# Patient Record
Sex: FEMALE | Race: WHITE | NOT HISPANIC OR LATINO | Employment: OTHER | URBAN - METROPOLITAN AREA
[De-identification: names, ages, dates, MRNs, and addresses within clinical notes are randomized per-mention and may not be internally consistent; named-entity substitution may affect disease eponyms.]

---

## 2019-03-08 ENCOUNTER — HOSPITAL ENCOUNTER (OUTPATIENT)
Dept: URGENT CARE | Facility: CLINIC | Age: 64
Discharge: HOME | End: 2019-03-08

## 2019-03-09 LAB — BACTERIA SPEC AEROBE CULT: NORMAL

## 2019-04-11 ENCOUNTER — HOSPITAL ENCOUNTER (OUTPATIENT)
Dept: URGENT CARE | Facility: CLINIC | Age: 64
Discharge: HOME | End: 2019-04-11
Attending: FAMILY MEDICINE

## 2019-04-11 LAB — GLUCOSE BLD-MCNC: 105 MG/DL (ref 65–99)

## 2019-05-02 ENCOUNTER — OFFICE VISIT CONVERTED (OUTPATIENT)
Dept: FAMILY MEDICINE CLINIC | Facility: CLINIC | Age: 64
End: 2019-05-02
Attending: NURSE PRACTITIONER

## 2019-05-09 ENCOUNTER — HOSPITAL ENCOUNTER (OUTPATIENT)
Dept: GENERAL RADIOLOGY | Facility: HOSPITAL | Age: 64
Discharge: HOME | End: 2019-05-09
Attending: NURSE PRACTITIONER

## 2019-05-09 LAB
CREAT BLD-MCNC: 0.7 MG/DL (ref 0.6–1.4)
GFR SERPLBLD BASED ON 1.73 SQ M-ARVRAT: >60 ML/MIN/{1.73_M2}

## 2019-07-29 ENCOUNTER — HOSPITAL ENCOUNTER (OUTPATIENT)
Dept: GENERAL RADIOLOGY | Facility: HOSPITAL | Age: 64
Discharge: HOME | End: 2019-07-29
Attending: NURSE PRACTITIONER

## 2019-08-02 ENCOUNTER — OFFICE VISIT CONVERTED (OUTPATIENT)
Dept: FAMILY MEDICINE CLINIC | Facility: CLINIC | Age: 64
End: 2019-08-02
Attending: NURSE PRACTITIONER

## 2019-08-02 ENCOUNTER — CONVERSION ENCOUNTER (OUTPATIENT)
Dept: FAMILY MEDICINE CLINIC | Facility: CLINIC | Age: 64
End: 2019-08-02

## 2019-11-08 ENCOUNTER — HOSPITAL ENCOUNTER (OUTPATIENT)
Dept: LAB | Facility: HOSPITAL | Age: 64
Discharge: HOME | End: 2019-11-08
Attending: NURSE PRACTITIONER

## 2019-11-08 LAB
25(OH)D3 SERPL-MCNC: 48.9 NG/ML (ref 30–100)
ALBUMIN SERPL-MCNC: 4.6 G/DL (ref 3.5–5)
ALBUMIN/GLOB SERPL: 1.7 {RATIO} (ref 1.4–2.6)
ALP SERPL-CCNC: 55 U/L (ref 43–160)
ALT SERPL-CCNC: 22 U/L (ref 10–40)
ANION GAP SERPL CALC-SCNC: 18 MMOL/L (ref 8–19)
AST SERPL-CCNC: 16 U/L (ref 15–50)
BASOPHILS # BLD AUTO: 0.05 10*3/UL (ref 0–0.2)
BASOPHILS NFR BLD AUTO: 0.6 % (ref 0–3)
BILIRUB SERPL-MCNC: 0.17 MG/DL (ref 0.2–1.3)
BUN SERPL-MCNC: 13 MG/DL (ref 5–25)
BUN/CREAT SERPL: 17 {RATIO} (ref 6–20)
CALCIUM SERPL-MCNC: 9.6 MG/DL (ref 8.7–10.4)
CHLORIDE SERPL-SCNC: 103 MMOL/L (ref 99–111)
CHOLEST SERPL-MCNC: 223 MG/DL (ref 107–200)
CHOLEST/HDLC SERPL: 3.6 {RATIO} (ref 3–6)
CONV ABS IMM GRAN: 0.02 10*3/UL (ref 0–0.2)
CONV CO2: 22 MMOL/L (ref 22–32)
CONV IMMATURE GRAN: 0.2 % (ref 0–1.8)
CONV TOTAL PROTEIN: 7.3 G/DL (ref 6.3–8.2)
CREAT UR-MCNC: 0.75 MG/DL (ref 0.5–0.9)
DEPRECATED RDW RBC AUTO: 45.3 FL (ref 36.4–46.3)
EOSINOPHIL # BLD AUTO: 0.39 10*3/UL (ref 0–0.7)
EOSINOPHIL # BLD AUTO: 4.8 % (ref 0–7)
ERYTHROCYTE [DISTWIDTH] IN BLOOD BY AUTOMATED COUNT: 13.2 % (ref 11.7–14.4)
EST. AVERAGE GLUCOSE BLD GHB EST-MCNC: 123 MG/DL
GFR SERPLBLD BASED ON 1.73 SQ M-ARVRAT: >60 ML/MIN/{1.73_M2}
GLOBULIN UR ELPH-MCNC: 2.7 G/DL (ref 2–3.5)
GLUCOSE SERPL-MCNC: 99 MG/DL (ref 65–99)
HBA1C MFR BLD: 5.9 % (ref 3.5–5.7)
HCT VFR BLD AUTO: 41.2 % (ref 37–47)
HDLC SERPL-MCNC: 62 MG/DL (ref 40–60)
HGB BLD-MCNC: 13.6 G/DL (ref 12–16)
LDLC SERPL CALC-MCNC: 135 MG/DL (ref 70–100)
LYMPHOCYTES # BLD AUTO: 3 10*3/UL (ref 1–5)
LYMPHOCYTES NFR BLD AUTO: 36.9 % (ref 20–45)
MCH RBC QN AUTO: 30.6 PG (ref 27–31)
MCHC RBC AUTO-ENTMCNC: 33 G/DL (ref 33–37)
MCV RBC AUTO: 92.6 FL (ref 81–99)
MONOCYTES # BLD AUTO: 0.6 10*3/UL (ref 0.2–1.2)
MONOCYTES NFR BLD AUTO: 7.4 % (ref 3–10)
NEUTROPHILS # BLD AUTO: 4.06 10*3/UL (ref 2–8)
NEUTROPHILS NFR BLD AUTO: 50.1 % (ref 30–85)
NRBC CBCN: 0 % (ref 0–0.7)
OSMOLALITY SERPL CALC.SUM OF ELEC: 288 MOSM/KG (ref 273–304)
PLATELET # BLD AUTO: 376 10*3/UL (ref 130–400)
PMV BLD AUTO: 10.6 FL (ref 9.4–12.3)
POTASSIUM SERPL-SCNC: 4.2 MMOL/L (ref 3.5–5.3)
RBC # BLD AUTO: 4.45 10*6/UL (ref 4.2–5.4)
SODIUM SERPL-SCNC: 139 MMOL/L (ref 135–147)
T4 FREE SERPL-MCNC: 1 NG/DL (ref 0.9–1.8)
TRIGL SERPL-MCNC: 129 MG/DL (ref 40–150)
TSH SERPL-ACNC: 2.19 M[IU]/L (ref 0.27–4.2)
VLDLC SERPL-MCNC: 26 MG/DL (ref 5–37)
WBC # BLD AUTO: 8.12 10*3/UL (ref 4.8–10.8)

## 2019-12-08 ENCOUNTER — HOSPITAL ENCOUNTER (OUTPATIENT)
Dept: URGENT CARE | Facility: CLINIC | Age: 64
Discharge: HOME | End: 2019-12-08
Attending: FAMILY MEDICINE

## 2020-02-10 ENCOUNTER — HOSPITAL ENCOUNTER (OUTPATIENT)
Dept: URGENT CARE | Facility: CLINIC | Age: 65
Discharge: HOME | End: 2020-02-10
Attending: FAMILY MEDICINE

## 2020-02-12 LAB — BACTERIA SPEC AEROBE CULT: NORMAL

## 2020-02-14 ENCOUNTER — HOSPITAL ENCOUNTER (OUTPATIENT)
Dept: ONCOLOGY | Facility: HOSPITAL | Age: 65
Discharge: HOME | End: 2020-02-14
Attending: INTERNAL MEDICINE

## 2020-02-14 ENCOUNTER — OFFICE VISIT CONVERTED (OUTPATIENT)
Dept: ONCOLOGY | Facility: HOSPITAL | Age: 65
End: 2020-02-14
Attending: INTERNAL MEDICINE

## 2020-02-20 ENCOUNTER — HOSPITAL ENCOUNTER (OUTPATIENT)
Dept: GENERAL RADIOLOGY | Facility: HOSPITAL | Age: 65
Discharge: HOME | End: 2020-02-20
Attending: INTERNAL MEDICINE

## 2020-02-20 LAB
CREAT BLD-MCNC: 0.6 MG/DL (ref 0.6–1.4)
GFR SERPLBLD BASED ON 1.73 SQ M-ARVRAT: >60 ML/MIN/{1.73_M2}

## 2020-03-05 ENCOUNTER — HOSPITAL ENCOUNTER (OUTPATIENT)
Dept: ONCOLOGY | Facility: HOSPITAL | Age: 65
Discharge: HOME | End: 2020-03-05
Attending: INTERNAL MEDICINE

## 2020-03-05 ENCOUNTER — OFFICE VISIT CONVERTED (OUTPATIENT)
Dept: ONCOLOGY | Facility: HOSPITAL | Age: 65
End: 2020-03-05
Attending: INTERNAL MEDICINE

## 2020-06-24 ENCOUNTER — HOSPITAL ENCOUNTER (OUTPATIENT)
Dept: FAMILY MEDICINE CLINIC | Facility: CLINIC | Age: 65
Discharge: HOME | End: 2020-06-24
Attending: NURSE PRACTITIONER

## 2020-06-24 LAB
APPEARANCE UR: CLEAR
BILIRUB UR QL: NEGATIVE
COLOR UR: YELLOW
CONV BACTERIA: NEGATIVE
CONV COLLECTION SOURCE (UA): ABNORMAL
CONV HYALINE CASTS IN URINE MICRO: ABNORMAL /[LPF]
CONV UROBILINOGEN IN URINE BY AUTOMATED TEST STRIP: 0.2 {EHRLICHU}/DL (ref 0.1–1)
GLUCOSE UR QL: NEGATIVE MG/DL
HGB UR QL STRIP: ABNORMAL
KETONES UR QL STRIP: NEGATIVE MG/DL
LEUKOCYTE ESTERASE UR QL STRIP: NEGATIVE
NITRITE UR QL STRIP: NEGATIVE
PH UR STRIP.AUTO: 7.5 [PH] (ref 5–8)
PROT UR QL: NEGATIVE MG/DL
RBC #/AREA URNS HPF: ABNORMAL /[HPF]
SP GR UR: 1.02 (ref 1–1.03)
SQUAMOUS SPT QL MICRO: ABNORMAL /[HPF]
WBC #/AREA URNS HPF: ABNORMAL /[HPF]

## 2020-06-25 ENCOUNTER — TELEMEDICINE CONVERTED (OUTPATIENT)
Dept: FAMILY MEDICINE CLINIC | Facility: CLINIC | Age: 65
End: 2020-06-25
Attending: NURSE PRACTITIONER

## 2020-06-26 ENCOUNTER — TELEMEDICINE CONVERTED (OUTPATIENT)
Dept: FAMILY MEDICINE CLINIC | Facility: CLINIC | Age: 65
End: 2020-06-26
Attending: NURSE PRACTITIONER

## 2020-06-26 LAB — BACTERIA UR CULT: NORMAL

## 2020-07-06 ENCOUNTER — CONVERSION ENCOUNTER (OUTPATIENT)
Dept: SURGERY | Facility: CLINIC | Age: 65
End: 2020-07-06

## 2020-07-06 ENCOUNTER — OFFICE VISIT CONVERTED (OUTPATIENT)
Dept: SURGERY | Facility: CLINIC | Age: 65
End: 2020-07-06
Attending: SURGERY

## 2020-07-06 ENCOUNTER — TELEMEDICINE CONVERTED (OUTPATIENT)
Dept: FAMILY MEDICINE CLINIC | Facility: CLINIC | Age: 65
End: 2020-07-06
Attending: NURSE PRACTITIONER

## 2020-07-15 ENCOUNTER — OFFICE VISIT CONVERTED (OUTPATIENT)
Dept: SURGERY | Facility: CLINIC | Age: 65
End: 2020-07-15
Attending: NURSE PRACTITIONER

## 2020-07-15 ENCOUNTER — CONVERSION ENCOUNTER (OUTPATIENT)
Dept: SURGERY | Facility: CLINIC | Age: 65
End: 2020-07-15

## 2020-07-15 ENCOUNTER — HOSPITAL ENCOUNTER (OUTPATIENT)
Dept: SURGERY | Facility: CLINIC | Age: 65
Discharge: HOME | End: 2020-07-15
Attending: NURSE PRACTITIONER

## 2020-07-15 LAB
APPEARANCE UR: ABNORMAL
BILIRUB UR QL: NEGATIVE
COLOR UR: ABNORMAL
CONV BACTERIA: ABNORMAL
CONV COLLECTION SOURCE (UA): ABNORMAL
CONV UROBILINOGEN IN URINE BY AUTOMATED TEST STRIP: 0.2 {EHRLICHU}/DL (ref 0.1–1)
GLUCOSE UR QL: NEGATIVE MG/DL
HGB UR QL STRIP: ABNORMAL
KETONES UR QL STRIP: NEGATIVE MG/DL
LEUKOCYTE ESTERASE UR QL STRIP: ABNORMAL
NITRITE UR QL STRIP: NEGATIVE
PH UR STRIP.AUTO: 5 [PH] (ref 5–8)
PROT UR QL: 100 MG/DL
RBC #/AREA URNS HPF: ABNORMAL /[HPF]
SP GR UR: 1.02 (ref 1–1.03)
SQUAMOUS SPT QL MICRO: ABNORMAL /[HPF]
WBC #/AREA URNS HPF: ABNORMAL /[HPF]

## 2020-07-17 LAB — BACTERIA UR CULT: NORMAL

## 2020-07-30 ENCOUNTER — CONVERSION ENCOUNTER (OUTPATIENT)
Dept: NEUROLOGY | Facility: CLINIC | Age: 65
End: 2020-07-30

## 2020-07-30 ENCOUNTER — OFFICE VISIT CONVERTED (OUTPATIENT)
Dept: NEUROLOGY | Facility: CLINIC | Age: 65
End: 2020-07-30
Attending: PSYCHIATRY & NEUROLOGY

## 2020-08-04 ENCOUNTER — TELEPHONE CONVERTED (OUTPATIENT)
Dept: UROLOGY | Facility: CLINIC | Age: 65
End: 2020-08-04
Attending: UROLOGY

## 2020-08-13 ENCOUNTER — HOSPITAL ENCOUNTER (OUTPATIENT)
Dept: PREADMISSION TESTING | Facility: HOSPITAL | Age: 65
Discharge: HOME | End: 2020-08-13
Attending: UROLOGY

## 2020-08-13 ENCOUNTER — OFFICE VISIT CONVERTED (OUTPATIENT)
Dept: NEUROLOGY | Facility: CLINIC | Age: 65
End: 2020-08-13
Attending: PSYCHIATRY & NEUROLOGY

## 2020-08-13 ENCOUNTER — CONVERSION ENCOUNTER (OUTPATIENT)
Dept: NEUROLOGY | Facility: CLINIC | Age: 65
End: 2020-08-13

## 2020-08-13 ENCOUNTER — HOSPITAL ENCOUNTER (OUTPATIENT)
Dept: LAB | Facility: HOSPITAL | Age: 65
Discharge: HOME | End: 2020-08-13
Attending: PSYCHIATRY & NEUROLOGY

## 2020-08-13 LAB — ERYTHROCYTE [SEDIMENTATION RATE] IN BLOOD: 6 MM/H (ref 0–30)

## 2020-08-14 LAB — SARS-COV-2 RNA SPEC QL NAA+PROBE: NOT DETECTED

## 2020-08-18 ENCOUNTER — HOSPITAL ENCOUNTER (OUTPATIENT)
Dept: PERIOP | Facility: HOSPITAL | Age: 65
Setting detail: HOSPITAL OUTPATIENT SURGERY
Discharge: HOME | End: 2020-08-18
Attending: UROLOGY

## 2020-08-26 ENCOUNTER — TELEPHONE CONVERTED (OUTPATIENT)
Dept: UROLOGY | Facility: CLINIC | Age: 65
End: 2020-08-26
Attending: UROLOGY

## 2020-08-28 ENCOUNTER — HOSPITAL ENCOUNTER (OUTPATIENT)
Dept: PREADMISSION TESTING | Facility: HOSPITAL | Age: 65
Discharge: HOME | End: 2020-08-28
Attending: UROLOGY

## 2020-08-28 LAB
ALBUMIN SERPL-MCNC: 4 G/DL (ref 3.5–5)
ALBUMIN/GLOB SERPL: 1.6 {RATIO} (ref 1.4–2.6)
ALP SERPL-CCNC: 47 U/L (ref 43–160)
ALT SERPL-CCNC: 16 U/L (ref 10–40)
ANION GAP SERPL CALC-SCNC: 14 MMOL/L (ref 8–19)
AST SERPL-CCNC: 11 U/L (ref 15–50)
BASOPHILS # BLD AUTO: 0.06 10*3/UL (ref 0–0.2)
BASOPHILS NFR BLD AUTO: 0.8 % (ref 0–3)
BILIRUB SERPL-MCNC: <0.15 MG/DL (ref 0.2–1.3)
BUN SERPL-MCNC: 16 MG/DL (ref 5–25)
BUN/CREAT SERPL: 21 {RATIO} (ref 6–20)
CALCIUM SERPL-MCNC: 10 MG/DL (ref 8.7–10.4)
CHLORIDE SERPL-SCNC: 106 MMOL/L (ref 99–111)
CONV ABS IMM GRAN: 0.01 10*3/UL (ref 0–0.2)
CONV CO2: 24 MMOL/L (ref 22–32)
CONV IMMATURE GRAN: 0.1 % (ref 0–1.8)
CONV TOTAL PROTEIN: 6.5 G/DL (ref 6.3–8.2)
CREAT UR-MCNC: 0.75 MG/DL (ref 0.5–0.9)
DEPRECATED RDW RBC AUTO: 44.2 FL (ref 36.4–46.3)
EOSINOPHIL # BLD AUTO: 0.31 10*3/UL (ref 0–0.7)
EOSINOPHIL # BLD AUTO: 3.9 % (ref 0–7)
ERYTHROCYTE [DISTWIDTH] IN BLOOD BY AUTOMATED COUNT: 12.9 % (ref 11.7–14.4)
GFR SERPLBLD BASED ON 1.73 SQ M-ARVRAT: >60 ML/MIN/{1.73_M2}
GLOBULIN UR ELPH-MCNC: 2.5 G/DL (ref 2–3.5)
GLUCOSE SERPL-MCNC: 100 MG/DL (ref 65–99)
HCT VFR BLD AUTO: 38.5 % (ref 37–47)
HGB BLD-MCNC: 12.4 G/DL (ref 12–16)
INR PPP: 0.93 (ref 2–3)
LYMPHOCYTES # BLD AUTO: 2.75 10*3/UL (ref 1–5)
LYMPHOCYTES NFR BLD AUTO: 34.7 % (ref 20–45)
MCH RBC QN AUTO: 30 PG (ref 27–31)
MCHC RBC AUTO-ENTMCNC: 32.2 G/DL (ref 33–37)
MCV RBC AUTO: 93 FL (ref 81–99)
MONOCYTES # BLD AUTO: 0.65 10*3/UL (ref 0.2–1.2)
MONOCYTES NFR BLD AUTO: 8.2 % (ref 3–10)
NEUTROPHILS # BLD AUTO: 4.15 10*3/UL (ref 2–8)
NEUTROPHILS NFR BLD AUTO: 52.3 % (ref 30–85)
NRBC CBCN: 0 % (ref 0–0.7)
OSMOLALITY SERPL CALC.SUM OF ELEC: 291 MOSM/KG (ref 273–304)
PLATELET # BLD AUTO: 354 10*3/UL (ref 130–400)
PMV BLD AUTO: 10.6 FL (ref 9.4–12.3)
POTASSIUM SERPL-SCNC: 3.6 MMOL/L (ref 3.5–5.3)
PROTHROMBIN TIME: 10.1 S (ref 9.4–12)
RBC # BLD AUTO: 4.14 10*6/UL (ref 4.2–5.4)
SODIUM SERPL-SCNC: 140 MMOL/L (ref 135–147)
WBC # BLD AUTO: 7.93 10*3/UL (ref 4.8–10.8)

## 2020-08-29 LAB — SARS-COV-2 RNA SPEC QL NAA+PROBE: NOT DETECTED

## 2020-09-09 ENCOUNTER — CONVERSION ENCOUNTER (OUTPATIENT)
Dept: SURGERY | Facility: CLINIC | Age: 65
End: 2020-09-09

## 2020-09-09 ENCOUNTER — HOSPITAL ENCOUNTER (OUTPATIENT)
Dept: GENERAL RADIOLOGY | Facility: HOSPITAL | Age: 65
Discharge: HOME | End: 2020-09-09
Attending: UROLOGY

## 2020-09-09 ENCOUNTER — OFFICE VISIT CONVERTED (OUTPATIENT)
Dept: UROLOGY | Facility: CLINIC | Age: 65
End: 2020-09-09
Attending: UROLOGY

## 2020-10-01 ENCOUNTER — TELEPHONE CONVERTED (OUTPATIENT)
Dept: UROLOGY | Facility: CLINIC | Age: 65
End: 2020-10-01
Attending: UROLOGY

## 2020-12-03 ENCOUNTER — PROCEDURE VISIT CONVERTED (OUTPATIENT)
Dept: UROLOGY | Facility: CLINIC | Age: 65
End: 2020-12-03
Attending: UROLOGY

## 2021-01-02 ENCOUNTER — HOSPITAL ENCOUNTER (OUTPATIENT)
Dept: URGENT CARE | Facility: CLINIC | Age: 66
Discharge: HOME | End: 2021-01-02
Attending: NURSE PRACTITIONER

## 2021-01-02 LAB — GLUCOSE BLD-MCNC: 109 MG/DL (ref 65–99)

## 2021-01-05 LAB
BACTERIA UR CULT: NORMAL
SARS-COV-2 RNA SPEC QL NAA+PROBE: DETECTED

## 2021-01-20 ENCOUNTER — OFFICE VISIT CONVERTED (OUTPATIENT)
Dept: FAMILY MEDICINE CLINIC | Facility: CLINIC | Age: 66
End: 2021-01-20
Attending: STUDENT IN AN ORGANIZED HEALTH CARE EDUCATION/TRAINING PROGRAM

## 2021-02-01 ENCOUNTER — OFFICE VISIT CONVERTED (OUTPATIENT)
Dept: SURGERY | Facility: CLINIC | Age: 66
End: 2021-02-01
Attending: NURSE PRACTITIONER

## 2021-02-01 ENCOUNTER — CONVERSION ENCOUNTER (OUTPATIENT)
Dept: SURGERY | Facility: CLINIC | Age: 66
End: 2021-02-01

## 2021-02-12 ENCOUNTER — HOSPITAL ENCOUNTER (OUTPATIENT)
Dept: LAB | Facility: HOSPITAL | Age: 66
Discharge: HOME | End: 2021-02-12
Attending: STUDENT IN AN ORGANIZED HEALTH CARE EDUCATION/TRAINING PROGRAM

## 2021-02-12 ENCOUNTER — HOSPITAL ENCOUNTER (OUTPATIENT)
Dept: GENERAL RADIOLOGY | Facility: HOSPITAL | Age: 66
Discharge: HOME | End: 2021-02-12
Attending: STUDENT IN AN ORGANIZED HEALTH CARE EDUCATION/TRAINING PROGRAM

## 2021-02-12 LAB
ALBUMIN SERPL-MCNC: 4.2 G/DL (ref 3.5–5)
ALBUMIN/GLOB SERPL: 1.5 {RATIO} (ref 1.4–2.6)
ALP SERPL-CCNC: 52 U/L (ref 43–160)
ALT SERPL-CCNC: 17 U/L (ref 10–40)
ANION GAP SERPL CALC-SCNC: 13 MMOL/L (ref 8–19)
AST SERPL-CCNC: 14 U/L (ref 15–50)
BASOPHILS # BLD AUTO: 0.03 10*3/UL (ref 0–0.2)
BASOPHILS NFR BLD AUTO: 0.4 % (ref 0–3)
BILIRUB SERPL-MCNC: 0.3 MG/DL (ref 0.2–1.3)
BUN SERPL-MCNC: 14 MG/DL (ref 5–25)
BUN/CREAT SERPL: 14 {RATIO} (ref 6–20)
CALCIUM SERPL-MCNC: 9.3 MG/DL (ref 8.7–10.4)
CHLORIDE SERPL-SCNC: 105 MMOL/L (ref 99–111)
CHOLEST SERPL-MCNC: 221 MG/DL (ref 107–200)
CHOLEST/HDLC SERPL: 3.6 {RATIO} (ref 3–6)
CONV ABS IMM GRAN: 0.01 10*3/UL (ref 0–0.2)
CONV CO2: 26 MMOL/L (ref 22–32)
CONV IMMATURE GRAN: 0.1 % (ref 0–1.8)
CONV TOTAL PROTEIN: 7 G/DL (ref 6.3–8.2)
CREAT UR-MCNC: 1.02 MG/DL (ref 0.5–0.9)
DEPRECATED RDW RBC AUTO: 45.7 FL (ref 36.4–46.3)
EOSINOPHIL # BLD AUTO: 0.3 10*3/UL (ref 0–0.7)
EOSINOPHIL # BLD AUTO: 3.9 % (ref 0–7)
ERYTHROCYTE [DISTWIDTH] IN BLOOD BY AUTOMATED COUNT: 13.5 % (ref 11.7–14.4)
EST. AVERAGE GLUCOSE BLD GHB EST-MCNC: 120 MG/DL
GFR SERPLBLD BASED ON 1.73 SQ M-ARVRAT: 58 ML/MIN/{1.73_M2}
GLOBULIN UR ELPH-MCNC: 2.8 G/DL (ref 2–3.5)
GLUCOSE SERPL-MCNC: 119 MG/DL (ref 65–99)
HBA1C MFR BLD: 5.8 % (ref 3.5–5.7)
HCT VFR BLD AUTO: 42.4 % (ref 37–47)
HDLC SERPL-MCNC: 62 MG/DL (ref 40–60)
HGB BLD-MCNC: 13.5 G/DL (ref 12–16)
LDLC SERPL CALC-MCNC: 138 MG/DL (ref 70–100)
LYMPHOCYTES # BLD AUTO: 2.88 10*3/UL (ref 1–5)
LYMPHOCYTES NFR BLD AUTO: 37.8 % (ref 20–45)
MCH RBC QN AUTO: 29 PG (ref 27–31)
MCHC RBC AUTO-ENTMCNC: 31.8 G/DL (ref 33–37)
MCV RBC AUTO: 91 FL (ref 81–99)
MONOCYTES # BLD AUTO: 0.64 10*3/UL (ref 0.2–1.2)
MONOCYTES NFR BLD AUTO: 8.4 % (ref 3–10)
NEUTROPHILS # BLD AUTO: 3.75 10*3/UL (ref 2–8)
NEUTROPHILS NFR BLD AUTO: 49.4 % (ref 30–85)
NRBC CBCN: 0 % (ref 0–0.7)
OSMOLALITY SERPL CALC.SUM OF ELEC: 292 MOSM/KG (ref 273–304)
PLATELET # BLD AUTO: 353 10*3/UL (ref 130–400)
PMV BLD AUTO: 10.4 FL (ref 9.4–12.3)
POTASSIUM SERPL-SCNC: 4.2 MMOL/L (ref 3.5–5.3)
RBC # BLD AUTO: 4.66 10*6/UL (ref 4.2–5.4)
SODIUM SERPL-SCNC: 140 MMOL/L (ref 135–147)
TRIGL SERPL-MCNC: 105 MG/DL (ref 40–150)
TSH SERPL-ACNC: 2.04 M[IU]/L (ref 0.27–4.2)
VLDLC SERPL-MCNC: 21 MG/DL (ref 5–37)
WBC # BLD AUTO: 7.61 10*3/UL (ref 4.8–10.8)

## 2021-03-22 ENCOUNTER — CONVERSION ENCOUNTER (OUTPATIENT)
Dept: SURGERY | Facility: CLINIC | Age: 66
End: 2021-03-22

## 2021-03-22 ENCOUNTER — OFFICE VISIT CONVERTED (OUTPATIENT)
Dept: UROLOGY | Facility: CLINIC | Age: 66
End: 2021-03-22
Attending: UROLOGY

## 2021-03-22 LAB
BILIRUB UR QL STRIP: NEGATIVE
COLOR UR: YELLOW
CONV BACTERIA IN URINE MICRO: 0
CONV CALCIUM OXALATE CRYSTALS /HPF IN URINE SEDIMENT BY MICROSCOPY: 0
CONV CLARITY OF URINE: CLEAR
CONV PROTEIN IN URINE BY AUTOMATED TEST STRIP: NEGATIVE
CONV UROBILINOGEN IN URINE BY AUTOMATED TEST STRIP: 0.2
GLUCOSE UR QL: NEGATIVE
HGB UR QL STRIP: NEGATIVE
KETONES UR QL STRIP: NEGATIVE
LEUKOCYTE ESTERASE UR QL STRIP: NEGATIVE
NITRITE UR QL STRIP: NEGATIVE
PH UR STRIP.AUTO: 7 [PH]
RBC #/AREA URNS HPF: 0 /[HPF]
RENAL EPI CELLS #/AREA URNS HPF: 0 /[HPF]
SP GR UR: 1.02
SQUAMOUS SPT QL MICRO: 0
WBC #/AREA URNS HPF: 0 /[HPF]

## 2021-03-26 ENCOUNTER — HOSPITAL ENCOUNTER (OUTPATIENT)
Dept: PREADMISSION TESTING | Facility: HOSPITAL | Age: 66
Discharge: HOME | End: 2021-03-26
Attending: SURGERY

## 2021-03-27 LAB — SARS-COV-2 RNA SPEC QL NAA+PROBE: NOT DETECTED

## 2021-03-31 ENCOUNTER — HOSPITAL ENCOUNTER (OUTPATIENT)
Dept: GASTROENTEROLOGY | Facility: HOSPITAL | Age: 66
Setting detail: HOSPITAL OUTPATIENT SURGERY
Discharge: HOME | End: 2021-03-31
Attending: SURGERY

## 2021-04-05 ENCOUNTER — HOSPITAL ENCOUNTER (OUTPATIENT)
Dept: PREADMISSION TESTING | Facility: HOSPITAL | Age: 66
Discharge: HOME | End: 2021-04-05
Attending: UROLOGY

## 2021-04-05 LAB — SARS-COV-2 RNA SPEC QL NAA+PROBE: NOT DETECTED

## 2021-04-06 ENCOUNTER — HOSPITAL ENCOUNTER (OUTPATIENT)
Dept: PERIOP | Facility: HOSPITAL | Age: 66
Setting detail: HOSPITAL OUTPATIENT SURGERY
Discharge: HOME | End: 2021-04-06
Attending: UROLOGY

## 2021-04-14 ENCOUNTER — OFFICE VISIT CONVERTED (OUTPATIENT)
Dept: SURGERY | Facility: CLINIC | Age: 66
End: 2021-04-14
Attending: NURSE PRACTITIONER

## 2021-04-14 ENCOUNTER — OFFICE VISIT CONVERTED (OUTPATIENT)
Dept: UROLOGY | Facility: CLINIC | Age: 66
End: 2021-04-14
Attending: UROLOGY

## 2021-04-14 LAB
BILIRUB UR QL STRIP: NEGATIVE
COLOR UR: YELLOW
CONV BACTERIA IN URINE MICRO: 0
CONV CALCIUM OXALATE CRYSTALS /HPF IN URINE SEDIMENT BY MICROSCOPY: 0
CONV CLARITY OF URINE: CLEAR
CONV PROTEIN IN URINE BY AUTOMATED TEST STRIP: NEGATIVE
CONV UROBILINOGEN IN URINE BY AUTOMATED TEST STRIP: 0.2
GLUCOSE UR QL: NEGATIVE
HGB UR QL STRIP: NEGATIVE
KETONES UR QL STRIP: NEGATIVE
LEUKOCYTE ESTERASE UR QL STRIP: NEGATIVE
NITRITE UR QL STRIP: NEGATIVE
PH UR STRIP.AUTO: 5 [PH]
RBC #/AREA URNS HPF: 0 /[HPF]
RENAL EPI CELLS #/AREA URNS HPF: 0 /[HPF]
SP GR UR: 1.02
SQUAMOUS SPT QL MICRO: 0
WBC #/AREA URNS HPF: 0 /[HPF]

## 2021-05-10 NOTE — H&P
History and Physical      Patient Name: Neda Núñez   Patient ID: [id number]   Sex: Female   YOB: 1955    Primary Care Provider: Yolanda COREA   Referring Provider: Yolanda COREA    Visit Date: July 30, 2020    Provider: Rustam Wheat MD   Location: [facility]   Location Address: [address] [place] [address]  [id number]   Location Phone: [phone]          Chief Complaint     New pt here for ocular h/a's.       History Of Present Illness  Neda Núñez is a 65 year old /White female who presents today to [facility] today referred from Yolanda COREA.      65-year-old woman evaluated for new onset headache.  She states that 4 weeks ago she started having headaches that were on her right eye that went all the way to her back of her head.  The pain in the right eyeball was the worst part of her pain.  She states that it was a pressure type of pain and then when she would move he would be pounding and throbbing.  There is associated light sensitivity.  There is no no sensitivity.  She states that it would last for 2 hours and she would lie down in the bed with an ice pack in the back of her head.  She also placed a refrigerated pad in her eyes.  She states that there was nausea with this.  When she had a severe headache there was flashes of light in her left eyeball.  It would be multicolored lights that were swirling.  It would last as long as he has the headache for several hours.  It would happen at least twice a week that she would have a headache and this visual auras.  She does not have any jaw claudication.  She has no weight loss or fevers.  She is never had a history of migraine headaches when she was younger.  She has no history of severe headaches associated light no sensitivity, incapacitating when she was in her teens, 20s, 30s, 40s or 50s.    She saw an ophthalmologist and was told there is nothing wrong with her  eyes.    She states that she has been stressed out taking care of her mother.  Her mother is 93 years old.  She also has dementia.  Her sister has dementia.  She wants to be tested for dementia.  She is independent with all activities of daily living.       Past Medical History  Allergies; Lung cancer; Reflux         Past Surgical History  Back Surgery, Lumbar; Leg Surgery; Lung Surgery         Allergy List  PENICILLINS; SULFA (SULFONAMIDES)         Family Medical History  Cancer, Unspecified; Migraine Headaches; Lung cancer; *No Known Family History         Social History  Alcohol (Light); Tobacco (Former)         Immunizations  Name Date Admin   Influenza          Review of Systems  · Constitutional  o Denies  o : chills, excessive sweating, fatigue, fever, sycope/passing out, weight gain, weight loss  · Eyes  o Admits  o : blurry vision  o Denies  o : changes in vision, double vision  · HENT  o Admits  o : seasonal allergies  o Denies  o : loss of hearing, ringing in the ears, ear aches, sore throat, nasal congestion, sinus pain, nose bleeds  · Cardiovascular  o Denies  o : blood clots, swollen legs, anemia, easy burising or bleeding, transfusions  · Respiratory  o Admits  o : shortness of breath  o Denies  o : dry cough, productive cough, pneumonia, COPD  · Gastrointestinal  o Denies  o : difficulty swallowing, reflux  · Genitourinary  o Denies  o : incontinence  · Neurologic  o Admits  o : headache, numbness/tingling/paresthesia   o Denies  o : seizure, stroke, tremor, loss of balance, falls, dizziness/vertigo, difficulty with sleep, difficulty with coordination, difficulty with dexterity, weakness  · Musculoskeletal  o Admits  o : neck stiffness/pain, spasms, pain radiating in leg, low back pain  o Denies  o : swollen lymph nodes, muscle aches, joint pain, weakness, sciatica, pain radiating in arm  · Endocrine  o Denies  o : diabetes, thyroid disorder  · Psychiatric  o Denies  o : anxiety,  "depression      Vitals  Date Time BP Position Site L\R Cuff Size HR RR TEMP (F) WT  HT  BMI kg/m2 BSA m2 O2 Sat HC       07/30/2020 01:55 PM        97.3         07/30/2020 02:10 /73 Sitting    72 - R   139lbs 0oz 5'  2\" 25.42 1.66           Physical Examination  · Constitutional  o Appearance  o : well-nourished, well groomed, in no apparent distress  · Eyes  o Pupils and Irises  o : pupils equal, round, and reactive to light and accommodation bilaterally  · Respiratory  o Auscultation of Lungs  o : Lungs were clear to ascultation bilaterally.   · Cardiovascular  o Heart  o :   § Auscultation of Heart  § : regular rhythem and normal rate.  o Peripheral Vascular System  o :   § Carotid Arteries  § : carotids are clear bilaterally  § Extremities  § : no peripheral edema was appreciated  · Musculoskeletal  o General  o : normal bulk and normal tone throughout. 5/5 motor strength throughout and symmetric.   · Neurologic  o Mental Status Examination  o :   § Orientation  § : Alert and oriented to person, place, and time.  § Speech/Language  § : Intact naming, comprehension, and repetition. No dysarthria.  § Memory  § : Intact  § Attention  § : Intact  § Fund of Knowledge  § : Adequate fund of knowledge.  § Mental Status Examination  § : Mental Status Score: 28 out of 30. She missed 2 out of 3 memory testing initially and then on repeat testing she got 3 out of 3. She is able to give me general knowledge questions without any difficulty. She is able to name the president a previous president. She is able to give the history as noted above. Her clock drawing is intact.  o Cranial Nerves  o : Pupils are equal, round and reactive to light. Extraocular movements are intact. Visual fields are full. Fundoscopic examination reveals sharp disc bilaterally. Sensation in the V1-V3 distribution is intact and symmetric. Muscles of mastication are strong and symmetric. Muscles of facial expression are strong and symmetric. " Hearing is intact. Palatal raise is intact and symmetric. Uvula is midline. Shoulder shrug is strong. Tongue protrudes in the midline.  o Motor Examination  o : Normal bulk. 5/5 motor strength throughout and symmetric.   o Reflexes  o : . normal in the biceps, triceps, patellar's and decrease in ankle  o Sensation  o : Intact sensation to light touch, symmetrical  o Gait and Station  o : Normal gait, arm swing and turning. Able to tiptoe, heel walk and mikaela and tandem without difficulty.  o Cerebellar Function  o : Intact finger to nose, rapid alternating movements and fine finger movements.          Assessment  · Memory loss     780.93/R41.3  I told her that she does not have a diagnosis of Alzheimer's disease. Memory problems is most likely brought on by stressors in her life at this time.  · New onset headache     784.0/R51  I told her that we will look for more unusual causes of headache on a 65-year-old then make a diagnosis of from migraine with aura. I told her that that diagnosis cannot be made on her at this time. I will order an MRI of the brain with and without contrast, MRA of the brain and carotids, sedimentation rate. If this is unremarkable I will schedule her to have a lumbar puncture on her next clinic visit. I will see her again in 2 weeks time for follow-up.    45 minutes was spent for this moderate complexity visit more than half the time was spent face-to-face the patient for examination, counseling, planning and recommendations.  · Visual aura     368.9/H53.9    Problems Reconciled  Plan  · Orders  o Sed rate (57111) - 784.0/R51, 368.9/H53.9 - 07/30/2020  o MRA head w/o contrast (04886) - 784.0/R51, 368.9/H53.9 - 07/30/2020  o MRA neck w/o contrast (45367) - 784.0/R51, 368.9/H53.9 - 07/30/2020  o MRI brain wo then w contrast (91977) - 784.0/R51, 368.9/H53.9 - 07/30/2020  · Medications  o Medications have been Reconciled  o Transition of Care or Provider Policy  · Instructions  o Encouraged to  follow-up with Primary Care Provider for preventative care.            Electronically Signed by: Rustam Wheat MD -Author on July 30, 2020 02:52:14 PM

## 2021-05-10 NOTE — PROCEDURES
Procedure Note      Patient Name: Neda Núñez   Patient ID: [id number]   Sex: Female   YOB: 1955    Primary Care Provider: Yolanda COREA   Referring Provider: Yolanda COREA    Visit Date: December 3, 2020    Provider: Kadie Vu MD   Location: [facility]   Location Address: [address] [place] [address]  [id number]   Location Phone: [phone]          The patient presents for follow-up of superficial bladder cancer.   The patient was last seen three months ago . The patient is scheduled for repeat cysto and u/a .   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 She had a left nephroureterectomy for T3 disease. She has not had any bladder tumors. The most recent tumor pathology has not been done.   Imaging:  Prior imaging studies have been done. These were done at diagnosis and include a CT scan of the abdomen and pelvis and significant findings were a left renal filling defect.   Cystoscopy Procedure:  PROCEDURE: Flexible cystoscope was passed per urethra into the bladder without difficulty after proper consent. The bladder was inspected in a systematic meridian fashion. There were no tumors, lesions, stones, or other abnormalities noted within the bladder. Of note, there was no increased vascularity as well. Both ureteral orifices were identified and were normal in appearance. The flexible cystoscope was removed. The patient tolerated the procedure well.           Assessment  · Left Urothelial cancer     189.8/C68.9      Plan  · Orders  o Cystoscopy (04950) - 189.8/C68.9 - 12/03/2020  · Medications  o Medications have been Reconciled  o Transition of Care or Provider Policy  · Instructions  o Make a 3 month follow-up for an cystoscopy and right retrograde pyelogram for continued screening.  o TIME OUT PROCEDURE: Correct patient and birth date; Correct  procedure; Correct Physician; Consent signed            Electronically Signed by: Kadie Vu MD -Author on December 3, 2020 03:32:37 PM

## 2021-05-10 NOTE — H&P
History and Physical      Patient Name: Neda Núñez   Patient ID: [id number]   Sex: Female   YOB: 1955    Primary Care Provider: Yolanda COREA   Referring Provider: Yolanda COREA    Visit Date: July 6, 2020    Provider: Raymundo Castano MD   Location: [facility]   Location Address: [address] [place] [address]  [id number]   Location Phone: [phone]          History Of Present Illness  Neda Núñez is a 65 year old /White female who presents to the office today as a consult from Yolanda COREA.      She was referred for a possible subcutaneous lesion at her right shoulder area.  Patient says the area at her right medial clavicle has been getting larger in size.  No pain at the area.  Patient has a history of lung cancer and is concerned the change she has noticed might have something to do with lung cancer.  He had a CT scan of her head and neck in February 2020 and a skin marker was placed at the area where the patient is concerned and the radiology report indicates there is no abnormality at that area.  I reviewed the CT scan images myself and there is no abnormality at the area where the patient has a concern.  The patient says the area of concern looked about the same in February as it does now.  Patient has also been having some headaches and blurry vision recently and wants to know whether the area she is concerned about at her right medial clavicle has anything to do with this.       Past Medical History  Disease Name Date Onset Notes   Allergies --  --    Lung cancer --  --    Reflux --  --          Past Surgical History  Procedure Name Date Notes   Back Surgery, Lumbar --  --    Leg Surgery --  --    Lung Surgery --  --          Allergy List  Allergen Name Date Reaction Notes   PENICILLINS --  --  --    SULFA (SULFONAMIDES) --  --  --        Allergies Reconciled  Family Medical History  Disease Name Relative/Age Notes   *No Known Family  "History  --          Social History  Finding Status Start/Stop Quantity Notes   Tobacco Former --/-- --  --          Immunizations  NameDate Admin Mfg Trade Name Lot Number Route Inj VIS Given VIS Publication   [id number]/08/2019 [facility] [employer] [id number] IM RA 11/08/2019    Comments:          Review of Systems  · Constitutional  o Denies  o : chills, fever  · Gastrointestinal  o Denies  o : nausea, vomiting      Vitals  Date Time BP Position Site L\R Cuff Size HR RR TEMP (F) WT  HT  BMI kg/m2 BSA m2 O2 Sat HC       07/06/2020 09:00 AM       14  134lbs 0oz 5'  2\" 24.51 1.63           Physical Examination  · Constitutional  o Appearance  o : healthy appearing, alert and in no acute distress, reliable historian, here alone  · Head and Face  o Head  o :   § Inspection  § : no visable deformities or lesions  · Eyes  o Conjunctivae  o : clear  o Sclerae  o : clear  · Neck  o Inspection/Palpation  o : normal appearance, no masses, trachea midline  · Respiratory  o Respiratory Effort  o : breathing unlabored, respiratory effort appears normal  o Inspection of Chest  o : normal appearance, no retractions  · Cardiovascular  o Heart  o : regular rate and rhythm  · Skin and Subcutaneous Tissue  o General Inspection  o : right medial clavicle is more prominent than the left medial clavicle but there is no soft tissue or bony abnormality detectable and the skin overlying the area is normal appearing. no palpable neck masses. small scar in the midline at the sternal notch  · Neurologic  o Cranial Nerves  o : no obvious motor deficits  o Sensation  o : no obvious sensory deficits  o Gait and Station  o :   § Gait Screening  § : normal gait, able to stand without diffculty  o Cerebellar Function  o : no obvious abnormalities  · Psychiatric  o Judgment and Insight  o : judgment and insight intact  o Mood and Affect  o : mood normal, affect appropriate          Assessment  · Headache     784.0/R51    Problems " Reconciled  Plan  · Medications  o Medications have been Reconciled  o Transition of Care or Provider Policy  · Instructions  o Electronically Identified Patient Education Materials Provided Electronically     There is no detectable physical exam abnormality at the patient's area of concern at her right medial clavicle.  Additionally, there is no detectable abnormality at the area of concern on a CT scan done in February 2020.  There appears to be just asymmetry of her medial clavicles with no clinical relevance.  I assured the patient that her concerned about her right medial clavicle does not have anything to do with her headaches.  Patient will see me again if the area she is concerned about increases in size.             Electronically Signed by: Raymundo Castano MD -Author on July 6, 2020 09:27:44 AM

## 2021-05-10 NOTE — H&P
History and Physical      Patient Name: Neda Núñez   Patient ID: [id number]   Sex: Female   YOB: 1955    Primary Care Provider: Jeff Art MD   Referring Provider: Jeff Art MD    Visit Date: February 1, 2021    Provider: NAHOMY Zheng   Location: [facility]   Location Address: [address] [place] [address]  [id number]   Location Phone: [phone]          Chief Complaint  · Age 50 or over  · Here today for a pre-surgical colon screening visit  · GERD  · Requesting EGD and Colonoscopy      History Of Present Illness  The patient is a 65 year old /White female presenting to the [facility] on a referral from Jeff Art MD.   Neda Núñez needs to have a diagnostic colonoscopy and EGD.   Patient states that they have not had a colonoscopy.   Patient currently complains of: GERD   Patient Does not have family history of colon cancer.      Patient presents today on referral from Dr. Jeff Art for GERD and a screening colonoscopy.  Patient has a history of renal cell carcinoma and had a nephrectomy performed.  She reports that she has been with some acid reflux symptoms on and off for the last 3 months.  She denies any lower abdominal pain, diarrhea, or rectal bleeding.  Denies any family history of colorectal cancer.  No previous colonoscopy.       Past Medical History  Disease Name Date Onset Notes   Allergies --  --    Lung cancer --  --    Reflux --  --    Ureteral mass --  --    Urothelial cancer 12/03/2020 --          Past Surgical History  Procedure Name Date Notes   Back Surgery, Lumbar --  --    Cystoscopy and ureteroscopy with stent placement --  --    Cystoscopy, with ureteral biopsy --  --    Leg Surgery --  --    Lung Surgery --  --          Allergy List  Allergen Name Date Reaction Notes   PENICILLINS --  --  --    SULFA (SULFONAMIDES) --  --  --        Allergies Reconciled  Family Medical History  Disease Name Relative/Age  "Notes   Cancer, Unspecified  --    Migraine Headaches  --    Lung cancer  --          Social History  Finding Status Start/Stop Quantity Notes   Alcohol Light --/-- --  07/30/2020 - Rarely    Tobacco Former 17/53 40/day 07/30/2020 -          Review of Systems  · Constitutional  o Denies  o : fever, chills  · Eyes  o Denies  o : yellowish discoloration of eyes  · HENT  o Denies  o : difficulty swallowing  · Cardiovascular  o Denies  o : chest pain, chest pain on exertion  · Respiratory  o Denies  o : shortness of breath  · Gastrointestinal  o Admits  o : heartburn, reflux  o Denies  o : nausea, vomiting, diarrhea, constipation  · Genitourinary  o Denies  o : abnormal color of urine  · Integument  o Denies  o : rash  · Neurologic  o Denies  o : tingling or numbness  · Musculoskeletal  o Denies  o : joint pain  · Endocrine  o Denies  o : weight gain, weight loss      Vitals  Date Time BP Position Site L\R Cuff Size HR RR TEMP (F) WT  HT  BMI kg/m2 BSA m2 O2 Sat FR L/min FiO2        02/01/2021 02:13 PM       16  140lbs 0oz 5'  2\" 25.61 1.67             Physical Examination  · Constitutional  o Appearance  o : well developed, well-nourished, patient in no apparent distress  · Head and Face  o Head  o :   § Inspection  § : atraumatic, normocephalic  o Face  o :   § Inspection  § : no facial lesions  · Eyes  o Conjunctivae  o : conjunctivae normal  o Sclerae  o : sclerae white  · Neck  o Inspection/Palpation  o : normal appearance, no masses or tenderness, trachea midline  · Respiratory  o Respiratory Effort  o : breathing unlabored  · Skin and Subcutaneous Tissue  o General Inspection  o : no lesions present, no areas of discoloration, skin turgor normal, texture normal  · Neurologic  o Mental Status Examination  o :   § Orientation  § : grossly oriented to person, place and time  § Attention  § : attention normal, concentration abilities normal  § Fund of Knowledge  § : fund of knowledge within normal limits, patient " aware of current events  o Gait and Station  o : normal gait, able to stand without difficulty  · Psychiatric  o Judgement and Insight  o : judgment and insight intact  o Mood and Affect  o : mood normal, affect appropriate              Assessment  · GERD (gastroesophageal reflux disease)     530.81/K21.9  · Screening for colon cancer     V76.51/Z12.11  · Pre-op testing     V72.84/Z01.818    Problems Reconciled  Plan  · Orders  o Consent for Colonoscopy Screening-Possible risk/complications, benefits, and alternatives to surgical or invasive procedure have been explained to patient and/or legal guardian. -Patient has been evaluated and can tolerate anesthesia and/or sedation. Risks, benefits, and alternatives to anesthesia and sedation have been explained to patient and/or legal guardian. () - 530.81/K21.9, V76.51/Z12.11, V72.84/Z01.818 - 02/24/2021  o Consent for Esophagogastroduodenoscopy (EGD) with dilation - Possible risks/complications, benefits, and alternatives to surgical or invasive procedure have been explained to patient and/or legal guardian. - Patient has been evaluated and can tolerate anesthesia and/or sedation. Risks, benefits, and alternatives to anesthesia and sedation have been explained to patient and/or legal guardian. (75866) - 530.81/K21.9, V76.51/Z12.11, V72.84/Z01.818 - 02/24/2021  o [facility] Pre-Op Covid-19 Screening (82071) - V72.84/Z01.818 - 02/19/2021   [address]   · Medications  o Medications have been Reconciled  o Transition of Care or Provider Policy  · Instructions  o Surgical Facility: [facility]  o Handouts Provided Pre-Procedure Instructions including date, time, and location of procedure.   o PLAN: Proceeed with colonoscopy. Patient understands risks/benefits and is willing to proceed.   o PLAN: Proceeed with EGD. Patient understands risks/benefits and is willing to proceed.   o ***Surgical Orders***  o RISK AND BENEFITS:  o Given these options, the  patient has verbally expressed an understanding of the risks of the surgery and finds these risks acceptable. Will proceed with surgery as soon as possible.  o O.R. PREP: Per protocol   o IV: Per Anesthesia  o Please sign permit for: Colonoscopy with possible biopsies by Dr. Castano.  o Please sign permit for: Esophagogastroduodenoscopy with possible biopsies and dilation by Dr. Castano.  o The above History and Physical Examination has been completed within 30 days of admission.  o ***Patient Status***  o Outpatient  o Follow up in the in the office post procedure.  o Electronically Identified Patient Education Materials Provided Electronically            Electronically Signed by: NAHOMY Zheng -Author on February 1, 2021 02:47:17 PM

## 2021-05-10 NOTE — H&P
History and Physical      Patient Name: Neda Núñez   Patient ID: [id number]   Sex: Female   YOB: 1955    Primary Care Provider: Yolanda COREA   Referring Provider: Virginia Cristina NP    Visit Date: July 15, 2020    Provider: NAHOMY Zheng   [facility]   Location Address: [address] [place] [address]  [id number]   Location Phone: [phone]          Chief Complaint  · Microscopic hematuria  · Gross hematuria  · frequency  · urgency      History Of Present Illness  She was found to have gross hematura at [facility]loanda COREA office on 2020. This has been present for 1 month. She has not had a workup for hematuria in the past. She denies any UTI's within the past year. She admits to frequency & urgency. Denies any dysuria. She does not have a history of kidney stones. She admits to left sided abdomen and flank pain. Denies any fever or chills. Was treated with antibiotics, despite urine culture being negative. Denies any recent falls. Does not take any blood thinners.      Patient reports that she has seen her urine go from pink color to brown with 3 clots over the last month.    Patient has a personal history of lung cancer and had chemo for treatment.    Previous urinalysis:  2020:  color: yellow  clarity: clear  pH: 7.5  S.016  Pro: negative  Glu: negative  Ket: negative  Occ: large  Nit: negative  Bili: negative  Uro: 0.2  Leuko: negative  Urine culture - No uropathogens identified.       Past Medical History  Disease Name Date Onset Notes   Allergies --  --    Lung cancer --  --    Reflux --  --          Past Surgical History  Procedure Name Date Notes   Back Surgery, Lumbar --  --    Leg Surgery --  --    Lung Surgery --  --          Allergy List  Allergen Name Date Reaction Notes   PENICILLINS --  --  --    SULFA (SULFONAMIDES) --  --  --          Family Medical History  Disease Name Relative/Age Notes   Cancer, Unspecified  --    Migraine  "Headaches  --    Lung cancer  --    *No Known Family History  --          Social History  Finding Status Start/Stop Quantity Notes   Alcohol Light --/-- --  07/30/2020 - Rarely    Tobacco Former --/-- --  07/30/2020 -          Review of Systems  · Constitutional  o Denies  o : fever, chills  · Cardiovascular  o Denies  o : reviewed and unchanged  · Genitourinary  o Admits  o : urgency, frequency, hematuria, change in urine color  o Denies  o : dysuria, nocturia, oliguria, incontinence, urinary retention, difficulty voiding, urinary hesitancy, decreased stream, vaginal discharge  · Endocrine  o Denies  o : weight loss, reviewed and unchanged      Vitals  Date Time BP Position Site L\R Cuff Size HR RR TEMP (F) WT  HT  BMI kg/m2 BSA m2 O2 Sat HC       07/15/2020 03:12 PM       16  137lbs 0oz 5'  2\" 25.06 1.65           Physical Examination  · Constitutional  o Appearance  o : Well nourished, well groomed. Atraumatic.           Results  · In-Office Procedures  o Lab procedure  § Automated Dipstick Urinalysis (Surg Spec) WITHOUT Micro [facility] (59537)   § Color Ur: Brown   § Clarity Ur: Cloudy   § Glucose Ur Ql Strip: Negative   § Bilirub Ur Ql Strip: Negative   § Ketones Ur Ql Strip: Negative   § Sp Gr Ur Qn: 1.025   § Hgb Ur Ql Strip: Large   § pH Ur-LsCnc: 5.5   § Prot Ur Ql Strip: 100 mg/dL   § Urobilinogen Ur Strip-mCnc: 0.2 E.U./dL   § Nitrite Ur Ql Strip: Negative   § WBC Est Ur Ql Strip: Negative       Assessment  · Gross hematuria     599.71/R31.0  · Abdominal Pain     789.00/R10.9  · Flank pain     789.09/R10.9      Plan  · Orders  o IOP - Urinalysis (Clinitek) with Microscopy (01661) - 599.71/R31.0, 789.00/R10.9, 789.09/R10.9 - 07/15/2020  o Urine Culture (Clean Catch) [facility] (97552) - 599.71/R31.0, 789.00/R10.9, 789.09/R10.9 - 07/15/2020  o CT Abdomen and Pelvis without and with Contrast UROLOGY PROTOCOL (includes delayed imaging) [facility] (37613) - 599.71/R31.0, 789.00/R10.9, 789.09/R10.9 - " 07/15/2020  · Medications  o Medications have been Reconciled  o Transition of Care or Provider Policy  · Instructions  o Work-up for hematuria will be performed. A CT scan of the abdomen and pelvis with and without IV contrast will be performed to evaluate the kidneys and ureters. A flexible cystoscopy will be performed by Dr. Vu in the office at the next visit to evaluate the bladder.   o Electronically Identified Patient Education Materials Provided Electronically  · Disposition  o Call or Return if symptoms worsen or persist.            Electronically Signed by: NAHOMY Zheng -Author on August 3, 2020 03:54:31 PM

## 2021-05-13 NOTE — PROGRESS NOTES
Progress Note      Patient Name: Neda Núñez   Patient ID: [id number]   Sex: Female   YOB: 1955    Primary Care Provider: Yolanda COREA   Referring Provider: Yolanda COREA    Visit Date: June 26, 2020    Provider: NAHOMY Colon   Location: [facility]   Location Address: [address] [place] [address], [address] [place] [address]  [id number]   Location Phone: [phone]          History Of Present Illness  TELEHEALTH TELEPHONE VISIT  Chief Complaint: f/u labs   Neda Núñez is a 65 year old /White female who is presenting for evaluation via telehealth TELEPHONE visit. Verbal consent obtained before beginning visit.   Provider spent 15 minutes with patient during telehealth visit.   The following staff were present during this visit: Esdras CHRISTENSEN   Past Medical History/Overview of Patient Symptoms     ROS:  Eyes: while talking she mentions she's had some lights and eye pain when she bent over yesterday and some this morning. she doesn't think its a migraine and wondered if its diabetes as she had gestational diabetes long time ago. she did lay down and put a cool pack on her head and it relieved her headache  denies: nausea, vomiting, dizziness  : she says she still has some of the frequency, denies flank pain, fever. she has taken 2 doses of the cefdnir  denies: any signs of blood in urine  muscul: pt then started talking about her growth on top of her clavicle. she feels its growing bigger.she did go to oncology and was sent ENT who told her she needed to go back to the surgeon that had initially done her surgery on that side of her chest.  She does not think it has any relations to that and she does not want a go to [place] and says she does not think the doctor is even there anymore                 Assessment  · Headache     784.0/R51  · Screening for lipid disorders     V77.91/Z13.220  · Screening for cardiovascular condition     V81.2/Z13.6  · Screening for  deficiency anemia     V78.1/Z13.0  · Screening for thyroid disorder     V77.0/Z13.29  · Lipoma of other specified sites     214.8/D17.79  · Screening for diabetes mellitus     V77.1/Z13.1      Plan  · Orders  o Hgb A1c [facility] (45701) - V77.1/Z13.1 - 06/26/2020  o Physical, Primary Care Panel (CBC, CMP, Lipid, TSH) [facility] (55576, 88815, 63542, 23155) - V77.91/Z13.220, V81.2/Z13.6, V78.1/Z13.0, V77.0/Z13.29 - 06/26/2020  o ACO-39: Current medications updated and reviewed () - - 06/26/2020  o Physician Telephone Evaluation, 11-20 minutes (48525) - V77.91/Z13.220, V81.2/Z13.6, V78.1/Z13.0, V77.0/Z13.29 - 06/26/2020  o General Surgery Consult (GNSUR) - 214.8/D17.79 - 06/26/2020   she has seen ENT and oncology. ENT indicated it is a lipoma R clavicle  · Instructions  o Plan Of Care:   o Rest. Increase Fluids.  o Instructed patient if she has any visual field losses at all or lights that she is noticed persists, headache that is very painful, she is not to wait she needs to go to the emergency room for evaluation  o . noted on the ENT note it was a lipoma and said it looked unchanged from 1804-7952. I asked patient and she want to be referred to general surgeon have them look at it and see if it could be removed since she is not sure who MD was and does not want to go to [place] and she does want to do this  o urine culture was neg for infection. I did tell her to avoid the coffee and drink water. she felt the antibiotic made her feel better   o also wants new labs ordered            Electronically Signed by: Yolanda Vega APRN -Author on June 26, 2020 12:55:49 PM

## 2021-05-13 NOTE — PROGRESS NOTES
Progress Note      Patient Name: Neda Núñez   Patient ID: [id number]   Sex: Female   YOB: 1955    Primary Care Provider: Yolanda COREA   Referring Provider: Yolanda COREA    Visit Date: September 9, 2020    Provider: Kadie Vu MD   Location: [facility]   Location Address: [address] [place] [address]  [id number]   Location Phone: [phone]          Chief Complaint  · pt is here for urological concerns      History Of Present Illness  The patient returns for a routine cancer follow-up visit, a 65 year old /White female, who underwent left nephroureterectomy for a left renal pelvis urothelial carcinoma, high grade, in September 2020. The pathologic stage was T3, N0, M0.   There is no clinical evidence of recurrence of the cancer.   She states since the last visit, there has been no hematuria, abdominal pain, weight loss, or pulmonary symptoms.   Since surgery, follow-up diagnostic testing have not been done.      Her pathology revealed a high grade urothelial carcinoma with negative margins.  It was invasive into renal parenchyma.  No tumors were found in the ureter.      She had a cystogram today which was normal.  Her booker was removed.       Past Medical History  Allergies; Lung cancer; Reflux; Ureteral mass         Past Surgical History  Back Surgery, Lumbar; Cystoscopy and ureteroscopy with stent placement; Cystoscopy, with ureteral biopsy; Leg Surgery; Lung Surgery         Medication List  hydrocodone-acetaminophen 5-325 mg oral tablet         Allergy List  PENICILLINS; SULFA (SULFONAMIDES)       Allergies Reconciled  Family Medical History  Cancer, Unspecified; Migraine Headaches; Lung cancer; *No Known Family History         Social History  Alcohol (Light); Tobacco (Former)         Immunizations  Name Date Admin   Influenza          Review of Systems  · Constitutional  o Denies  o : chills, fever  · Gastrointestinal  o Denies  o :  "nausea, vomiting      Vitals  Date Time BP Position Site L\R Cuff Size HR RR TEMP (F) WT  HT  BMI kg/m2 BSA m2 O2 Sat HC       09/09/2020 01:10 /68 Sitting       131lbs 0oz 5'  2\" 23.96 1.61           Physical Examination  · Constitutional  o Appearance  o : Well nourished, well developed patient in no acute distress. Ambulating without difficulty.  · Head and Face  o Head  o :   § Inspection  § : atraumatic, normocephalic  o Face  o :   § Inspection  § : no facial lesions  · Eyes  o Sclerae  o : sclerae white  · Ears, Nose, Mouth and Throat  o Ears  o :   § External Ears  § : appearance within normal limits, no lesions present  o Nose  o :   § External Nose  § : appearance normal  · Neck  o Inspection/Palpation  o : normal appearance, trachea midline  · Respiratory  o Respiratory Effort  o : breathing unlabored  · Gastrointestinal  o Abdominal Examination  o : abdomen nontender to palpation, tone normal without rigidity or guarding, no masses present, incision healing well, abdominal contour scaphoid  · Skin and Subcutaneous Tissue  o General Inspection  o : No rashes, lesions or areas of discoloration present. Skin turgor is normal.  · Neurologic  o Mental Status Examination  o :   § Orientation  § : grossly oriented to person, place and time  § Speech/Language  § : communication ability within normal limits  o Gait and Station  o : normal gait, able to stand without difficulty  · Psychiatric  o Judgement and Insight  o : judgment and insight intact, judgement for everyday activities and social situations within normal limits, insight intact  o Mood and Affect  o : mood normal, affect appropriate              Assessment  · Urothelial carcinoma of kidney, left     189.0/C64.2      Plan  · Medications  o Medications have been Reconciled  o Transition of Care or Provider Policy  · Instructions  o DISCUSSION:  o The patient has done well after the treatment for renal cell carcinoma. At this point, there is no " apparent evidence of recurrence. I have recommended continued scheduled follow-up. She has no new post op issues. Vidal removed today.   o FOLLOW-UP:  o In 1 month  o Electronically Identified Patient Education Materials Provided Electronically            Electronically Signed by: Kadie Vu MD -Author on September 9, 2020 01:42:41 PM

## 2021-05-13 NOTE — PROGRESS NOTES
Progress Note      Patient Name: Neda Núñez   Patient ID: [id number]   Sex: Female   YOB: 1955    Primary Care Provider: Yolanda COREA   Referring Provider: Yolanda COREA    Visit Date: 2020    Provider: Kadie Vu MD   Location: [facility]   Location Address: [address] [place] [address]  [id number]   Location Phone: [phone]          History Of Present Illness  PMH: She was found to have gross hematura at Yol[facility] C[facility] office on 2020. This has been present for 1 month. She has not had a workup for hematuria in the past. She denies any UTI's within the past year. She admits to frequency & urgency. Denies any dysuria. She does not have a history of kidney stones. She admits to left sided abdomen and flank pain. Denies any fever or chills. Was treated with antibiotics, despite urine culture being negative. Denies any recent falls. Does not take any blood thinners.   TELEHEALTH TELEPHONE VISIT  Neda Núñez is a 65 year old /White female who is presenting for evaluation via telehealth telephone visit. Verbal consent obtained before beginning visit.   Provider spent 12 minutes with the patient during the telehealth visit.   The following staff were present during this visit: Isabell Mcguire and Kadie Vu MD   Past Medical History/ Overview of Patient Symptoms     Patient reports that she has seen her urine go from pink color to brown with 3 clots over the last month.    Patient has a personal history of lung cancer and had chemo for treatment.    Recent CT scan and then MRI revealed a possible upper pole renal pelvis mass that is concerning for a UC.      20:  She then had a ureteroscopy and biopsy of a right renal pelvis mass that showed a high grade Ta urothelial cancer.  We discussed this today on her phone visit.        Previous urinalysis:  2020:  color: yellow  clarity: clear  pH: 7.5  S.016  Pro:  negative  Glu: negative  Ket: negative  Occ: large  Nit: negative  Bili: negative  Uro: 0.2  Leuko: negative  Urine culture - No uropathogens identified.       Past Medical History  Allergies; Lung cancer; Reflux; Ureteral mass         Past Surgical History  Back Surgery, Lumbar; Cystoscopy and ureteroscopy with stent placement; Cystoscopy, with ureteral biopsy; Leg Surgery; Lung Surgery         Allergy List  PENICILLINS; SULFA (SULFONAMIDES)       Allergies Reconciled  Family Medical History  Cancer, Unspecified; Migraine Headaches; Lung cancer; *No Known Family History         Social History  Alcohol (Light); Tobacco (Former)         Immunizations  Name Date Admin   Influenza          Review of Systems  · Constitutional  o Denies  o : fatigue, fever  · Gastrointestinal  o Denies  o : nausea, vomiting              Assessment  · Gross hematuria     599.71/R31.0  · Abdominal Pain     789.00/R10.9  · Flank pain     789.09/R10.9  · Urothelial cancer     189.8/C68.9      Plan  · Orders  o Physican Telephone evaluation, 5-10 min (69949) - 599.71/R31.0, 789.00/R10.9, 789.09/R10.9 - 08/26/2020  · Medications  o Medications have been Reconciled  o Transition of Care or Provider Policy  · Instructions  o She will need a right robotic nephroureterectomy. Risks and benefits discussed with patient who is agreeable to proceed.             Electronically Signed by: Kadie Vu MD -Author on August 26, 2020 02:43:56 PM

## 2021-05-13 NOTE — PROGRESS NOTES
Progress Note      Patient Name: Neda Núñez   Patient ID: [id number]   Sex: Female   YOB: 1955    Primary Care Provider: Yolanda COREA   Referring Provider: Yolanda COREA    Visit Date: August 26, 2020    Provider: Kadie Vu MD   Location: [facility]   Location Address: [address] [place] [address]  [id number]   Location Phone: [phone]          Chief Complaint  · History & Physical / Surgical Orders      History Of Present Illness  [facility]  Inpatient History and Physical Surgical Orders    Preadmission Location: [facility] Preadmission Time: 01:30 PM   Which Facility: [facility] Surgery Date: 08/27/2020 Surgery Time: 07:15 AM Preadmission Testing Date: 09/02/2020   Patient's Name: Neda Núñez YOB: 1955   Chief complaint/history present illness: Left Renal Pelvis Mass   Current Medication List: no medications   Allergies: PENICILLINS and SULFA (SULFONAMIDES)   Significant past medical: Allergies, Lung cancer, Reflux, and Ureteral mass   Past Surgical History: Back Surgery, Lumbar, Cystoscopy and ureteroscopy with stent placement, Cystoscopy, with ureteral biopsy, Leg Surgery, and Lung Surgery   Examination of heart and lungs: Regular rate, rhythm, no murmur, gallop, rub, Breath sounds normal, no distress, and Abdomen soft, non-tender, BSx4 are positive         Allergy List    Allergies Reconciled          Assessment  · Encounter for preoperative examination for general surgical procedure     V72.84/Z01.818  · Ureteral mass     593.9/N28.9  · Pre-Surgical Orders     V72.84      Plan  · Orders  o General Urology Surgery Order ([facility]OSU) - V72.84/Z01.818, 593.9/N28.9 - 09/02/2020  o EKG (Recording only) [facility] (48556) - V72.84/Z01.818, 593.9/N28.9 - 08/27/2020  o CBC (30008) - V72.84/Z01.818, 593.9/N28.9 - 08/27/2020  o CMP (73124) - V72.84/Z01.818, 593.9/N28.9 - 08/27/2020  o PT with INR  (93958) - V72.84/Z01.818, 593.9/N28.9 - 08/27/2020  o Type and Screen per unit (26837) - V72.84/Z01.818, 593.9/N28.9 - 08/27/2020  o CXR (PA and Lateral) (43319) - V72.84/Z01.818, 593.9/N28.9 - 08/27/2020  o [facility] Pre-Op Covid-19 Screening (82006) - V72.84/Z01.818 - 08/27/2020  o Cystogram (39816) - V72.84/Z01.818, 593.9/N28.9 - 09/09/2020  · Medications  o Medications have been Reconciled  o Transition of Care or Provider Policy  · Instructions  o *****Surgical Orders******  o Pre-Operative Orders: Sign permit for Robotic Left Nephroureterectomy  o ****Patient Status****  o Admit for INPATIENT services; Anticipated length of stay greater than two midnights  o ********************  o General Sedation  o IV Fluids: LR @ 100 cc/hour  o IV Antibiotics (on call to OR):  o Levaquin 500 mg IV OCTOR.  o RISK AND BENEFITS:  o Possible risks/complications, benefits and alternatives to surgical or invasive procedure have been explained to patient and/or legal guardian.  o Pre-Operative Orders: Sign permit for  o Electronically Identified Patient Education Materials Provided Electronically            Electronically Signed by: Isabell Mcguire, -Author on September 1, 2020 10:35:43 AM  Electronically Co-signed by: Kadie Vu MD -Reviewer on September 1, 2020 01:46:46 PM

## 2021-05-13 NOTE — PROGRESS NOTES
Progress Note      Patient Name: Neda Núñez   Patient ID: [id number]   Sex: Female   YOB: 1955    Primary Care Provider: Yolanda COREA   Referring Provider: Yolanda COREA    Visit Date: August 13, 2020    Provider: Rustam Wheat MD   Location: [facility]   Location Address: [address] [place] [address]  [id number]   Location Phone: [phone]          Chief Complaint  · Follow Up      History Of Present Illness     65-year-old woman here for follow-up of her visual auras.  She states that she has not had any visual symptomatology since the last time I saw her.  She did not get the sedimentation rate performed.  She had MRI of the brain, MRA of the brain and carotids which are all unremarkable.       Past Medical History  Allergies; Lung cancer; Reflux         Past Surgical History  Back Surgery, Lumbar; Leg Surgery; Lung Surgery         Allergy List  PENICILLINS; SULFA (SULFONAMIDES)         Family Medical History  Cancer, Unspecified; Migraine Headaches; Lung cancer; *No Known Family History         Social History  Alcohol (Light); Tobacco (Former)         Immunizations  Name Date Admin   Influenza          Review of Systems  · Constitutional  o Admits  o : fatigue  o Denies  o : chills, excessive sweating, fever, sycope/passing out, weight gain, weight loss  · Eyes  o Denies  o : changes in vision, blurry vision, double vision  · HENT  o Denies  o : loss of hearing, ringing in the ears, ear aches, sore throat, nasal congestion, sinus pain, nose bleeds, seasonal allergies  · Cardiovascular  o Denies  o : blood clots, swollen legs, anemia, easy burising or bleeding, transfusions  · Respiratory  o Denies  o : shortness of breath, dry cough, productive cough, pneumonia  · Gastrointestinal  o Denies  o : difficulty swallowing, reflux  · Genitourinary  o Denies  o : incontinence  · Neurologic  o Admits  o : dizziness/vertigo, numbness/tingling/paresthesia  "  o Denies  o : headache, seizure, stroke, tremor, loss of balance, falls, difficulty with sleep, difficulty with coordination, difficulty with dexterity, weakness  · Musculoskeletal  o Admits  o : low back pain  o Denies  o : neck stiffness/pain, swollen lymph nodes, muscle aches, joint pain, weakness, spasms, pain radiating in arm, pain radiating in leg  · Endocrine  o Denies  o : diabetes, thyroid disorder  · Psychiatric  o Admits  o : depression  o Denies  o : anxiety      Vitals  Date Time BP Position Site L\R Cuff Size HR RR TEMP (F) WT  HT  BMI kg/m2 BSA m2 O2 Sat HC       08/13/2020 11:32 /84 Sitting    74 - R   137lbs 0oz 5'  2\" 25.06 1.65           Physical Examination     She is alert, fluent, phasic, follows commands well           Assessment  · Visual aura     368.9/H53.9  She is to get a sedimentation rate and she is to call us to find out the results. I told her to make a follow-up visit with us again in the future should she have recurrent symptoms of her visual aura and headaches.    10 minutes was spent for the straightforward visit more than half the time spent face-to-face with the patient for examination, counseling, planning and recommendations    Problems Reconciled  Plan  · Medications  o Medications have been Reconciled  o Transition of Care or Provider Policy  · Instructions  o Encouraged to follow-up with Primary Care Provider for preventative care.            Electronically Signed by: Rustam Wheat MD -Author on August 13, 2020 12:19:49 PM  "

## 2021-05-13 NOTE — PROGRESS NOTES
Progress Note      Patient Name: Neda Núñez   Patient ID: [id number]   Sex: Female   YOB: 1955    Primary Care Provider: Yolanda COREA   Referring Provider: Yolanda COREA    Visit Date: October 1, 2020    Provider: Kadie Vu MD   Location: [facility]   Location Address: [address] [place] [address]  [id number]   Location Phone: [phone]          History Of Present Illness  The patient returns for a routine cancer follow-up visit, a 65 year old /White female, who underwent left nephroureterectomy for a left renal pelvis urothelial carcinoma, high grade, in September 2020. The pathologic stage was T3, N0, M0.   There is no clinical evidence of recurrence of the cancer.   She states since the last visit, there has been no hematuria, abdominal pain, weight loss, or pulmonary symptoms.   Since surgery, follow-up diagnostic testing have not been done.      Her pathology revealed a high grade urothelial carcinoma with negative margins.  It was invasive into renal parenchyma.  No tumors were found in the ureter.      She had a cystogram  one week out from surgery which was normal.  Her booker was removed.    She is one month out from surgery today and is doing well with only mild lower abdominal and flank pain occasionally.  She has no other complaints.   TELEHEALTH TELEPHONE VISIT  Neda Núñez is a 65 year old /White female who is presenting for evaluation via telehealth telephone visit. Verbal consent obtained before beginning visit.   Provider spent 8 minutes with the patient during the telehealth visit.   The following staff were present during this visit: Isabell Mcguire and Kadie Vu MD       Past Medical History  Allergies; Lung cancer; Reflux; Ureteral mass         Past Surgical History  Back Surgery, Lumbar; Cystoscopy and ureteroscopy with stent placement; Cystoscopy, with ureteral biopsy; Leg Surgery; Lung Surgery          Allergy List  PENICILLINS; SULFA (SULFONAMIDES)       Allergies Reconciled  Family Medical History  Cancer, Unspecified; Migraine Headaches; Lung cancer; *No Known Family History         Social History  Alcohol (Light); Tobacco (Former)         Immunizations  Name Date Admin   Influenza 11/08/2019                 Assessment  · Urothelial carcinoma of kidney, left     189.0/C64.2      Plan  · Orders  o Physican Telephone evaluation, 5-10 min (14270) - 189.0/C64.2 - 10/01/2020  · Medications  o Medications have been Reconciled  o Transition of Care or Provider Policy  · Instructions  o DISCUSSION:  o The patient has done well after the treatment for renal cell carcinoma. At this point, there is no apparent evidence of recurrence. I have recommended continued scheduled follow-up. She has no new post op issues. She will have a cystoscopy in the office in 2 months.   o FOLLOW-UP:  o In 2 months  o Plan Of Care:             Electronically Signed by: Kadie Vu MD -Author on October 1, 2020 10:54:15 PM

## 2021-05-13 NOTE — PROGRESS NOTES
Progress Note      Patient Name: Neda Núñez   Patient ID: [id number]   Sex: Female   YOB: 1955    Primary Care Provider: Yolanda COREA   Referring Provider: Yolanda COREA    Visit Date: June 25, 2020    Provider: NAHOMY Colon   Location: [facility]   Location Address: [address] [place] [address], [address] [place] [address]  [id number]   Location Phone: [phone]          History Of Present Illness  TELEHEALTH TELEPHONE VISIT  Chief Complaint: f/u urinary problem   Neda Núñez is a 65 year old /White female who is presenting for evaluation via telehealth TELEPHONE visit. Verbal consent obtained before beginning visit. pt alone for visit.   Provider spent 15 minutes with patient during telehealth visit.   The following staff were present during this visit: Marcial CHRISTENSEN   Past Medical History/Overview of Patient Symptoms  Neda Núñez is a 65 year old /White female who presents for evaluation and treatment of: day before yesterday called and said she had blood in her urine but described today as it was an orangy color. no fever,back pain. today f/u urinalysis      ROS:  gu: she did report having frequency but does not have to get up. in talking she does drink coffee all day and not a lot of water. she googled and thinks stress may have caused some of this problem as she does have a lot.   denies: any pink tinged urine today       Review of Systems  · Constitutional  o Denies  o : fatigue, fever  · Gastrointestinal  o Denies  o : nausea, vomiting  · Genitourinary  o Denies  o : dysuria, urinary retention  · Musculoskeletal  o Admits  o : R lower abd has some discomfort  o Denies  o : flank pain          Assessment  · Urinary tract infection symptoms     788.99/R39.9  · Stress at home     V61.9/F43.9      Plan  · Orders  o ACO-39: Current medications updated and reviewed () - - 06/25/2020  o Physician Telephone Evaluation, 11-20 minutes (03926) -  788.99/R39.9, V61.9/F43.9 - 06/25/2020  · Medications  o cefdnir 300 mg   SIG: tab 1 po bid x 7 days   DISP: (14) with 0 refills  Prescribed on 06/25/2020     o Medications have been Reconciled  o Transition of Care or Provider Policy  · Instructions  o Plan Of Care:   o Take all medications as prescribed/directed.  o Rest. Increase Fluids.  o we discussed reducing the coffee as it is a bladder irritant. she does need to incorporate water.   o I recommended macrodantin to start with until we got the culture results but she wanted cefdnir. informed if there is an organism that is not sensitive to current atb may have to change it.  o discussed results with her re: urinalysis, waiting on culture results  · Disposition  o Call or Return if symptoms worsen or persist.            Electronically Signed by: Yolanda Vega APRN -Author on June 25, 2020 09:33:09 AM

## 2021-05-13 NOTE — PROGRESS NOTES
Progress Note      Patient Name: Neda Núñez   Patient ID: [id number]   Sex: Female   YOB: 1955    Primary Care Provider: Yolanda COREA   Referring Provider: Yolanda COREA    Visit Date: 2020    Provider: Kadie Vu MD   Location: [facility]   Location Address: [address] [place] [address]  [id number]   Location Phone: [phone]          History Of Present Illness  She was found to have gross hematura at Yolanda OCREA office on 2020. This has been present for 1 month. She has not had a workup for hematuria in the past. She denies any UTI's within the past year. She admits to frequency & urgency. Denies any dysuria. She does not have a history of kidney stones. She admits to left sided abdomen and flank pain. Denies any fever or chills. Was treated with antibiotics, despite urine culture being negative. Denies any recent falls. Does not take any blood thinners.   TELEHEALTH TELEPHONE VISIT  Neda Núñez is a 65 year old /White female who is presenting for evaluation via telehealth telephone visit. Verbal consent obtained before beginning visit.   Provider spent 8 minutes with the patient during the telehealth visit.   The following staff were present during this visit: Isabell Mcguire and Kadie Vu MD   Past Medical History/ Overview of Patient Symptoms     Patient reports that she has seen her urine go from pink color to brown with 3 clots over the last month.    Patient has a personal history of lung cancer and had chemo for treatment.    Recent CT scan and then MRI revealed a possible upper pole renal pelvis mass that is concerning for a UC.  She will need a ureteroscopy.  This was explained to her by Marleny Leija NP.        Previous urinalysis:  2020:  color: yellow  clarity: clear  pH: 7.5  S.016  Pro: negative  Glu: negative  Ket: negative  Occ: large  Nit: negative  Bili: negative  Uro: 0.2  Leuko:  negative  Urine culture - No uropathogens identified.       Past Medical History  Allergies; Lung cancer; Reflux         Past Surgical History  Back Surgery, Lumbar; Leg Surgery; Lung Surgery         Allergy List  PENICILLINS; SULFA (SULFONAMIDES)       Allergies Reconciled  Family Medical History  Cancer, Unspecified; Migraine Headaches; Lung cancer; *No Known Family History         Social History  Alcohol (Light); Tobacco (Former)         Immunizations  Name Date Admin   Influenza          Review of Systems  · Constitutional  o Denies  o : fatigue, fever  · Gastrointestinal  o Denies  o : nausea, vomiting              Assessment  · Gross hematuria     599.71/R31.0  · Abdominal Pain     789.00/R10.9  · Flank pain     789.09/R10.9      Plan  · Orders  o Physican Telephone evaluation, 5-10 min ([id number]) - 599.71/R31.0, 789.00/R10.9, 789.09/R10.9 - 08/18/2020  · Instructions  o She will have a cystoscopy and left ureteroscopy with possible biopsy as an outpatient. Risks and benefits discussed with patient.  o Plan Of Care:             Electronically Signed by: Kadie Vu MD -Author on August 18, 2020 04:52:40 PM

## 2021-05-13 NOTE — PROGRESS NOTES
Progress Note      Patient Name: Neda Núñez   Patient ID: [id number]   Sex: Female   YOB: 1955    Primary Care Provider: Yolanda COREA   Referring Provider: Yolanda COREA    Visit Date: July 6, 2020    Provider: NAHOMY Colon   Location: [facility]   Location Address: [address] [place] [address], [address] [place] [address]  [id number]   Location Phone: [phone]          History Of Present Illness  TELEHEALTH TELEPHONE VISIT  Chief Complaint: blood in urine and headaches   Neda Núñez is a 65 year old /White female who is presenting for evaluation via telehealth telephone visit. Verbal consent obtained before beginning visit. pt was alone for visit.   Provider spent 15 minutes with patient during telehealth visit.   The following staff were present during this visit: Marcial CHRISTENSEN   Past Medical History/Overview of Patient Symptoms  Neda Núñez is a 65 year old /White female who presents for evaluation and treatment of: pt feels the stress she's had may be causing her headaches. has pain in her R eye and sees lights. she lays down and this does help. had one last night and the other day       Review of Systems  · Constitutional  o Denies  o : fever, fatigue, weight loss, weight gain  · Eyes  o Admits  o : she's had 2 headaches in last couple of weeks. one last night and pain is in the R eye. no loss of vision and it does go away. she has taken a Tylenol. she's not on any medications as she stopped the Flonase and the Zyrtec,  o Denies  o : discharge from eye, eye pain, impaired vision  · HENT  o Admits  o : headache, she's never been diagnosed with migraines but this seems to be like one she says  o Denies  o : vertigo, lightheadedness, recent head injury, sinus pain, nasal congestion, postnasal drip  · Cardiovascular  o Denies  o : lower extremity edema, claudication, chest pressure, palpitations  · Respiratory  o Denies  o : shortness of breath,  "wheezing, cough, hemoptysis, dyspnea on exertion  · Gastrointestinal  o Denies  o : nausea, vomiting, diarrhea, constipation, abdominal pain  · Genitourinary  o Admits  o : hematuria. She is noticed pink-tinged urine in once she has found look like a little clot that was in her urine. No pain with urination  o Denies  o : urgency, frequency, dysuria, oliguria, urinary retention  · Neurologic  o Admits  o : she has never been on medication for headaches  o Denies  o : altered mental status, memory difficulties  · Psychiatric  o Admits  o : she is having a lot of stress with the change in her mother, and her grand kids are not able to stay there with her like they usually do because of the change in the grandmother. She does avoid taking medications if at all possible  o Denies  o : depression, suicidal ideation      Vitals  Date Time BP Position Site L\R Cuff Size HR RR TEMP (F) WT  HT  BMI kg/m2 BSA m2 O2 Sat HC       07/06/2020 09:00 AM       14  134lbs 0oz 5'  2\" 24.51 1.63               Assessment  · Ocular headache     784.0/R51  · Hematuria     599.70/R31.9  · Stress at home     V61.9/F43.9      Plan  · Orders  o ACO-39: Current medications updated and reviewed () - - 07/06/2020  o Physician Telephone Evaluation, 11-20 minutes (34697) - 784.0/R51, 599.70/R31.9 - 07/06/2020  o NEUROLOGY CONSULTATION. (NEURO) - 784.0/R51 - 07/06/2020   pt not on medication yet. is seeing opthamology first  · Medications  o Medications have been Reconciled  o Transition of Care or Provider Policy  · Instructions  o Plan Of Care:   o we discussed medication such as Topamax but she wants to have her eye checked. [facility] and got an appt for July 15 with Dr. Jeong.  o appt was made with urology July 16 at 3:45 pt made aware  o if she should have more intense pain or any loss of visual field she's to go to ER   · Disposition  o Follow up in 1 month            Electronically Signed by: Yolanda Vega, " APRN -Author on July 6, 2020 05:26:29 PM

## 2021-05-14 VITALS
HEIGHT: 62 IN | BODY MASS INDEX: 25.44 KG/M2 | DIASTOLIC BLOOD PRESSURE: 79 MMHG | WEIGHT: 138.25 LBS | SYSTOLIC BLOOD PRESSURE: 139 MMHG

## 2021-05-14 VITALS
HEART RATE: 76 BPM | WEIGHT: 142.37 LBS | DIASTOLIC BLOOD PRESSURE: 80 MMHG | BODY MASS INDEX: 26.2 KG/M2 | HEIGHT: 62 IN | SYSTOLIC BLOOD PRESSURE: 138 MMHG

## 2021-05-14 VITALS
HEART RATE: 79 BPM | WEIGHT: 139.37 LBS | DIASTOLIC BLOOD PRESSURE: 77 MMHG | HEIGHT: 62 IN | TEMPERATURE: 97.3 F | BODY MASS INDEX: 25.65 KG/M2 | SYSTOLIC BLOOD PRESSURE: 151 MMHG | OXYGEN SATURATION: 98 %

## 2021-05-14 VITALS
HEIGHT: 62 IN | DIASTOLIC BLOOD PRESSURE: 68 MMHG | WEIGHT: 131 LBS | BODY MASS INDEX: 24.11 KG/M2 | SYSTOLIC BLOOD PRESSURE: 115 MMHG

## 2021-05-14 VITALS — RESPIRATION RATE: 16 BRPM | BODY MASS INDEX: 25.76 KG/M2 | HEIGHT: 62 IN | WEIGHT: 140 LBS

## 2021-05-14 VITALS
WEIGHT: 141.25 LBS | HEIGHT: 62 IN | BODY MASS INDEX: 25.99 KG/M2 | SYSTOLIC BLOOD PRESSURE: 133 MMHG | DIASTOLIC BLOOD PRESSURE: 83 MMHG

## 2021-05-14 NOTE — PROGRESS NOTES
Progress Note      Patient Name: Neda Núñez   Patient ID: [id number]   Sex: Female   YOB: 1955    Primary Care Provider: Jeff Art MD   Referring Provider: Jeff Art MD    Visit Date: April 14, 2021    Provider: NAHOMY Zheng   Location: [facility]   Location Address: [address] [place] [address]  [id number]   Location Phone: [phone]          Chief Complaint  · Follow Up Colonoscopy and EGD      History Of Present Illness  Neda Núñez is a 65 year old /White female who is here to follow up colonoscopy and EGD.      Patient presents today for follow-up visit after undergoing a EGD and colonoscopy on 3/31/2021 performed by Dr. Raymundo Castano.  Patient was with gastritis per EGD/pathology.  Patient was also with some diverticulosis of the sigmoid colon per colonoscopy.  Patient denies any postoperative complications       Past Medical History  Disease Name Date Onset Notes   Allergies --  --    Lung cancer --  --    Reflux --  --    Shortness Of Air --  --    Ureteral mass --  --    Urothelial cancer 12/03/2020 --          Past Surgical History  Procedure Name Date Notes   Back Surgery, Lumbar --  --    Cystoscopy and ureteroscopy with stent placement --  --    Cystoscopy, with ureteral biopsy --  --    Kidney --  --    Leg Surgery --  --    Lung Surgery --  --          Medication List  Name Date Started Instructions   clotrimazole 1 % topical cream 04/14/2021 apply to the affected and surrounding areas of skin by topical route 2 times per day in the morning and evening         Allergy List  Allergen Name Date Reaction Notes   PENICILLINS --  --  --    SULFA (SULFONAMIDES) --  --  --        Allergies Reconciled  Family Medical History  Disease Name Relative/Age Notes   Cancer, Unspecified  --    Diabetes, unspecified type Brother/  Grandfather (maternal)/   Grandfather (maternal); Brother   Migraine Headaches  --    Lung cancer  --    Family history  "of breast cancer Grandmother (paternal)/   Grandmother (paternal)         Social History  Finding Status Start/Stop Quantity Notes   Alcohol Light --/-- --  07/30/2020 - Rarely    Tobacco Former 17/53 40/day 07/30/2020 -          Review of Systems  · Constitutional  o Denies  o : chills, fever  · Eyes  o Denies  o : yellowish discoloration of eyes  · HENT  o Denies  o : difficulty swallowing  · Cardiovascular  o Denies  o : chest pain on exertion  · Respiratory  o Denies  o : shortness of breath  · Gastrointestinal  o Denies  o : nausea, vomiting, diarrhea, constipation  · Genitourinary  o Denies  o : abnormal color of urine  · Integument  o Denies  o : rash  · Neurologic  o Denies  o : tingling or numbness  · Musculoskeletal  o Denies  o : joint pain  · Endocrine  o Denies  o : weight gain, weight loss      Vitals  Date Time BP Position Site L\R Cuff Size HR RR TEMP (F) WT  HT  BMI kg/m2 BSA m2 O2 Sat FR L/min FiO2 HC       04/14/2021 10:09 /83 Sitting       141lbs 4oz 5'  2\" 25.83 1.67             Physical Examination  · Constitutional  o Appearance  o : well developed, well-nourished, patient in no apparent distress  · Head and Face  o Head  o :   § Inspection  § : atraumatic, normocephalic  o Face  o :   § Inspection  § : no facial lesions  · Eyes  o Conjunctivae  o : conjunctivae normal  o Sclerae  o : sclerae white  · Neck  o Inspection/Palpation  o : normal appearance, no masses or tenderness, trachea midline  · Respiratory  o Respiratory Effort  o : breathing unlabored  · Skin and Subcutaneous Tissue  o General Inspection  o : no lesions present, no areas of discoloration, skin turgor normal, texture normal  · Neurologic  o Mental Status Examination  o :   § Orientation  § : grossly oriented to person, place and time  § Attention  § : attention normal, concentration abilities normal  § Fund of Knowledge  § : fund of knowledge within normal limits, patient aware of current events  o Gait and " Station  o : normal gait, able to stand without difficulty  · Psychiatric  o Judgement and Insight  o : judgment and insight intact  o Mood and Affect  o : mood normal, affect appropriate              Assessment  · Diverticulosis     562.10/K57.90  · Gastritis     535.50/K29.70  · S/P colonoscopy     V45.89/Z98.890  · S/P endoscopy     V45.89/Z98.890    Problems Reconciled  Plan  · Medications  o Medications have been Reconciled  o Transition of Care or Provider Policy  · Instructions  o Per the AGA guidelines of 2012 patient is to follow up for colonoscopy surveillance  o Rescreen: In 10 years follow-up in the interim as needed.  o Electronically Identified Patient Education Materials Provided Electronically  · Disposition  o Call or Return if symptoms worsen or persist.  o EMR dragon/transcription disclaimer: Much of this encounter note is an electronic transcription/translation of spoken language to printed text. Electronic translation of spoken language may permit erroneous, or at times nonsensical words or phrases to be inadvertently trasncribed; although I have reviewed the note for such errors, some may still exist.            Electronically Signed by: NAHOMY Zheng -Author on April 14, 2021 11:14:01 AM

## 2021-05-14 NOTE — PROGRESS NOTES
Progress Note      Patient Name: Neda Núñez   Patient ID: [id number]   Sex: Female   YOB: 1955    Primary Care Provider: Jeff Art MD   Referring Provider: Jeff Art MD    Visit Date: January 20, 2021    Provider: Jeff Art MD   Location: [facility]   Location Address: [address] [place] [address], [address] [place] [address]  [id number]   Location Phone: [phone]          Chief Complaint  · f/u kidneys and bone concerns  · patient has a history or lung cancer and bladder cancer. She is currently concerned about her right collar bone growth and left knee growth on bone. She states that over past year both have changed in size.      History Of Present Illness  Neda Núñez is a 65 year old /White female who presents for evaluation and treatment of:      65 years old female with past medical history of kidney cancer, lung cancer comes to the clinic today to establish care with new provider and also complaining of left knee growth and right collarbone medial growth.    Lung cancer; patient was treated in the past with radiation therapy and resection.  Patient is a former smoker.  Smoked for over 30 years.  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  Patient had left nephrectomy done.  Continue with follow-up.    Right collarbone medial growth; patient was seen by ENT and the general surgery.  Abnormal bone growth was diagnosed and discharged.    Patient currently denies any chest pain/shortness of breath/palpitations or any dizziness.       Past Medical History  Disease Name Date Onset Notes   Allergies --  --    Lung cancer --  --    Reflux --  --    Ureteral mass --  --    Urothelial cancer 12/03/2020 --          Past Surgical History  Procedure Name Date Notes   Back Surgery, Lumbar --  --    Cystoscopy and ureteroscopy with stent placement --  --    Cystoscopy, with ureteral biopsy --  --    Leg Surgery --   "--    Lung Surgery --  --          Allergy List  Allergen Name Date Reaction Notes   PENICILLINS --  --  --    SULFA (SULFONAMIDES) --  --  --          Family Medical History  Disease Name Relative/Age Notes   Cancer, Unspecified  --    Migraine Headaches  --    Lung cancer  --    *No Known Family History  --          Social History  Finding Status Start/Stop Quantity Notes   Alcohol Light --/-- --  07/30/2020 - Rarely    Tobacco Former --/-- --  07/30/2020 -          Immunizations  NameDate Admin Mfg Trade Name Lot Number Route Inj VIS Given VIS Publication   [id number]/08/2019 [facility] Fluzone Quadrivalent [id number] IM RA 11/08/2019    Comments:          Review of Systems  · Constitutional  o Denies  o : fatigue, night sweats  · Eyes  o Denies  o : double vision, blurred vision  · HENT  o Denies  o : vertigo, recent head injury  · Cardiovascular  o Denies  o : chest pain, irregular heart beats  · Respiratory  o Denies  o : shortness of breath, productive cough  · Gastrointestinal  o Denies  o : nausea, vomiting  · Genitourinary  o Denies  o : dysuria, urinary retention  · Integument  o Denies  o : hair growth change, new skin lesions  · Neurologic  o Denies  o : altered mental status, seizures  · Musculoskeletal  o Denies  o : joint swelling, limitation of motion  · Endocrine  o Denies  o : cold intolerance, heat intolerance  · Heme-Lymph  o Denies  o : petechiae, lymph node enlargement or tenderness  · Allergic-Immunologic  o Denies  o : frequent illnesses      Vitals  Date Time BP Position Site L\R Cuff Size HR RR TEMP (F) WT  HT  BMI kg/m2 BSA m2 O2 Sat FR L/min FiO2        01/20/2021 08:26 /77 Sitting    79 - R  97.3 139lbs 6oz 5'  2\" 25.49 1.66 98 %            Physical Examination  · Constitutional  o Appearance  o : well-nourished, well developed, alert, in no acute distress  · Eyes  o Conjunctivae  o : conjunctivae normal  o Sclerae  o : sclerae white  o Pupils and Irises  o : pupils equal, round, and " reactive to light and accommodation bilaterally  o Corneas  o : tear film normal, no lesions present  o Eyelids/Ocular Adnexae  o : eyelid appearance normal, no exudates present, eye moisture level normal  · Neck  o Inspection/Palpation  o : normal appearance, no masses or tenderness, trachea midline, no enlarged cervical or supraclavicular lymphnodes palpated, abnormal bone growth on right collarbone at medial aspect  o Thyroid  o : gland size normal, nontender, no nodules or masses present on palpation, thyroid motion normal during swallowing  · Respiratory  o Respiratory Effort  o : breathing unlabored  o Inspection of Chest  o : normal appearance, no retractions  o Auscultation of Lungs  o : normal breath sounds throughout  · Cardiovascular  o Heart  o :   § Auscultation of Heart  § : regular rate and rhythm without murmur, PMI normal  o Peripheral Vascular System  o :   § Carotid Arteries  § : normal pulses bilaterally, no bruits present  § Pedal Pulses  § : pulses 2 bilaterally  § Extremities  § : no cyanosis, clubbing or edema; less than 2 second refill noted  · Musculoskeletal  o General  o : No joint swelling or deformity noted. Muscle tone, strength and development grossly normal. Left knee anterior inferior abnormal bone growth, below patella,  · Skin and Subcutaneous Tissue  o General Inspection  o : no rashes or lesions present, no areas of discoloration  · Neurologic  o Mental Status Examination  o : judgement, insight intact, modd and affect appropriate  o Motor Examination  o : strength grossly intact in all four extremities  o Gait and Station  o : normal gait, able to stand without difficulty          Assessment  · Screening for depression     V79.0/Z13.89  · Screening for colon cancer     V76.51/Z12.11  · Visit for screening mammogram     V76.12/Z12.31  · History of lung cancer     V10.11/Z85.118  · Former smoker     V15.82/Z87.891  · Collar bone pain     733.90/M89.8X1  · Left knee  injury     959.7/S89.92XA  · Postmenopausal bone loss     733.01/M81.0  · History of kidney cancer     V10.52/Z85.528       --- Close follow-up as needed to better understand medical conditions and appropriate management       Plan  · Orders  o ACO-18: Negative screen for clinical depression using a standardized tool () - V79.0/Z13.89 - 01/20/2021  o COLONOSCOPY REFERRAL (COLON) - V76.51/Z12.11 - 01/20/2021  o Screening Mammography; Bilateral 3D (52284, 13724, ) - V76.12/Z12.31 - 01/20/2021  o Hgb A1c [facility] (48606) - V10.11/Z85.118, V15.82/Z87.891, 733.01/M81.0 - 01/20/2021  o Physical, Primary Care Panel (CBC, CMP, Lipid, TSH) [facility] (94886, 27935, 01752, 69612) - V10.11/Z85.118, V15.82/Z87.891, 959.7/S89.92XA, 733.01/M81.0 - 01/20/2021  o ACO-14: Influenza immunization administered or previously received [facility] () - - 01/20/2021  o ACO-39: Current medications updated and reviewed (1159F, ) - - 01/20/2021  o Low dose CT scan (LDCT) for lung cancer screening [facility] () - V10.11/Z85.118, V15.82/Z87.891 - 01/20/2021  o DEXA Bone Density, 1 or more sites, axial skeleton [facility] (35114) - V10.11/Z85.118, V15.82/Z87.891, 733.01/M81.0 - 01/20/2021  o Xray knee left [facility] ([id number]) - 959.7/S89.92XA - 01/20/2021   small bone growth noted on antro-inferior knee   · Medications  o Medications have been Reconciled  o Transition of Care or Provider Policy  · Instructions  o Depression Screen completed and scanned into the EMR under the designated folder within the patient's documents.  o Today's PHQ-9 result is _0__  o The provider screening met the required time of 15 minutes.  o Patient was educated/instructed on their diagnosis, treatment and medications prior to discharge from the clinic today.  o Patient was instructed to exercise regularly.  o Patient instructed to seek medical attention urgently for new or worsening symptoms.  o Call the office with any concerns or questions.  o Minutes spent with  patient including greater than 50% in Education/Counseling/Care Coordination.  o Time spent with the patient was minutes, more than 50% face to face.  o Discussed Covid-19 precautions including, but not limited to, social distancing, avoid touching your face, and hand washing.   · Disposition  o Call or Return if symptoms worsen or persist.  o Follow Up in 1 month.            Electronically Signed by: Jeff Art MD -Author on January 20, 2021 01:05:16 PM

## 2021-05-14 NOTE — PROGRESS NOTES
"   Progress Note      Patient Name: Neda Núñez   Patient ID: [id number]   Sex: Female   YOB: 1955    Primary Care Provider: Jeff Art MD   Referring Provider: Jeff Art MD    Visit Date: March 22, 2021    Provider: Kadie Vu MD   Location: [facility]   Location Address: [address] [place] [address]  [id number]   Location Phone: [phone]          Chief Complaint  · Outpatient History & Physical / Surgical Orders      History Of Present Illness  [facility]  Outpatient History and Physical Surgical Orders  Preadmission Location: Phone Preadmission Time: 09:30 AM   Which Facility: [facility] Surgery Date: 04/06/2021 Preadmission Testing Date: 04/01/2021   Patient's Name: Neda Núñez YOB: 1955   Chief complaint/history present illness: Left Urothelial Cancer, Post Left Nephroureterectomy   Current Medication List: clotrimazole 1 % topical cream   Allergies: PENICILLINS and SULFA (SULFONAMIDES)   Significant past medical: Allergies, Lung cancer, Reflux, Shortness Of Air, Ureteral mass, and Urothelial cancer   Past Surgical History: Back Surgery, Lumbar, Cystoscopy and ureteroscopy with stent placement, Cystoscopy, with ureteral biopsy, Kidney, Leg Surgery, and Lung Surgery   Examination of heart and lungs: Regular rate, rhythm, no murmur, gallop, rub, Breath sounds normal, no distress, and Abdomen soft, non-tender, BSx4 are positive         Vitals  Date Time BP Position Site L\R Cuff Size HR RR TEMP (F) WT  HT  BMI kg/m2 BSA m2 O2 Sat FR L/min FiO2 HC       03/22/2021 12:30 /80 Sitting    76 - R   142lbs 6oz 5'  2\" 26.04 1.68                 Assessment  · Encounter for preoperative examination for general surgical procedure     V72.84/Z01.818  · Urothelial cancer     189.8/C68.9    Problems Reconciled  Plan  · Orders  o General Urology Surgery Order ([facility]) - V72.84/Z01.818, 189.8/C68.9 - 04/06/2021  o [facility] " Covid-19 Screening (95933) - V72.84/Z01.818, 189.8/C68.9 - 04/01/2021  · Medications  o Medications have been Reconciled  o Transition of Care or Provider Policy  · Instructions  o *****Surgical Orders******  o Pre-Operative Orders: Sign permit for Cystoscopy, Right Retrograde  o ****Patient Status****  o Outpatient   o ********************  o General Sedation  o IV Fluids: LR @ 100 cc/hour  o IV Antibiotics (on call to OR):  o Levaquin 500 mg IV OCTOR.  o RISK AND BENEFITS:  o Possible risks/complications, benefits and alternatives to surgical or invasive procedure have been explained to patient and/or legal guardian.            Electronically Signed by: Isabell Mcguire, -Author on March 22, 2021 01:21:02 PM  Electronically Co-signed by: Kadie Vu MD -Reviewer on March 22, 2021 04:53:06 PM

## 2021-05-14 NOTE — PROGRESS NOTES
Progress Note      Patient Name: Neda Núñez   Patient ID: [id number]   Sex: Female   YOB: 1955    Primary Care Provider: Jeff Art MD   Referring Provider: Jeff Art MD    Visit Date: March 22, 2021    Provider: Kadie Vu MD   [facility]   Location Address: [address] [place] [address]  [id number]   Location Phone: [phone]          Chief Complaint  · Patient here for urology concerns      History Of Present Illness  The patient returns for a routine cancer follow-up visit, a 65 year old /White female, who underwent left nephroureterectomy for a left renal pelvis urothelial carcinoma, high grade, in September 2020. The pathologic stage was T3, N0, M0.   There is no clinical evidence of recurrence of the cancer.   She states since the last visit, there has been symptoms of flank pain. Right-sided flank pain was first noted 2 weeks ago and is described as moderate.   Since surgery, follow-up diagnostic testing have not been done.      Her pathology revealed a high grade urothelial carcinoma with negative margins.  It was invasive into renal parenchyma.  No tumors were found in the ureter.      She had a cystogram one week out from surgery which was normal.  Her booker was removed at that time.    She had a cystoscopy in Dec 2020 which revealed no bladder tumors.    She is complaining of a round ring worm like spot on her neck on the left side.       Past Medical History  Allergies; Lung cancer; Reflux; Shortness Of Air; Ureteral mass; Urothelial cancer         Past Surgical History  Back Surgery, Lumbar; Cystoscopy and ureteroscopy with stent placement; Cystoscopy, with ureteral biopsy; Kidney; Leg Surgery; Lung Surgery         Allergy List  PENICILLINS; SULFA (SULFONAMIDES)         Family Medical History  Cancer, Unspecified; Diabetes, unspecified type; Migraine Headaches; Lung cancer; Family history of breast cancer         Social  "History  Alcohol (Light); Tobacco (Former)         Immunizations  Name Date Admin   Influenza 11/08/2019         Review of Systems  · Constitutional  o Denies  o : chills, fever      Vitals  Date Time BP Position Site L\R Cuff Size HR RR TEMP (F) WT  HT  BMI kg/m2 BSA m2 O2 Sat FR L/min FiO2 HC       03/22/2021 12:30 /80 Sitting    76 - R   142lbs 6oz 5'  2\" 26.04 1.68             Physical Examination     Ring worm patch on left side of her neck, just above clavical, no other spots seen           Results  · In-Office Procedures  o Lab procedure  § Automated dipstick urinalysis with microscopy (64462)   § Color Ur: Yellow   § Clarity Ur: Clear   § Glucose Ur Ql Strip: Negative   § Bilirub Ur Ql Strip: Negative   § Ketones Ur Ql Strip: Negative   § Sp Gr Ur Qn: 1.020   § Hgb Ur Ql Strip: Negative   § pH Ur-LsCnc: 7.0   § Prot Ur Ql Strip: Negative   § Urobilinogen Ur Strip-mCnc: 0.2   § Nitrite Ur Ql Strip: Negative   § WBC Est Ur Ql Strip: Negative   § RBC UrnS Qn HPF: 0   § WBC UrnS Qn HPF: 0   § Bacteria UrnS Qn HPF: 0   § Crystals UrnS Qn HPF: 0   § Epithelial Cells (non renal): 0 /HPF  § Epithelial Cells (renal): 0       Assessment  · Urothelial cancer     189.8/C68.9  · Urothelial carcinoma of kidney, left     189.0/C64.2  · Tinea of the body     110.5/B35.4    Problems Reconciled  Plan  · Medications  o clotrimazole 1 % topical cream   SIG: apply to the affected and surrounding areas of skin by topical route 2 times per day in the morning and evening   DISP: (1) Tube with 0 refills  Prescribed on 03/22/2021     o Medications have been Reconciled  o Transition of Care or Provider Policy  · Instructions  o DISCUSSION:  o The patient has done well after the treatment for urothelaial carcinoma. I have recommended continued scheduled follow-up. She will need a cysto and retrograde pyelogram. I will arrange that.   o I sent in clotrimazole for her ringworm.   o MDM: Includes management of 1 chronic condition " (urothelial carcinoma) and 1 acute problem (Tinea) and prescription drug management. [address].            Electronically Signed by: Kadie Vu MD -Author on March 22, 2021 01:04:46 PM

## 2021-05-14 NOTE — PROGRESS NOTES
Progress Note      Patient Name: Neda Núñez   Patient ID: [id number]   Sex: Female   YOB: 1955    Primary Care Provider: Jeff Art MD   Referring Provider: Jeff Art MD    Visit Date: April 14, 2021    Provider: Kadie Vu MD   [facility]   Location Address: [address] [place] [address]  [id number]   Location Phone: [phone]          Chief Complaint  · Patient here for urological concerns      History Of Present Illness  The patient returns for a routine cancer follow-up visit, a 65 year old /White female, who underwent left nephroureterectomy for a left renal pelvis urothelial carcinoma, high grade, in September 2020. The pathologic stage was T3, N0, M0.   There is no clinical evidence of recurrence of the cancer.   She states since the last visit, there has been symptoms of flank pain. Right-sided flank pain was first noted 2 weeks ago and is described as moderate.   Since surgery, follow-up diagnostic testing have not been done.      Her pathology revealed a high grade urothelial carcinoma with negative margins.  It was invasive into renal parenchyma.  No tumors were found in the ureter.      She had a cystogram one week out from surgery which was normal.  Her booker was removed at that time.    She had a cystoscopy in Dec 2020 which revealed no bladder tumors.    She had a cysto and right retrograde pyelogram in April 2021 that was normal.     She had a spot of tinnea on her neck which I treated but she lost her cream and it has come back again with worsening and she has a new spot on her left breast.            Past Medical History  Allergies; Lung cancer; Reflux; Shortness Of Air; Ureteral mass; Urothelial cancer         Past Surgical History  Back Surgery, Lumbar; Cystoscopy and ureteroscopy with stent placement; Cystoscopy, with ureteral biopsy; Kidney; Leg Surgery; Lung Surgery         Medication List  clotrimazole 1 % topical cream  "        Allergy List  PENICILLINS; SULFA (SULFONAMIDES)       Allergies Reconciled  Family Medical History  Cancer, Unspecified; Diabetes, unspecified type; Migraine Headaches; Lung cancer; Family history of breast cancer         Social History  Alcohol (Light); Tobacco (Former)         Immunizations  Name Date Admin   Influenza 11/08/2019         Review of Systems  · Constitutional  o Denies  o : fatigue, fever, chills, night sweats      Vitals  Date Time BP Position Site L\R Cuff Size HR RR TEMP (F) WT  HT  BMI kg/m2 BSA m2 O2 Sat FR L/min FiO2 HC       04/14/2021 10:09 /83 Sitting       141lbs 4oz 5'  2\" 25.83 1.67             Physical Examination  · Constitutional  o Appearance  o : well-nourished, well developed, alert, in no acute distress, normal body habitus          Results  · In-Office Procedures  o Lab procedure  § Automated dipstick urinalysis with microscopy (53750)   § Color Ur: Yellow   § Clarity Ur: Clear   § Glucose Ur Ql Strip: Negative   § Bilirub Ur Ql Strip: Negative   § Ketones Ur Ql Strip: Negative   § Sp Gr Ur Qn: 1.020   § Hgb Ur Ql Strip: Negative   § pH Ur-LsCnc: 5.0   § Prot Ur Ql Strip: Negative   § Urobilinogen Ur Strip-mCnc: 0.2   § Nitrite Ur Ql Strip: Negative   § WBC Est Ur Ql Strip: Negative   § RBC UrnS Qn HPF: 0   § WBC UrnS Qn HPF: 0   § Bacteria UrnS Qn HPF: 0   § Crystals UrnS Qn HPF: 0   § Epithelial Cells (non renal): 0 /HPF  § Epithelial Cells (renal): 0       Assessment  · Urothelial cancer     189.8/C68.9  · Urothelial carcinoma of kidney, left     189.0/C64.2  · Tinea of the body     110.5/B35.4    Problems Reconciled  Plan  · Medications  o clotrimazole 1 % topical cream   SIG: apply to the affected and surrounding areas of skin by topical route 2 times per day in the morning and evening   DISP: (1) Tube with 1 refills  Refilled on 04/14/2021     o Medications have been Reconciled  o Transition of Care or Provider Policy  · Instructions  o DISCUSSION:  o The " patient has done well after the treatment for urothelaial carcinoma. I have recommended continued scheduled follow-up. She will need a cysto in the office in 3 months. I will arrange that.   o FOLLOW-UP:  o In 3 months  o MDM: Includes management of 1 chronic condition (urothelial carcinoma) and prescription drug management of 1 acute problem (tinnea). [address]4429.            Electronically Signed by: Kadie Vu MD -Author on April 14, 2021 10:38:30 AM

## 2021-05-15 VITALS
HEIGHT: 62 IN | DIASTOLIC BLOOD PRESSURE: 74 MMHG | HEART RATE: 84 BPM | WEIGHT: 142.25 LBS | TEMPERATURE: 97 F | OXYGEN SATURATION: 98 % | RESPIRATION RATE: 16 BRPM | SYSTOLIC BLOOD PRESSURE: 123 MMHG | BODY MASS INDEX: 26.18 KG/M2

## 2021-05-15 VITALS
SYSTOLIC BLOOD PRESSURE: 142 MMHG | BODY MASS INDEX: 25.21 KG/M2 | HEART RATE: 74 BPM | DIASTOLIC BLOOD PRESSURE: 84 MMHG | HEIGHT: 62 IN | WEIGHT: 137 LBS

## 2021-05-15 VITALS — HEIGHT: 62 IN | BODY MASS INDEX: 25.21 KG/M2 | WEIGHT: 137 LBS | RESPIRATION RATE: 16 BRPM

## 2021-05-15 VITALS — HEIGHT: 62 IN | RESPIRATION RATE: 14 BRPM | BODY MASS INDEX: 24.66 KG/M2 | WEIGHT: 134 LBS

## 2021-05-15 VITALS
DIASTOLIC BLOOD PRESSURE: 74 MMHG | OXYGEN SATURATION: 98 % | WEIGHT: 138.44 LBS | HEART RATE: 80 BPM | SYSTOLIC BLOOD PRESSURE: 140 MMHG | TEMPERATURE: 96.4 F | BODY MASS INDEX: 25.48 KG/M2 | HEIGHT: 62 IN

## 2021-05-15 VITALS
SYSTOLIC BLOOD PRESSURE: 147 MMHG | WEIGHT: 139 LBS | HEIGHT: 62 IN | DIASTOLIC BLOOD PRESSURE: 73 MMHG | TEMPERATURE: 97.3 F | BODY MASS INDEX: 25.58 KG/M2 | HEART RATE: 72 BPM

## 2021-05-28 VITALS
SYSTOLIC BLOOD PRESSURE: 149 MMHG | OXYGEN SATURATION: 98 % | HEIGHT: 63 IN | RESPIRATION RATE: 20 BRPM | HEART RATE: 65 BPM | BODY MASS INDEX: 26.57 KG/M2 | TEMPERATURE: 98.3 F | DIASTOLIC BLOOD PRESSURE: 75 MMHG | RESPIRATION RATE: 18 BRPM | WEIGHT: 145.28 LBS | TEMPERATURE: 97.5 F | BODY MASS INDEX: 24.8 KG/M2 | OXYGEN SATURATION: 99 % | WEIGHT: 139.99 LBS | SYSTOLIC BLOOD PRESSURE: 139 MMHG | DIASTOLIC BLOOD PRESSURE: 81 MMHG | HEART RATE: 83 BPM

## 2021-05-28 NOTE — PROGRESS NOTES
Patient: BROOKS FARIAS     Acct: [id number]     Report: #[id number]  UNIT #: [id number]     : 1955    Encounter Date:2020  PRIMARY CARE: *LUCIANO RETANA  ***Signed***  --------------------------------------------------------------------------------------------------------------------  NURSE INTAKE      Visit Type      New Patient Visit            Chief Complaint      HISTORY OF LUNG CANCER /DYSPNEA            Referring Provider/Copies To      Primary Care Provider:  LUCIANO RETANA      Copies To:   LUCIANO RETANA            History and Present Illness      Past Oncology Illness History      Ms. Farias is a 64-year-old female referred here from her primary care provider,    NAHOMY Hall for history of lung cancer.            The patient was diagnosed with adenocarcinoma of the right lung in May 2011.      The mass was surgically resected on 2011 by right upper lobectomy.  The     tumor measured 8.2 x 6.1 x 6.1 cm.  Final staging revealed pT3N0, grade 2     adenocarcinoma.  Perineural invasion was identified.  There was also     visceropleural invasion.  The tumor was present at the bronchial margin.  3     lymph nodes were evaluated and found to be benign.  She underwent adjuvant     chemoradiation and received 4 cycles of carboplatin and Taxotere on days 1, 8,     and 15 of a q. 28-day cycle.  This treatment was given from 2011 to     2011 by Dr. Danielle.  Follow-up scans for several years showed no sign of     cancer recurrence.              Her most recent chest imaging was a CT scan on 2019.  This showed 1) there     are pulmonary densities in the superior segment of the right upper lobe as well     as the lingula and right middle lobe that could relate to areas of atelectasis     and scarring.  There is some subpleural blebs in the right apical area.  2) tiny    noncalcified pulmonary nodules in the lingular area.  3) no discrete abnormality    around the  region of the right clavicle where the patient is symptomatic.  4)     hepatic steatosis            The patient continues to be concerned about an area over the right clavicle near    the sternoclavicular joint.  This area was first noted more than a year ago but     has continued to swell slightly over time.  This was notable at the time of her     previous CT scan but is increased since then.  Patient states that the swelling     has been a steady increase.  At no time has the swelling ever receded.  She has     not noticed any redness or pain.  She has no other complaints.  She denies     weight loss, adenopathy, or fevers.            PAST, FAMILY   Past Medical History      Hematology/Oncology (F):  Lung Cancer            Past Surgical History      Lung Biopsy            RIGHT LUNG UPPER LOBE            Family History            BONE CANCER -COUSIN      Daughter has psoriasis            Social History      Marital Status:        Lives independently:  Yes      Number of Children:  2            Tobacco Use      Tobacco status:  Former smoker      Smoking packs/day:  2      Currently Vaping:  No      Smoking history:  25-50 pack years            Alcohol Use      Alcohol intake:  None            Substance Use      Substance use:  Denies use            REVIEW OF SYSTEMS      General:  Denies: Appetite Change, Fatigue, Fever, Night Sweats, Weight Gain,     Weight Loss      Eye:  Denies Blurred Vision, Denies Corrective Lenses, Denies Diplopia, Denies     Vision Changes      ENT:  Denies Headache, Denies Hearing Loss, Denies Hoarseness, Denies Sore     Throat      Cardiovascular:  Denies Chest Pain, Denies Palpitations      Respiratory:  Denies: Coughing Blood, Productive Cough, Shortness of Air, Whe    ezing      Gastrointestinal:  Denies Bloody Stools, Denies Constipation, Denies Diarrhea,     Denies Nausea/Vomiting, Denies Problem Swallowing, Denies Unable to Control     Bowels      Genitourinary:  Denies Blood  in Urine, Denies Incontinence, Denies Painful     Urination      Musculoskeletal:  Denies Back Pain, Denies Muscle Pain, Denies Painful Joints      Other      Abnormal area of swelling over the clavicle      Integumentary:  Denies Itching, Denies Lesions, Denies Rash      Neurologic:  Denies Dizziness, Denies Numbness\Tingling, Denies Seizures      Psychiatric:  Denies Anxiety, Denies Depression      Endocrine:  Denies Cold Intolerance, Denies Heat Intolerance      Hematologic/Lymphatic:  Denies Bruising, Denies Bleeding, Denies Enlarged Lymph     Nodes      Reproductive:  Denies: Menopause, Heavy Periods, Pregnant, Still Menstruating            VITAL SIGNS AND SCORES      Vitals      Height 5 ft 3.39 in / 161 cm      Weight 139 lbs 15.874 oz / 63.5 kg      BSA 1.67 m2      BMI 24.5 kg/m2      Temperature 97.5 F / 36.39 C - Temporal      Pulse 65      Respirations 18      Blood Pressure 149/75 Sitting, Right Arm      Pulse Oximetry 99%, ROOM AIR            Pain Score      Experiencing any pain?:  No      Pain Scale Used:  Numerical      Pain Intensity:  0            Fatigue Score      Experiencing any fatigue?:  No      Fatigue (0-10 scale):  0 (none)            EXAM      General: Alert, cooperative, no acute distress      Eyes: Anicteric sclera, PERRLA      HEENT: Oropharynx clear, no exudates      Respiratory: CTAB, normal respiratory effort      Abdomen: Normal active bowel sounds, no tenderness, no distention      Cardiovascular: RRR, no murmur, no peripheral edema      Skin: Normal tone, no rash, no lesions      Psychiatric: Appropriate affect, intact judgment      Neurologic: No focal sensory or motor deficits, no weakness, numbness, dizziness      Musculoskeletal: Normal muscle strength, normal muscle tone, there is a notable     area of swelling around the area where the right clavicle meets the sternum.      There is no associated mass in the area of swelling is soft.  There is no     erythema or  tenderness.  There is no associated adenopathy throughout the rest     of the neck.      Extremities: No clubbing, cyanosis, or deformities            PREVENTION      Hx Influenza Vaccination:  Yes      Date Influenza Vaccine Given:  Nov 1, 2019      Influenza Vaccine Declined:  No      2 or More Falls Past Year?:  No      Fall Past Year with Injury?:  No      Hx Pneumococcal Vaccination:  No      Encouraged to follow-up with:  PCP regarding preventative exams.      Chart initiated by      PRAFUL JARQUIN CMA            ALLERGY/MEDS      Allergies      Coded Allergies:             PENICILLINS (Verified  Allergy, Severe, THROAT SWELLS, 2/14/20)           SULFA (SULFONAMIDE ANTIBIOTICS) (Verified  Allergy, Unknown, HIVES ITCHING     , 2/14/20)            Medications            Oseltamivir Phosphate (Tamiflu*) 75 Mg Capsule      75 MG PO BID, #30 CAP         Prov: CHENTE JAEGER cfe         2/10/20       Wilbur-Fluticasone (Fluticasone 50 mcg) 16 Gm Spray.susp      1 PUFFS NARE EACH BID, #1 BOTTLE 0 Refills         Prov: CHENTE JAEGER cfe         2/10/20       Dm/Pse/Bpm 10-30-2 Mg/5ML (BROMFED DM COUGH SYRUP) 473 Ml Syrup      10 ML PO Q6H PRN for COUGH, #118 ML         Prov: CHENTE JAEGER cfe         2/10/20      Medications Reviewed:  No Changes made to meds            IMPRESSION/PLAN      Impression      64-year-old female with a history of adenocarcinoma of the lung diagnosed and     treated in 2011.            Diagnosis      History of lung cancer - Z85.118            Localized swelling, mass or lump of neck - R22.1            Notes      New Diagnostics      * Neck W/ Cont CT, SCHEDULED PROCEDURE         Dx: History of lung cancer - Z85.118      * Chest W/ Cont CT, SCHEDULED PROCEDURE         Dx: History of lung cancer - Z85.118            Plan      Adenocarcinoma of the lung: Patient was diagnosed and treated in 2011 with     surgery followed by chemoradiation.  Follow-up CT scans have not been concerning    for any  residual or recurrent disease.  At this point she could continue to be     monitored with low-dose CT scans.  However due to this concern for an unusual     area of swelling around the anterior chest to right clavicle I will repeat a CT     of the chest as well as CT neck for further evaluation.  If the CT scan is not     helpful in identifying the cause of swelling then I will refer the patient to an    ENT physician for evaluation.  She will return to clinic in 2 weeks following     the results of the CT scan.            Patient Education      Patient Education Provided:  Yes            Electronically signed by DAIN VALENTINO  02/17/2020 18:10       Disclaimer: Converted document may not contain table formatting or lab diagrams. Please see [employer] for the authenticated document.

## 2021-05-28 NOTE — PROGRESS NOTES
Patient: BROOKS FARIAS     Acct: [id number]     Report: #[id number]  UNIT #: [id number]     : 1955    Encounter Date:2020  PRIMARY CARE: *LUCIANO RETANA  ***Signed***  --------------------------------------------------------------------------------------------------------------------  NURSE INTAKE      Visit Type      Established Patient Visit            Chief Complaint      DYSPNEA HX OF LUNG CANCER            Referring Provider/Copies To      Primary Care Provider:  LUCIANO RETANA      Copies To:   LUCIANO RETANA            History and Present Illness      Past Oncology Illness History      Ms. Farias is a 64-year-old female referred here from her primary care provider,    NAHOMY Hall for history of lung cancer.            The patient was diagnosed with adenocarcinoma of the right lung in May 2011.      The mass was surgically resected on 2011 by right upper lobectomy.  The     tumor measured 8.2 x 6.1 x 6.1 cm.  Final staging revealed pT3N0, grade 2     adenocarcinoma.  Perineural invasion was identified.  There was also     visceropleural invasion.  The tumor was present at the bronchial margin.  3     lymph nodes were evaluated and found to be benign.  She underwent adjuvant     chemoradiation and received 4 cycles of carboplatin and Taxotere on days 1, 8,     and 15 of a q. 28-day cycle.  This treatment was given from 2011 to     2011 by Dr. Danielle.  Follow-up scans for several years showed no sign of     cancer recurrence.              Her most recent chest imaging was a CT scan on 2019.  This showed 1) there     are pulmonary densities in the superior segment of the right upper lobe as well     as the lingula and right middle lobe that could relate to areas of atelectasis     and scarring.  There is some subpleural blebs in the right apical area.  2) tiny    noncalcified pulmonary nodules in the lingular area.  3) no discrete abnormality    around  the region of the right clavicle where the patient is symptomatic.  4)     hepatic steatosis            The patient continues to be concerned about an area over the right clavicle near    the sternoclavicular joint.  This area was first noted more than a year ago but     has continued to swell slightly over time.  This was notable at the time of her     previous CT scan but is increased since then.  Patient states that the swelling     has been a steady increase.  At no time has the swelling ever receded.  She has     not noticed any redness or pain.  She has no other complaints.  She denies     weight loss, adenopathy, or fevers.            CT neck and chest with contrast on 2/20/2020: There is no significant     abnormality seen adjacent to the subcutaneous marker overlying the right base of    the neck in the medial right subclavicular region.  A normal-appearing right-    sided sternocleidomastoid muscle was seen.  There is a suspected tiny     intramuscular lipoma, measuring about 6 mm in this region.  This finding is of     doubtful clinical significance.  It was seen on prior chest CT from 2/24/2015     and 5/9/2019.  Is unchanged.  There is no convincing CT evidence of cellulitis.     No definite focal drainable fluid collection.  No supportive or necrotic     adenopathy.  No subcutaneous emphysema.  There is mild inflammatory change in     the right sternoclavicular joint suggesting arthritis.  There is no definite CT     finding to suggest osteomyelitis involving the sternum or right clavicle.      Otherwise no focal abnormalities identified.            HPI - Oncology Interim      Patient returns today to follow-up on her CT scan.  She continues to notice that    the right side of the lower neck near the clavicle continues to enlarge or swe    ll.  She does report a dull pain sensation that radiates toward the back.  Her     biggest concern was recurrence of her cancer so she is very happy to find out      that her CT scan is negative for any sign of malignancy.  She reports no new     symptoms concerning for systemic disease such as weight loss, fever, fatigue,     adenopathy.            PAST, FAMILY   Past Medical History      Hematology/Oncology (F):  Lung Cancer            Past Surgical History      Lung Biopsy            RIGHT LUNG UPPER LOBE            Family History            BONE CANCER -COUSIN      Daughter has psoriasis            Social History      Marital Status:        Lives independently:  Yes      Number of Children:  2            Tobacco Use      Tobacco status:  Former smoker      Smoking packs/day:  2      Currently Vaping:  No      Smoking history:  25-50 pack years            Alcohol Use      Alcohol intake:  None            Substance Use      Substance use:  Denies use            REVIEW OF SYSTEMS      General:  Denies: Appetite Change, Fatigue, Fever, Night Sweats, Weight Gain,     Weight Loss      Eye:  Denies Blurred Vision, Denies Corrective Lenses, Denies Diplopia, Denies     Vision Changes      ENT:  Denies Headache, Denies Hearing Loss, Denies Hoarseness, Denies Sore     Throat      Cardiovascular:  Denies Chest Pain, Denies Palpitations      Respiratory:  Denies: Coughing Blood, Productive Cough, Shortness of Air,     Wheezing      Gastrointestinal:  Denies Bloody Stools, Denies Constipation, Denies Diarrhea,     Denies Nausea/Vomiting, Denies Problem Swallowing, Denies Unable to Control     Bowels      Genitourinary:  Denies Blood in Urine, Denies Incontinence, Denies Painful     Urination      Musculoskeletal:  Denies Back Pain, Denies Muscle Pain, Denies Painful Joints      Integumentary:  Denies Itching, Denies Lesions, Denies Rash      Other      Swelling area in the lower right neck      Neurologic:  Denies Dizziness, Denies Numbness\Tingling, Denies Seizures      Psychiatric:  Denies Anxiety, Denies Depression      Endocrine:  Denies Cold Intolerance, Denies Heat  Intolerance      Hematologic/Lymphatic:  Denies Bruising, Denies Bleeding, Denies Enlarged Lymph     Nodes      Reproductive:  Denies: Menopause, Heavy Periods, Pregnant, Still Menstruating            VITAL SIGNS AND SCORES      Vitals      Weight 145 lbs 4.530 oz / 65.9 kg      Temperature 98.3 F / 36.83 C - Temporal      Pulse 83      Respirations 20      Blood Pressure 139/81 Sitting, Right Arm      Pulse Oximetry 98%, ROOM AIR            Pain Score      Experiencing any pain?:  No      Pain Scale Used:  Numerical      Pain Intensity:  0            Fatigue Score      Experiencing any fatigue?:  No      Fatigue (0-10 scale):  0 (none)            EXAM      General: Alert, cooperative, no acute distress      Eyes: Anicteric sclera, PERRLA      HEENT: Oropharynx clear, no exudates, there is an appreciable increase in size     of the area of concern in the lower right neck around her clavicle, there is no     erythema, no tenderness, no mass.      Respiratory: CTAB, normal respiratory effort      Abdomen: Normal active bowel sounds, no tenderness, no distention      Cardiovascular: RRR, no murmur, no peripheral edema      Skin: Normal tone, no rash, no lesions      Psychiatric: Appropriate affect, intact judgment      Neurologic: No focal sensory or motor deficits, no weakness, numbness, dizziness      Musculoskeletal: Normal muscle strength, normal muscle tone      Extremities: No clubbing, cyanosis, or deformities            PREVENTION      Hx Influenza Vaccination:  Yes      Date Influenza Vaccine Given:  Nov 1, 2019      Influenza Vaccine Declined:  No      2 or More Falls Past Year?:  No      Fall Past Year with Injury?:  No      Hx Pneumococcal Vaccination:  No      Encouraged to follow-up with:  PCP regarding preventative exams.      Chart initiated by      PRAFUL JARQUIN CMA            ALLERGY/MEDS      Allergies      Coded Allergies:             PENICILLINS (Verified  Allergy, Severe, THROAT SWELLS, 3/5/20)            SULFA (SULFONAMIDE ANTIBIOTICS) (Verified  Allergy, Unknown, HIVES ITCHING     , 3/5/20)            Medications      Last Reconciled on 3/5/20 17:45 by DAIN VALENTINO      No Active Prescriptions or Reported Meds            Medications Reviewed:  Changes made to meds            IMPRESSION/PLAN      Impression      64-year-old female with a history of adenocarcinoma of the right lung diagnosed     and treated in 2011.            Diagnosis      Mass in neck - R22.1            Notes      Discontinued Medications      * DM/PSE/BPM 10-30-2 MG/5ML (BROMFED DM COUGH SYRUP) 473 ML SYRUP: 10 ML PO Q6H       PRN COUGH #118      * Wilbur-Fluticasone (Fluticasone 50 mcg) 16 GM SPRAY.SUSP: 1 PUFFS NARE EACH BID       #1      * Oseltamivir Phosphate (Tamiflu*) 75 MG CAPSULE: 75 MG PO BID #30      New Referrals      * Ear, Nose, Throat, As Soon As Possible         MAUL[facility] Deal with [facility]         Dx: Mass in neck - R22.1            Plan      Adenocarcinoma the right lung: Patient was diagnosed and treated in May 2011.      Recent CT scan of the neck and chest shows no concern for recurrent disease.      She needs no further imaging and less new concerns arise.  I will follow-up with    her in 1 year unless she or her primary care provider feel there is a need for     her to have sooner evaluation.            Neck swelling: There is no evidence of metastatic disease or other abnormality     on the recent CT of the neck and chest.  There is however clinical evidence that    that area of concern is enlarging.  On CT she was found to have a small lipoma     measuring 6 mm in this region but this is stable over many years.  I am not sure    if this is of any clinical significance.  I will refer her to ENT for further     evaluation.  They may want to get an MRI of the neck but I will defer that     decision to the ENT doctor.            Patient Education      Patient Education Provided:  Yes            Electronically signed by  DAIN VALENTINO  03/05/2020 17:45       Disclaimer: Converted document may not contain table formatting or lab diagrams. Please see [employer] for the authenticated document.

## 2021-06-30 ENCOUNTER — LAB (OUTPATIENT)
Dept: LAB | Facility: HOSPITAL | Age: 66
End: 2021-06-30

## 2021-06-30 ENCOUNTER — TELEPHONE (OUTPATIENT)
Dept: UROLOGY | Facility: CLINIC | Age: 66
End: 2021-06-30

## 2021-06-30 DIAGNOSIS — R31.0 GROSS HEMATURIA: ICD-10-CM

## 2021-06-30 DIAGNOSIS — N30.01 ACUTE CYSTITIS WITH HEMATURIA: ICD-10-CM

## 2021-06-30 DIAGNOSIS — C68.9 UROTHELIAL CANCER (HCC): Primary | ICD-10-CM

## 2021-06-30 PROCEDURE — 87088 URINE BACTERIA CULTURE: CPT | Performed by: UROLOGY

## 2021-06-30 PROCEDURE — 87186 SC STD MICRODIL/AGAR DIL: CPT | Performed by: UROLOGY

## 2021-06-30 PROCEDURE — 87086 URINE CULTURE/COLONY COUNT: CPT | Performed by: UROLOGY

## 2021-07-02 ENCOUNTER — TELEPHONE (OUTPATIENT)
Dept: UROLOGY | Facility: CLINIC | Age: 66
End: 2021-07-02

## 2021-07-02 DIAGNOSIS — R31.0 GROSS HEMATURIA: Primary | ICD-10-CM

## 2021-07-02 DIAGNOSIS — N30.01 ACUTE CYSTITIS WITH HEMATURIA: ICD-10-CM

## 2021-07-02 LAB — BACTERIA SPEC AEROBE CULT: ABNORMAL

## 2021-07-02 RX ORDER — SULFAMETHOXAZOLE AND TRIMETHOPRIM 800; 160 MG/1; MG/1
1 TABLET ORAL 2 TIMES DAILY
Qty: 20 TABLET | Refills: 0 | Status: SHIPPED | OUTPATIENT
Start: 2021-07-02 | End: 2021-07-12

## 2021-07-02 RX ORDER — LEVOFLOXACIN 500 MG/1
500 TABLET, FILM COATED ORAL DAILY
Qty: 7 TABLET | Refills: 0 | Status: SHIPPED | OUTPATIENT
Start: 2021-07-02 | End: 2021-07-09

## 2021-07-02 RX ORDER — SULFAMETHOXAZOLE AND TRIMETHOPRIM 800; 160 MG/1; MG/1
1 TABLET ORAL 2 TIMES DAILY
Qty: 20 TABLET | Refills: 0 | Status: CANCELLED | OUTPATIENT
Start: 2021-07-02 | End: 2021-07-12

## 2021-07-02 NOTE — TELEPHONE ENCOUNTER
Clive Pharmacist at [facility] called and said patient is allergic to Sulfa and penicillin.

## 2021-07-20 RX ORDER — FEXOFENADINE HCL 180 MG/1
TABLET ORAL
COMMUNITY
End: 2021-10-25

## 2021-07-20 RX ORDER — CLOTRIMAZOLE 1 %
CREAM (GRAM) TOPICAL
COMMUNITY
Start: 2021-04-19 | End: 2021-10-25

## 2021-07-21 ENCOUNTER — OFFICE VISIT (OUTPATIENT)
Dept: UROLOGY | Facility: CLINIC | Age: 66
End: 2021-07-21

## 2021-07-21 VITALS
DIASTOLIC BLOOD PRESSURE: 68 MMHG | HEIGHT: 62 IN | WEIGHT: 140.8 LBS | SYSTOLIC BLOOD PRESSURE: 127 MMHG | BODY MASS INDEX: 25.91 KG/M2

## 2021-07-21 DIAGNOSIS — R31.0 GROSS HEMATURIA: Primary | ICD-10-CM

## 2021-07-21 DIAGNOSIS — C68.9 UROTHELIAL CANCER (HCC): ICD-10-CM

## 2021-07-21 LAB
BILIRUB BLD-MCNC: NEGATIVE MG/DL
CLARITY, POC: CLEAR
COLOR UR: YELLOW
GLUCOSE UR STRIP-MCNC: NEGATIVE MG/DL
KETONES UR QL: NEGATIVE
LEUKOCYTE EST, POC: NEGATIVE
NITRITE UR-MCNC: NEGATIVE MG/ML
PH UR: 5.5 [PH] (ref 5–8)
PROT UR STRIP-MCNC: NEGATIVE MG/DL
RBC # UR STRIP: NEGATIVE /UL
SP GR UR: 1.01 (ref 1–1.03)
UROBILINOGEN UR QL: NORMAL

## 2021-07-21 PROCEDURE — 81003 URINALYSIS AUTO W/O SCOPE: CPT | Performed by: UROLOGY

## 2021-07-21 PROCEDURE — 52000 CYSTOURETHROSCOPY: CPT | Performed by: UROLOGY

## 2021-07-21 NOTE — PROGRESS NOTES
Cystoscopy    Date/Time: 7/21/2021 1:44 PM  Performed by: Kadie Vu MD  Authorized by: Kadie Vu MD   Comments: Cytoscopy Procedure:     Procedure: Flexible cytoscope was passed per urethra into the bladder without difficulty after proper consent. The bladder was inspected in a systematic meridian fashion. There were no tumors, lesions, stones, or other abnormalities noted within the bladder. Of note, there was no increased vascularity as well. Both ureteral orifices were identified and were normal in appearance. The flexible cytoscope was removed. The patient tolerated the procedure well.         The patient returns for a routine cancer follow-up visit, a 65 year old /White female, who underwent left nephroureterectomy for a left renal pelvis urothelial carcinoma, high grade, in September 2020. The pathologic stage was T3, N0, M0.   There is no clinical evidence of recurrence of the cancer.   She states since the last visit, there has been symptoms of flank pain. Right-sided flank pain was first noted 2 weeks ago and is described as moderate.   Since surgery, follow-up diagnostic testing have not been done.       Her pathology revealed a high grade urothelial carcinoma with negative margins.  It was invasive into renal parenchyma.  No tumors were found in the ureter.      She had a cystogram one week out from surgery which was normal.  Her booker was removed at that time.    She had a cystoscopy in Dec 2020 which revealed no bladder tumors.    She had a cysto and right retrograde pyelogram in April 2021 that was normal.

## 2021-10-18 ENCOUNTER — OFFICE VISIT (OUTPATIENT)
Dept: UROLOGY | Facility: CLINIC | Age: 66
End: 2021-10-18

## 2021-10-18 ENCOUNTER — PREP FOR SURGERY (OUTPATIENT)
Dept: OTHER | Facility: HOSPITAL | Age: 66
End: 2021-10-18

## 2021-10-18 VITALS
HEIGHT: 62 IN | BODY MASS INDEX: 25.62 KG/M2 | DIASTOLIC BLOOD PRESSURE: 76 MMHG | SYSTOLIC BLOOD PRESSURE: 121 MMHG | WEIGHT: 139.2 LBS

## 2021-10-18 DIAGNOSIS — C68.9 UROTHELIAL CANCER (HCC): Primary | ICD-10-CM

## 2021-10-18 LAB
BILIRUB BLD-MCNC: ABNORMAL MG/DL
CLARITY, POC: CLEAR
COLOR UR: YELLOW
EXPIRATION DATE: ABNORMAL
GLUCOSE UR STRIP-MCNC: NEGATIVE MG/DL
KETONES UR QL: ABNORMAL
LEUKOCYTE EST, POC: NEGATIVE
Lab: ABNORMAL
NITRITE UR-MCNC: NEGATIVE MG/ML
PH UR: 5.5 [PH] (ref 5–8)
PROT UR STRIP-MCNC: ABNORMAL MG/DL
RBC # UR STRIP: NEGATIVE /UL
SP GR UR: 1.03 (ref 1–1.03)
UROBILINOGEN UR QL: NORMAL

## 2021-10-18 PROCEDURE — 81003 URINALYSIS AUTO W/O SCOPE: CPT | Performed by: UROLOGY

## 2021-10-18 PROCEDURE — 52000 CYSTOURETHROSCOPY: CPT | Performed by: UROLOGY

## 2021-10-18 RX ORDER — SODIUM CHLORIDE 0.9 % (FLUSH) 0.9 %
10 SYRINGE (ML) INJECTION EVERY 12 HOURS SCHEDULED
Status: CANCELLED | OUTPATIENT
Start: 2021-10-18

## 2021-10-18 RX ORDER — SODIUM CHLORIDE 9 MG/ML
100 INJECTION, SOLUTION INTRAVENOUS CONTINUOUS
Status: CANCELLED | OUTPATIENT
Start: 2021-10-18

## 2021-10-18 RX ORDER — CEPHALEXIN 500 MG/1
CAPSULE ORAL
COMMUNITY
Start: 2021-10-15 | End: 2021-10-27

## 2021-10-18 RX ORDER — LEVOFLOXACIN 5 MG/ML
500 INJECTION, SOLUTION INTRAVENOUS ONCE
Status: CANCELLED | OUTPATIENT
Start: 2021-10-18 | End: 2021-10-18

## 2021-10-18 RX ORDER — SODIUM CHLORIDE 0.9 % (FLUSH) 0.9 %
10 SYRINGE (ML) INJECTION AS NEEDED
Status: CANCELLED | OUTPATIENT
Start: 2021-10-18

## 2021-10-18 NOTE — H&P
[facility]   Urology HISTORY AND PHYSICAL    Patient Name: Neda Núñez  : 1955  MRN: [id number]  Primary Care Physician:  Provider, No Known  Date of admission: (Not on file)    Subjective   Subjective     Chief Complaint: History of left ureteral pelvis cancer    Patient has a new papillary tumor at her bladder neck as well as history of left nephro ureterectomy.  She presents for TURBT and right retrograde pyelogram      Personal History     Past Medical History:   Diagnosis Date   • Allergies    • Condition not found     Shortness of Air   • Lung cancer (HCC)    • Reflux esophagitis    • Ureteral mass    • Urothelial cancer (HCC) 2020    (Left)       Past Surgical History:   Procedure Laterality Date   • BACK SURGERY      LUMBAR   • CYSTOSCOPY URETERAL BIOPSY     • CYSTOSCOPY, URETEROSCOPY, RETROGRADE PYELOGRAM, STENT INSERTION     • KIDNEY SURGERY     • LEG SURGERY     • LUNG SURGERY         Family History: family history includes Breast cancer in her paternal grandmother; Cancer in an other family member; Diabetes in her brother and maternal grandfather; Lung cancer in an other family member; Migraines in an other family member. Otherwise pertinent FHx was reviewed and not pertinent to current issue.    Social History:  reports that she has quit smoking. She smoked 2.00 packs per day. She has never used smokeless tobacco. She reports current alcohol use.    Home Medications:  cephalexin, clotrimazole, and fexofenadine    Allergies:  Allergies   Allergen Reactions   • Penicillins Unknown - High Severity   • Sulfa Antibiotics Unknown - High Severity       Objective    Objective     Vitals:   BP: (121)/(76) 121/76    Physical Exam  Constitutional:       Appearance: Normal appearance.   Cardiovascular:      Rate and Rhythm: Normal rate and regular rhythm.   Pulmonary:      Effort: Pulmonary effort is normal.      Breath sounds: Normal breath sounds.   Neurological:      Mental Status: She is  alert. Mental status is at baseline.   Psychiatric:         Mood and Affect: Mood and affect normal.         Speech: Speech normal.         Judgment: Judgment normal.         Result Review    Result Review:  I have personally reviewed the results from the time of this admission to 10/18/2021 14:50 EDT and agree with these findings:  [x]  Laboratory  [x]  Microbiology  [x]  Radiology  []  EKG/Telemetry   []  Cardiology/Vascular   [x]  Pathology  [x]  Old records  []  Other:      Assessment/Plan   Assessment / Plan       Active Hospital Problems:  There are no active hospital problems to display for this patient.      Plan: Transurethral resection of a bladder tumor, right retrograde pyelogram.  Risks and benefits discussed with patient and they are agreeable to proceed.    DVT prophylaxis:  No DVT prophylaxis order currently exists.    CODE STATUS:           Electronically signed by Kadie Vu MD, 10/18/21, 2:50 PM EDT.

## 2021-10-18 NOTE — PROGRESS NOTES
Cystoscopy    Date/Time: 10/18/2021 2:31 PM  Performed by: Kadie Vu MD  Authorized by: Kadie uV MD   Preparation: Patient was prepped and draped in the usual sterile fashion.  Local anesthesia used: no    Anesthesia:  Local anesthesia used: no    Sedation:  Patient sedated: no    Patient tolerance: patient tolerated the procedure well with no immediate complications  Comments: The patient returns for a routine cancer follow-up visit, a 65 year old /White female, who underwent left nephroureterectomy for a left renal pelvis urothelial carcinoma, high grade, in September 2020. The pathologic stage was T3, N0, M0.     There is no clinical evidence of recurrence of the cancer.     She states since the last visit, there has been symptoms of flank pain. Right-sided flank pain was first noted 2 weeks ago and is described as moderate.     Since surgery, follow-up diagnostic testing have not been done.      Her pathology revealed a high grade urothelial carcinoma with negative margins.  It was invasive into renal parenchyma.  No tumors were found in the ureter.      She had a cystogram one week out from surgery which was normal.  Her booker was removed at that time.    She had a cystoscopy in Dec 2020 which revealed no bladder tumors.    She had a cysto and right retrograde pyelogram in April 2021 that was normal.          Cytoscopy Procedure:     Procedure: Flexible cytoscope was passed per urethra into the bladder without difficulty after proper consent. The bladder was inspected in a systematic meridian fashion. There were no tumors, lesions, stones, or other abnormalities noted within the bladder except for a papillary appearing tumor at the bladder neck.  This was near the left ureteral orifice.  Patient is status post left nephro ureterectomy.  Of note, there was no increased vascularity as well. The flexible cytoscope was removed. The patient tolerated the procedure well.

## 2021-10-22 ENCOUNTER — OFFICE VISIT (OUTPATIENT)
Dept: FAMILY MEDICINE CLINIC | Facility: CLINIC | Age: 66
End: 2021-10-22

## 2021-10-22 VITALS
DIASTOLIC BLOOD PRESSURE: 90 MMHG | WEIGHT: 143.5 LBS | SYSTOLIC BLOOD PRESSURE: 142 MMHG | HEART RATE: 95 BPM | BODY MASS INDEX: 26.41 KG/M2 | HEIGHT: 62 IN | RESPIRATION RATE: 18 BRPM | TEMPERATURE: 97.6 F | OXYGEN SATURATION: 97 %

## 2021-10-22 DIAGNOSIS — C68.9 UROTHELIAL CANCER (HCC): ICD-10-CM

## 2021-10-22 DIAGNOSIS — M89.8X9 BONE MASS: ICD-10-CM

## 2021-10-22 DIAGNOSIS — Z87.891 FORMER SMOKER: ICD-10-CM

## 2021-10-22 DIAGNOSIS — Z12.31 ENCOUNTER FOR SCREENING MAMMOGRAM FOR MALIGNANT NEOPLASM OF BREAST: ICD-10-CM

## 2021-10-22 DIAGNOSIS — Z85.118 HISTORY OF LUNG CANCER: ICD-10-CM

## 2021-10-22 DIAGNOSIS — Z13.1 SCREENING FOR DIABETES MELLITUS: ICD-10-CM

## 2021-10-22 DIAGNOSIS — Z00.00 ANNUAL PHYSICAL EXAM: Primary | ICD-10-CM

## 2021-10-22 DIAGNOSIS — Z78.0 POSTMENOPAUSE: ICD-10-CM

## 2021-10-22 DIAGNOSIS — Z13.29 SCREENING FOR THYROID DISORDER: ICD-10-CM

## 2021-10-22 DIAGNOSIS — H61.21 IMPACTED CERUMEN OF RIGHT EAR: ICD-10-CM

## 2021-10-22 DIAGNOSIS — Z12.11 ENCOUNTER FOR SCREENING FOR MALIGNANT NEOPLASM OF COLON: ICD-10-CM

## 2021-10-22 DIAGNOSIS — Z13.220 SCREENING FOR LIPID DISORDERS: ICD-10-CM

## 2021-10-22 DIAGNOSIS — Z12.2 SCREENING FOR LUNG CANCER: ICD-10-CM

## 2021-10-22 PROCEDURE — 99397 PER PM REEVAL EST PAT 65+ YR: CPT | Performed by: STUDENT IN AN ORGANIZED HEALTH CARE EDUCATION/TRAINING PROGRAM

## 2021-10-22 NOTE — PROGRESS NOTES
"Chief Complaint  Multiple concerns including possible breast mass, right collarbone mass, annual physical    Subjective         Neda Núñez is a 66 y.o. female who presents to [facility]    66 years old female comes to the clinic today to follow-up on chronic conditions and annual physical exam.    Patient does have a history of lung cancer and urothelial cancer.  Following up with urology and heme-onc.    Patient reports possible left breast lump and would like further interventions.  Denies any breast discharge or skin color changes.  Patient does have a history of enlarged right collarbone enlargement since many years and would like to find out more about that growth.    Patient denies any other acute complaint at this time.  Review of Systems   Objective   Vital Signs:   Vitals:    10/22/21 1611   BP: 142/90   Pulse: 95   Resp: 18   Temp: 97.6 °F (36.4 °C)   SpO2: 97%   Weight: 65.1 kg (143 lb 8 oz)   Height: 157.5 cm (62\")      Body mass index is 26.25 kg/m².   Physical Exam  Vitals reviewed. Exam conducted with a chaperone present.   Constitutional:       Appearance: Normal appearance. She is well-developed.   HENT:      Head: Normocephalic and atraumatic.      Comments: Right ear wax; no visualized tympanic membrane, left ear shows no wax     Right Ear: External ear normal.      Left Ear: External ear normal.      Mouth/Throat:      Pharynx: No oropharyngeal exudate.   Eyes:      Conjunctiva/sclera: Conjunctivae normal.      Pupils: Pupils are equal, round, and reactive to light.   Neck:        Comments: Small fixed bony mass without any erythema or tenderness  Cardiovascular:      Rate and Rhythm: Normal rate and regular rhythm.      Heart sounds: No murmur heard.  No friction rub. No gallop.    Pulmonary:      Effort: Pulmonary effort is normal.      Breath sounds: Normal breath sounds. No wheezing or rhonchi.   Abdominal:      General: Bowel sounds are normal. There is " no distension.      Palpations: Abdomen is soft.      Tenderness: There is no abdominal tenderness.   Genitourinary:     Comments: Breast exam bilaterally; no nodule or lymphadenopathy  Skin:     General: Skin is warm and dry.   Neurological:      Mental Status: She is alert and oriented to person, place, and time.      Cranial Nerves: No cranial nerve deficit.   Psychiatric:         Mood and Affect: Mood and affect normal.         Behavior: Behavior normal.         Thought Content: Thought content normal.         Judgment: Judgment normal.                   Assessment and Plan   Diagnoses and all orders for this visit:    1. Annual physical exam (Primary)  -     TSH Rfx On Abnormal To Free T4; Future  -     CBC & Differential; Future  -     Comprehensive Metabolic Panel; Future  -     Hemoglobin A1c; Future  -     Lipid Panel; Future    2. Encounter for screening mammogram for malignant neoplasm of breast  -     Mammo Screening Bilateral With CAD; Future  -     TSH Rfx On Abnormal To Free T4; Future  -     CBC & Differential; Future  -     Comprehensive Metabolic Panel; Future  -     Hemoglobin A1c; Future  -     Lipid Panel; Future    3. History of lung cancer  Comments:  Asymptomatic, s/p surgical intervention in the past  Orders:  -      CT Chest Low Dose Cancer Screening WO; Future  -     TSH Rfx On Abnormal To Free T4; Future  -     CBC & Differential; Future  -     Comprehensive Metabolic Panel; Future  -     Hemoglobin A1c; Future  -     Lipid Panel; Future    4. Urothelial cancer (HCC)  Comments:  c/w urology   Orders:  -     TSH Rfx On Abnormal To Free T4; Future  -     CBC & Differential; Future  -     Comprehensive Metabolic Panel; Future  -     Hemoglobin A1c; Future  -     Lipid Panel; Future    5. Bone mass, right proximal collar  Comments:  Chronic, no enlargement or pain but patient would like to find out more  Orders:  -     CT Soft Tissue Neck Without Contrast; Future  -     TSH Rfx On Abnormal To  Free T4; Future  -     CBC & Differential; Future  -     Comprehensive Metabolic Panel; Future  -     Hemoglobin A1c; Future  -     Lipid Panel; Future    6. Former smoker  -      CT Chest Low Dose Cancer Screening WO; Future  -     TSH Rfx On Abnormal To Free T4; Future  -     CBC & Differential; Future  -     Comprehensive Metabolic Panel; Future  -     Hemoglobin A1c; Future  -     Lipid Panel; Future    7. Screening for lung cancer  -      CT Chest Low Dose Cancer Screening WO; Future  -     TSH Rfx On Abnormal To Free T4; Future  -     CBC & Differential; Future  -     Comprehensive Metabolic Panel; Future  -     Hemoglobin A1c; Future  -     Lipid Panel; Future    8. Postmenopause  -     DEXA Bone Density Axial; Future  -     TSH Rfx On Abnormal To Free T4; Future  -     CBC & Differential; Future  -     Comprehensive Metabolic Panel; Future  -     Hemoglobin A1c; Future  -     Lipid Panel; Future    9. Impacted cerumen of right ear  -     carbamide peroxide (Debrox) 6.5 % otic solution; Administer 5 drops to the right ear 2 (Two) Times a Day.  Dispense: 1 each; Refill: 1  -     TSH Rfx On Abnormal To Free T4; Future  -     CBC & Differential; Future  -     Comprehensive Metabolic Panel; Future  -     Hemoglobin A1c; Future  -     Lipid Panel; Future    10. Screening for diabetes mellitus  -     TSH Rfx On Abnormal To Free T4; Future  -     CBC & Differential; Future  -     Comprehensive Metabolic Panel; Future  -     Hemoglobin A1c; Future  -     Lipid Panel; Future    11. Screening for thyroid disorder  -     TSH Rfx On Abnormal To Free T4; Future  -     CBC & Differential; Future  -     Comprehensive Metabolic Panel; Future  -     Hemoglobin A1c; Future  -     Lipid Panel; Future    12. Screening for lipid disorders  -     Lipid Panel; Future    13. Encounter for screening for malignant neoplasm of colon    Other orders  -     SCANNED - COLONOSCOPY            Follow Up   Return in about 3 months (around  1/22/2022) for Recheck.  Patient was given instructions and counseling regarding her condition or for health maintenance advice. Please see specific information pulled into the AVS if appropriate.

## 2021-10-25 ENCOUNTER — OFFICE VISIT (OUTPATIENT)
Dept: ONCOLOGY | Facility: HOSPITAL | Age: 66
End: 2021-10-25

## 2021-10-25 VITALS
SYSTOLIC BLOOD PRESSURE: 145 MMHG | BODY MASS INDEX: 26.01 KG/M2 | RESPIRATION RATE: 18 BRPM | DIASTOLIC BLOOD PRESSURE: 82 MMHG | HEART RATE: 73 BPM | WEIGHT: 142.2 LBS | TEMPERATURE: 96.6 F | OXYGEN SATURATION: 97 %

## 2021-10-25 DIAGNOSIS — N63.0 BREAST LUMP: ICD-10-CM

## 2021-10-25 DIAGNOSIS — C68.9 UROTHELIAL CANCER (HCC): Primary | ICD-10-CM

## 2021-10-25 DIAGNOSIS — C34.11 MALIGNANT NEOPLASM OF UPPER LOBE OF RIGHT LUNG (HCC): ICD-10-CM

## 2021-10-25 PROBLEM — N28.89 URETERAL MASS: Status: ACTIVE | Noted: 2021-10-25

## 2021-10-25 PROBLEM — K21.9 ESOPHAGEAL REFLUX: Status: ACTIVE | Noted: 2021-10-25

## 2021-10-25 PROBLEM — J01.80 OTHER ACUTE SINUSITIS: Status: ACTIVE | Noted: 2021-10-25

## 2021-10-25 PROBLEM — C34.90 LUNG CANCER: Status: ACTIVE | Noted: 2021-10-25

## 2021-10-25 PROCEDURE — G0463 HOSPITAL OUTPT CLINIC VISIT: HCPCS | Performed by: INTERNAL MEDICINE

## 2021-10-25 PROCEDURE — 99205 OFFICE O/P NEW HI 60 MIN: CPT | Performed by: INTERNAL MEDICINE

## 2021-10-26 ENCOUNTER — TRANSCRIBE ORDERS (OUTPATIENT)
Dept: ADMINISTRATIVE | Facility: HOSPITAL | Age: 66
End: 2021-10-26

## 2021-10-26 DIAGNOSIS — Z12.31 BREAST CANCER SCREENING BY MAMMOGRAM: Primary | ICD-10-CM

## 2021-10-28 ENCOUNTER — LAB (OUTPATIENT)
Dept: LAB | Facility: HOSPITAL | Age: 66
End: 2021-10-28

## 2021-10-28 DIAGNOSIS — Z78.0 POSTMENOPAUSE: ICD-10-CM

## 2021-10-28 DIAGNOSIS — Z13.29 SCREENING FOR THYROID DISORDER: ICD-10-CM

## 2021-10-28 DIAGNOSIS — C68.9 UROTHELIAL CANCER (HCC): ICD-10-CM

## 2021-10-28 DIAGNOSIS — Z12.2 SCREENING FOR LUNG CANCER: ICD-10-CM

## 2021-10-28 DIAGNOSIS — Z00.00 ANNUAL PHYSICAL EXAM: ICD-10-CM

## 2021-10-28 DIAGNOSIS — Z13.220 SCREENING FOR LIPID DISORDERS: ICD-10-CM

## 2021-10-28 DIAGNOSIS — Z85.118 HISTORY OF LUNG CANCER: ICD-10-CM

## 2021-10-28 DIAGNOSIS — Z87.891 FORMER SMOKER: ICD-10-CM

## 2021-10-28 DIAGNOSIS — H61.21 IMPACTED CERUMEN OF RIGHT EAR: ICD-10-CM

## 2021-10-28 DIAGNOSIS — Z13.1 SCREENING FOR DIABETES MELLITUS: ICD-10-CM

## 2021-10-28 DIAGNOSIS — M89.8X9 BONE MASS: ICD-10-CM

## 2021-10-28 DIAGNOSIS — Z12.31 ENCOUNTER FOR SCREENING MAMMOGRAM FOR MALIGNANT NEOPLASM OF BREAST: ICD-10-CM

## 2021-10-28 LAB
ALBUMIN SERPL-MCNC: 4.4 G/DL (ref 3.5–5.2)
ALBUMIN/GLOB SERPL: 1.7 G/DL
ALP SERPL-CCNC: 46 U/L (ref 39–117)
ALT SERPL W P-5'-P-CCNC: 27 U/L (ref 1–33)
ANION GAP SERPL CALCULATED.3IONS-SCNC: 7.2 MMOL/L (ref 5–15)
AST SERPL-CCNC: 19 U/L (ref 1–32)
BASOPHILS # BLD AUTO: 0.04 10*3/MM3 (ref 0–0.2)
BASOPHILS NFR BLD AUTO: 0.5 % (ref 0–1.5)
BILIRUB SERPL-MCNC: 0.4 MG/DL (ref 0–1.2)
BUN SERPL-MCNC: 14 MG/DL (ref 8–23)
BUN/CREAT SERPL: 11.8 (ref 7–25)
CALCIUM SPEC-SCNC: 9.5 MG/DL (ref 8.6–10.5)
CHLORIDE SERPL-SCNC: 104 MMOL/L (ref 98–107)
CHOLEST SERPL-MCNC: 224 MG/DL (ref 0–200)
CO2 SERPL-SCNC: 25.8 MMOL/L (ref 22–29)
CREAT SERPL-MCNC: 1.19 MG/DL (ref 0.57–1)
DEPRECATED RDW RBC AUTO: 42.6 FL (ref 37–54)
EOSINOPHIL # BLD AUTO: 0.08 10*3/MM3 (ref 0–0.4)
EOSINOPHIL NFR BLD AUTO: 1 % (ref 0.3–6.2)
ERYTHROCYTE [DISTWIDTH] IN BLOOD BY AUTOMATED COUNT: 12.9 % (ref 12.3–15.4)
GFR SERPL CREATININE-BSD FRML MDRD: 46 ML/MIN/1.73
GLOBULIN UR ELPH-MCNC: 2.6 GM/DL
GLUCOSE SERPL-MCNC: 94 MG/DL (ref 65–99)
HBA1C MFR BLD: 5.7 % (ref 4.8–5.6)
HCT VFR BLD AUTO: 43.1 % (ref 34–46.6)
HDLC SERPL-MCNC: 62 MG/DL (ref 40–60)
HGB BLD-MCNC: 14.1 G/DL (ref 12–15.9)
IMM GRANULOCYTES # BLD AUTO: 0.02 10*3/MM3 (ref 0–0.05)
IMM GRANULOCYTES NFR BLD AUTO: 0.3 % (ref 0–0.5)
LDLC SERPL CALC-MCNC: 146 MG/DL (ref 0–100)
LDLC/HDLC SERPL: 2.32 {RATIO}
LYMPHOCYTES # BLD AUTO: 2.08 10*3/MM3 (ref 0.7–3.1)
LYMPHOCYTES NFR BLD AUTO: 26.6 % (ref 19.6–45.3)
MCH RBC QN AUTO: 29.6 PG (ref 26.6–33)
MCHC RBC AUTO-ENTMCNC: 32.7 G/DL (ref 31.5–35.7)
MCV RBC AUTO: 90.5 FL (ref 79–97)
MONOCYTES # BLD AUTO: 0.43 10*3/MM3 (ref 0.1–0.9)
MONOCYTES NFR BLD AUTO: 5.5 % (ref 5–12)
NEUTROPHILS NFR BLD AUTO: 5.16 10*3/MM3 (ref 1.7–7)
NEUTROPHILS NFR BLD AUTO: 66.1 % (ref 42.7–76)
NRBC BLD AUTO-RTO: 0 /100 WBC (ref 0–0.2)
PLATELET # BLD AUTO: 348 10*3/MM3 (ref 140–450)
PMV BLD AUTO: 10.8 FL (ref 6–12)
POTASSIUM SERPL-SCNC: 4.3 MMOL/L (ref 3.5–5.2)
PROT SERPL-MCNC: 7 G/DL (ref 6–8.5)
RBC # BLD AUTO: 4.76 10*6/MM3 (ref 3.77–5.28)
SARS-COV-2 N GENE RESP QL NAA+PROBE: NOT DETECTED
SODIUM SERPL-SCNC: 137 MMOL/L (ref 136–145)
TRIGL SERPL-MCNC: 90 MG/DL (ref 0–150)
TSH SERPL DL<=0.05 MIU/L-ACNC: 1.41 UIU/ML (ref 0.27–4.2)
VLDLC SERPL-MCNC: 16 MG/DL (ref 5–40)
WBC # BLD AUTO: 7.81 10*3/MM3 (ref 3.4–10.8)

## 2021-10-28 PROCEDURE — C9803 HOPD COVID-19 SPEC COLLECT: HCPCS

## 2021-10-28 PROCEDURE — 83036 HEMOGLOBIN GLYCOSYLATED A1C: CPT

## 2021-10-28 PROCEDURE — 80053 COMPREHEN METABOLIC PANEL: CPT

## 2021-10-28 PROCEDURE — 87635 SARS-COV-2 COVID-19 AMP PRB: CPT

## 2021-10-28 PROCEDURE — 84443 ASSAY THYROID STIM HORMONE: CPT

## 2021-10-28 PROCEDURE — 36415 COLL VENOUS BLD VENIPUNCTURE: CPT

## 2021-10-28 PROCEDURE — 85025 COMPLETE CBC W/AUTO DIFF WBC: CPT

## 2021-10-28 PROCEDURE — 80061 LIPID PANEL: CPT

## 2021-11-01 ENCOUNTER — HOSPITAL ENCOUNTER (OUTPATIENT)
Dept: CT IMAGING | Facility: HOSPITAL | Age: 66
Discharge: HOME | End: 2021-11-01
Admitting: INTERNAL MEDICINE

## 2021-11-01 DIAGNOSIS — M89.8X9 BONE MASS: ICD-10-CM

## 2021-11-01 DIAGNOSIS — N63.0 BREAST LUMP: ICD-10-CM

## 2021-11-01 PROCEDURE — 71260 CT THORAX DX C+: CPT

## 2021-11-01 PROCEDURE — 74177 CT ABD & PELVIS W/CONTRAST: CPT

## 2021-11-01 PROCEDURE — 0 IOPAMIDOL PER 1 ML: Performed by: INTERNAL MEDICINE

## 2021-11-01 PROCEDURE — 70491 CT SOFT TISSUE NECK W/DYE: CPT

## 2021-11-01 RX ADMIN — IOPAMIDOL 150 ML: 755 INJECTION, SOLUTION INTRAVENOUS at 09:27

## 2021-11-02 ENCOUNTER — ANESTHESIA (OUTPATIENT)
Dept: PERIOP | Facility: HOSPITAL | Age: 66
End: 2021-11-02

## 2021-11-02 ENCOUNTER — APPOINTMENT (OUTPATIENT)
Dept: GENERAL RADIOLOGY | Facility: HOSPITAL | Age: 66
End: 2021-11-02

## 2021-11-02 ENCOUNTER — TELEPHONE (OUTPATIENT)
Dept: FAMILY MEDICINE CLINIC | Facility: CLINIC | Age: 66
End: 2021-11-02

## 2021-11-02 ENCOUNTER — HOSPITAL ENCOUNTER (OUTPATIENT)
Facility: HOSPITAL | Age: 66
Setting detail: HOSPITAL OUTPATIENT SURGERY
Discharge: HOME | End: 2021-11-02
Attending: UROLOGY | Admitting: UROLOGY

## 2021-11-02 ENCOUNTER — ANESTHESIA EVENT (OUTPATIENT)
Dept: PERIOP | Facility: HOSPITAL | Age: 66
End: 2021-11-02

## 2021-11-02 VITALS
WEIGHT: 139.55 LBS | HEART RATE: 66 BPM | DIASTOLIC BLOOD PRESSURE: 62 MMHG | TEMPERATURE: 97.6 F | HEIGHT: 62 IN | OXYGEN SATURATION: 98 % | BODY MASS INDEX: 25.68 KG/M2 | SYSTOLIC BLOOD PRESSURE: 110 MMHG | RESPIRATION RATE: 16 BRPM

## 2021-11-02 DIAGNOSIS — C68.9 UROTHELIAL CANCER (HCC): ICD-10-CM

## 2021-11-02 DIAGNOSIS — E04.2 MULTINODULAR GOITER: Primary | ICD-10-CM

## 2021-11-02 PROCEDURE — 25010000002 ONDANSETRON PER 1 MG: Performed by: NURSE ANESTHETIST, CERTIFIED REGISTERED

## 2021-11-02 PROCEDURE — 52234 CYSTOSCOPY AND TREATMENT: CPT | Performed by: UROLOGY

## 2021-11-02 PROCEDURE — 74420 UROGRAPHY RTRGR +-KUB: CPT

## 2021-11-02 PROCEDURE — 0 IOPAMIDOL PER 1 ML: Performed by: UROLOGY

## 2021-11-02 PROCEDURE — 25010000002 PROPOFOL 10 MG/ML EMULSION: Performed by: NURSE ANESTHETIST, CERTIFIED REGISTERED

## 2021-11-02 PROCEDURE — 25010000002 MIDAZOLAM PER 1MG: Performed by: ANESTHESIOLOGY

## 2021-11-02 PROCEDURE — 52005 CYSTO W/URTRL CATHJ: CPT | Performed by: UROLOGY

## 2021-11-02 PROCEDURE — 25010000002 HYDROMORPHONE PER 4 MG: Performed by: NURSE ANESTHETIST, CERTIFIED REGISTERED

## 2021-11-02 PROCEDURE — 25010000002 DEXAMETHASONE PER 1 MG: Performed by: NURSE ANESTHETIST, CERTIFIED REGISTERED

## 2021-11-02 PROCEDURE — C1758 CATHETER, URETERAL: HCPCS | Performed by: UROLOGY

## 2021-11-02 PROCEDURE — 88307 TISSUE EXAM BY PATHOLOGIST: CPT | Performed by: UROLOGY

## 2021-11-02 PROCEDURE — 25010000002 LEVOFLOXACIN PER 250 MG: Performed by: UROLOGY

## 2021-11-02 RX ORDER — DEXAMETHASONE SODIUM PHOSPHATE 4 MG/ML
INJECTION, SOLUTION INTRA-ARTICULAR; INTRALESIONAL; INTRAMUSCULAR; INTRAVENOUS; SOFT TISSUE AS NEEDED
Status: DISCONTINUED | OUTPATIENT
Start: 2021-11-02 | End: 2021-11-02 | Stop reason: SURG

## 2021-11-02 RX ORDER — ONDANSETRON 2 MG/ML
4 INJECTION INTRAMUSCULAR; INTRAVENOUS ONCE AS NEEDED
Status: DISCONTINUED | OUTPATIENT
Start: 2021-11-02 | End: 2021-11-02 | Stop reason: HOSPADM

## 2021-11-02 RX ORDER — SODIUM CHLORIDE 9 MG/ML
100 INJECTION, SOLUTION INTRAVENOUS CONTINUOUS
Status: DISCONTINUED | OUTPATIENT
Start: 2021-11-02 | End: 2021-11-02 | Stop reason: HOSPADM

## 2021-11-02 RX ORDER — IBUPROFEN 600 MG/1
600 TABLET ORAL EVERY 6 HOURS PRN
Status: DISCONTINUED | OUTPATIENT
Start: 2021-11-02 | End: 2021-11-02 | Stop reason: HOSPADM

## 2021-11-02 RX ORDER — PROMETHAZINE HYDROCHLORIDE 25 MG/1
25 SUPPOSITORY RECTAL ONCE AS NEEDED
Status: DISCONTINUED | OUTPATIENT
Start: 2021-11-02 | End: 2021-11-02 | Stop reason: HOSPADM

## 2021-11-02 RX ORDER — DEXMEDETOMIDINE HYDROCHLORIDE 100 UG/ML
INJECTION, SOLUTION INTRAVENOUS AS NEEDED
Status: DISCONTINUED | OUTPATIENT
Start: 2021-11-02 | End: 2021-11-02 | Stop reason: SURG

## 2021-11-02 RX ORDER — ONDANSETRON 2 MG/ML
INJECTION INTRAMUSCULAR; INTRAVENOUS AS NEEDED
Status: DISCONTINUED | OUTPATIENT
Start: 2021-11-02 | End: 2021-11-02 | Stop reason: SURG

## 2021-11-02 RX ORDER — PROPOFOL 10 MG/ML
VIAL (ML) INTRAVENOUS AS NEEDED
Status: DISCONTINUED | OUTPATIENT
Start: 2021-11-02 | End: 2021-11-02 | Stop reason: SURG

## 2021-11-02 RX ORDER — EPHEDRINE SULFATE 50 MG/ML
INJECTION, SOLUTION INTRAVENOUS AS NEEDED
Status: DISCONTINUED | OUTPATIENT
Start: 2021-11-02 | End: 2021-11-02 | Stop reason: SURG

## 2021-11-02 RX ORDER — ACETAMINOPHEN 500 MG
1000 TABLET ORAL ONCE
Status: DISCONTINUED | OUTPATIENT
Start: 2021-11-02 | End: 2021-11-02 | Stop reason: HOSPADM

## 2021-11-02 RX ORDER — SODIUM CHLORIDE 0.9 % (FLUSH) 0.9 %
10 SYRINGE (ML) INJECTION EVERY 12 HOURS SCHEDULED
Status: DISCONTINUED | OUTPATIENT
Start: 2021-11-02 | End: 2021-11-02 | Stop reason: HOSPADM

## 2021-11-02 RX ORDER — ACETAMINOPHEN 325 MG/1
650 TABLET ORAL ONCE
Status: DISCONTINUED | OUTPATIENT
Start: 2021-11-02 | End: 2021-11-02 | Stop reason: HOSPADM

## 2021-11-02 RX ORDER — ONDANSETRON 4 MG/1
4 TABLET, FILM COATED ORAL ONCE AS NEEDED
Status: DISCONTINUED | OUTPATIENT
Start: 2021-11-02 | End: 2021-11-02 | Stop reason: HOSPADM

## 2021-11-02 RX ORDER — OXYCODONE HYDROCHLORIDE 5 MG/1
5 TABLET ORAL
Status: DISCONTINUED | OUTPATIENT
Start: 2021-11-02 | End: 2021-11-02 | Stop reason: HOSPADM

## 2021-11-02 RX ORDER — MAGNESIUM HYDROXIDE 1200 MG/15ML
LIQUID ORAL AS NEEDED
Status: DISCONTINUED | OUTPATIENT
Start: 2021-11-02 | End: 2021-11-02 | Stop reason: HOSPADM

## 2021-11-02 RX ORDER — PROMETHAZINE HYDROCHLORIDE 12.5 MG/1
25 TABLET ORAL ONCE AS NEEDED
Status: DISCONTINUED | OUTPATIENT
Start: 2021-11-02 | End: 2021-11-02 | Stop reason: HOSPADM

## 2021-11-02 RX ORDER — PROMETHAZINE HYDROCHLORIDE 12.5 MG/1
12.5 TABLET ORAL ONCE AS NEEDED
Status: DISCONTINUED | OUTPATIENT
Start: 2021-11-02 | End: 2021-11-02 | Stop reason: HOSPADM

## 2021-11-02 RX ORDER — MEPERIDINE HYDROCHLORIDE 25 MG/ML
12.5 INJECTION INTRAMUSCULAR; INTRAVENOUS; SUBCUTANEOUS
Status: DISCONTINUED | OUTPATIENT
Start: 2021-11-02 | End: 2021-11-02 | Stop reason: HOSPADM

## 2021-11-02 RX ORDER — LIDOCAINE HYDROCHLORIDE 20 MG/ML
INJECTION, SOLUTION INFILTRATION; PERINEURAL AS NEEDED
Status: DISCONTINUED | OUTPATIENT
Start: 2021-11-02 | End: 2021-11-02 | Stop reason: SURG

## 2021-11-02 RX ORDER — HYDROMORPHONE HCL 110MG/55ML
PATIENT CONTROLLED ANALGESIA SYRINGE INTRAVENOUS AS NEEDED
Status: DISCONTINUED | OUTPATIENT
Start: 2021-11-02 | End: 2021-11-02 | Stop reason: SURG

## 2021-11-02 RX ORDER — SODIUM CHLORIDE 0.9 % (FLUSH) 0.9 %
10 SYRINGE (ML) INJECTION AS NEEDED
Status: DISCONTINUED | OUTPATIENT
Start: 2021-11-02 | End: 2021-11-02 | Stop reason: HOSPADM

## 2021-11-02 RX ORDER — LEVOFLOXACIN 5 MG/ML
500 INJECTION, SOLUTION INTRAVENOUS ONCE
Status: COMPLETED | OUTPATIENT
Start: 2021-11-02 | End: 2021-11-02

## 2021-11-02 RX ORDER — MIDAZOLAM HYDROCHLORIDE 2 MG/2ML
2 INJECTION, SOLUTION INTRAMUSCULAR; INTRAVENOUS ONCE
Status: COMPLETED | OUTPATIENT
Start: 2021-11-02 | End: 2021-11-02

## 2021-11-02 RX ORDER — SODIUM CHLORIDE, SODIUM LACTATE, POTASSIUM CHLORIDE, CALCIUM CHLORIDE 600; 310; 30; 20 MG/100ML; MG/100ML; MG/100ML; MG/100ML
9 INJECTION, SOLUTION INTRAVENOUS CONTINUOUS PRN
Status: DISCONTINUED | OUTPATIENT
Start: 2021-11-02 | End: 2021-11-02 | Stop reason: HOSPADM

## 2021-11-02 RX ADMIN — LEVOFLOXACIN 500 MG: 5 INJECTION, SOLUTION INTRAVENOUS at 10:44

## 2021-11-02 RX ADMIN — LIDOCAINE HYDROCHLORIDE 33 MG: 20 INJECTION, SOLUTION INFILTRATION; PERINEURAL at 10:46

## 2021-11-02 RX ADMIN — PROPOFOL 50 MG: 10 INJECTION, EMULSION INTRAVENOUS at 10:47

## 2021-11-02 RX ADMIN — EPHEDRINE SULFATE 5 MG: 50 INJECTION INTRAVENOUS at 10:58

## 2021-11-02 RX ADMIN — ONDANSETRON 4 MG: 2 INJECTION INTRAMUSCULAR; INTRAVENOUS at 10:54

## 2021-11-02 RX ADMIN — SODIUM CHLORIDE, POTASSIUM CHLORIDE, SODIUM LACTATE AND CALCIUM CHLORIDE 9 ML/HR: 600; 310; 30; 20 INJECTION, SOLUTION INTRAVENOUS at 09:29

## 2021-11-02 RX ADMIN — EPHEDRINE SULFATE 10 MG: 50 INJECTION INTRAVENOUS at 11:01

## 2021-11-02 RX ADMIN — PROPOFOL 333 MCG/KG/MIN: 10 INJECTION, EMULSION INTRAVENOUS at 10:46

## 2021-11-02 RX ADMIN — DEXMEDETOMIDINE HYDROCHLORIDE 15 MCG: 100 INJECTION, SOLUTION, CONCENTRATE INTRAVENOUS at 10:50

## 2021-11-02 RX ADMIN — DEXAMETHASONE SODIUM PHOSPHATE 4 MG: 4 INJECTION INTRA-ARTICULAR; INTRALESIONAL; INTRAMUSCULAR; INTRAVENOUS; SOFT TISSUE at 10:54

## 2021-11-02 RX ADMIN — MIDAZOLAM HYDROCHLORIDE 2 MG: 1 INJECTION, SOLUTION INTRAMUSCULAR; INTRAVENOUS at 10:29

## 2021-11-02 RX ADMIN — HYDROMORPHONE HYDROCHLORIDE 0.5 MG: 2 INJECTION, SOLUTION INTRAMUSCULAR; INTRAVENOUS; SUBCUTANEOUS at 10:54

## 2021-11-02 RX ADMIN — EPHEDRINE SULFATE 10 MG: 50 INJECTION INTRAVENOUS at 11:10

## 2021-11-02 NOTE — TELEPHONE ENCOUNTER
----- Message from Jeff Art MD sent at 11/2/2021 10:16 AM EDT -----  Your CT neck shows possible thyroid nodules and recommending thyroid ultrasound which I have ordered.  Expect a call for a thyroid ultrasound.  Otherwise no acute findings noted.  Continue with follow-up as recommended

## 2021-11-02 NOTE — DISCHARGE INSTRUCTIONS
DISCHARGE INSTRUCTIONS  BRONCHOSCOPY/  ENDOBRONCHIAL ULTRASOUND      ? For your procedure you had:  ? General Anesthesia (you may have a sore throat for the first 24 hours)  ? IV sedation.  ? Monitored anesthesia care  ? You received a medicated patch for nausea prevention today (behind the ear). It is recommended that you remove it 24-48 hours post-operatively. It must be removed within 72 hours.   ? You have received an anesthesia medication today that can cause hormonal forms of birth control to be ineffective. You should use a different form of birth control (to prevent pregnancy) for 7 days.   ? You may experience dizziness, drowsiness, or lightheadedness for several hours following surgery/procedure.  ? Do not stay alone today or tonight.  ? Limit your activity for 24 hours.  ? You should not drive or operate machinery or drink alcohol for 24 hours or while you are taking pain medication.  ? You should not sign legally binding documents.  ? Resume your diet slowly.  Follow any special dietary instructions you may have been given by your doctor.  SPECIAL INSTRUCTIONS:                   ? Following your bronchoscopy, you may experience a mild sore throat or cough up small amounts of blood.  Extremes of either should be reported to your doctor.  ? If you have shortness of breath, chest pain, or fever over   degrees or any other signs of infection, you should notify your doctor.  ? Smokers are encouraged not to smoke for 6 to 8 hours after the procedure to decrease the risk of coughing and bleeding.  ? Nausea and vomiting can occur, but is not a common side effect of the procedure you have had today. If you experience any nausea or vomiting, take clear liquids only for the next meal.   ? Medications per physician instructions as indicated on Discharge Medication Information Sheet.  You should see    for follow-up care   on   .  Phone number:     ? Missing your appointment/follow-up could lead to serious  complications.  ? If you have an emergency and cannot reach your doctor, go to an Emergency Room nearest your home.  ? Access Lab and other Diagnostic Test results and manage your personal health records by going to: [email or web address]                    DISCHARGE INSTRUCTIONS  BRONCHOSCOPY/  ENDOBRONCHIAL ULTRASOUND      ? For your procedure you had:  ? General Anesthesia (you may have a sore throat for the first 24 hours)  ? IV sedation.  ? Monitored anesthesia care  ? You received a medicated patch for nausea prevention today (behind the ear). It is recommended that you remove it 24-48 hours post-operatively. It must be removed within 72 hours.   ? You have received an anesthesia medication today that can cause hormonal forms of birth control to be ineffective. You should use a different form of birth control (to prevent pregnancy) for 7 days.   ? You may experience dizziness, drowsiness, or lightheadedness for several hours following surgery/procedure.  ? Do not stay alone today or tonight.  ? Limit your activity for 24 hours.  ? You should not drive or operate machinery or drink alcohol for 24 hours or while you are taking pain medication.  ? You should not sign legally binding documents.  ? Resume your diet slowly.  Follow any special dietary instructions you may have been given by your doctor.  SPECIAL INSTRUCTIONS:                   ? Following your bronchoscopy, you may experience a mild sore throat or cough up small amounts of blood.  Extremes of either should be reported to your doctor.  ? If you have shortness of breath, chest pain, or fever over   degrees or any other signs of infection, you should notify your doctor.  ? Smokers are encouraged not to smoke for 6 to 8 hours after the procedure to decrease the risk of coughing and bleeding.  ? Nausea and vomiting can occur, but is not a common side effect of the procedure you have had today. If you experience any nausea or vomiting, take clear liquids only for  the next meal.   ? Medications per physician instructions as indicated on Discharge Medication Information Sheet.  You should see    for follow-up care   on   .  Phone number:     ? Missing your appointment/follow-up could lead to serious complications.  ? If you have an emergency and cannot reach your doctor, go to an Emergency Room nearest your home.  ? Access Lab and other Diagnostic Test results and manage your personal health records by going to: [email or web address]                    DISCHARGE INSTRUCTIONS  BRONCHOSCOPY/  ENDOBRONCHIAL ULTRASOUND      ? For your procedure you had:  ? General Anesthesia (you may have a sore throat for the first 24 hours)  ? IV sedation.  ? Monitored anesthesia care  ? You received a medicated patch for nausea prevention today (behind the ear). It is recommended that you remove it 24-48 hours post-operatively. It must be removed within 72 hours.   ? You have received an anesthesia medication today that can cause hormonal forms of birth control to be ineffective. You should use a different form of birth control (to prevent pregnancy) for 7 days.   ? You may experience dizziness, drowsiness, or lightheadedness for several hours following surgery/procedure.  ? Do not stay alone today or tonight.  ? Limit your activity for 24 hours.  ? You should not drive or operate machinery or drink alcohol for 24 hours or while you are taking pain medication.  ? You should not sign legally binding documents.  ? Resume your diet slowly.  Follow any special dietary instructions you may have been given by your doctor.  SPECIAL INSTRUCTIONS:                   ? Following your bronchoscopy, you may experience a mild sore throat or cough up small amounts of blood.  Extremes of either should be reported to your doctor.  ? If you have shortness of breath, chest pain, or fever over   degrees or any other signs of infection, you should notify your doctor.  ? Smokers are encouraged not to smoke for 6 to 8 hours  after the procedure to decrease the risk of coughing and bleeding.  ? Nausea and vomiting can occur, but is not a common side effect of the procedure you have had today. If you experience any nausea or vomiting, take clear liquids only for the next meal.   ? Medications per physician instructions as indicated on Discharge Medication Information Sheet.  You should see    for follow-up care   on   .  Phone number:     ? Missing your appointment/follow-up could lead to serious complications.  ? If you have an emergency and cannot reach your doctor, go to an Emergency Room nearest your home.  ? Access Lab and other Diagnostic Test results and manage your personal health records by going to: [email or web address]                  DISCHARGE INSTRUCTIONS CYSTOSCOPY      ? For your surgery you had:  ? General anesthesia (you may have a sore throat for the first 24 hours)  ? IV sedation  ? Local anesthesia  ? Monitored anesthesia care  ? You received a medicated patch for nausea prevention today (behind your ear). It is recommended that you remove it 24-48 hours post-operatively. It must be removed within 72 hours.  ? You have received an anesthesia medication today that can cause hormonal forms of birth control to be ineffective. You should use a different form of birth control (to prevent pregnancy) for 7 days.   ? You may experience dizziness, drowsiness, or lightheadedness for several hours following surgery.  ? Do not stay alone today or tonight.  ? Limit your activity for 24 hours.  ? You should not drive, operate machinery, drink alcohol, or sign legally binding documents for 24 hours or while you are taking pain medication.  ? Resume your diet slowly.  Follow any special dietary instructions you may have been given by your doctor.  Last dose of pain medication given at:   .  NOTIFY YOUR DOCTOR IF YOU EXPERIENCE ANY OF THE FOLLOWING:  ? Temperature greater than 101 degrees Fahrenheit  ? Shaking Chills  ? Redness or excessive  drainage from incision  ? Nausea, vomiting and/or pain that is not controlled by prescribed medications  ? Increase in bleeding or bleeding that is excessive  ? Unable to urinate in 6 hours after surgery  ? If unable to reach your doctor, please go to the closest Emergency Room   ? Following your cystoscopy exam, you may experience burning upon urination.  You may also pass some bloody urine.  If the burning sensation and/or bloody urine should persist beyond 48 hours, call your doctor.  ? To encourage kidney and bladder function, you should drink as much fluid as possible.  ? If you have difficulty urinating, try sitting in a bath tub of warm water.  If you become uncomfortable because you cannot urinate, call your doctor or come to the Emergency Room at the hospital.  ? Medications per physician instructions as indicated on Discharge Medication Information Sheet.  ? You should see   for follow-up care  on   .  Phone number:        SPECIAL INSTRUCTIONS:

## 2021-11-02 NOTE — ANESTHESIA PREPROCEDURE EVALUATION
Anesthesia Evaluation                  Airway   Mallampati: I  TM distance: >3 FB  Neck ROM: full  No difficulty expected  Dental    (+) partials, lower dentures and upper dentures    Pulmonary - normal exam    breath sounds clear to auscultation  (+) lung cancer,   Cardiovascular - normal exam    Rhythm: regular        Neuro/Psych  GI/Hepatic/Renal/Endo    (+)  GERD,  renal disease,     Musculoskeletal     Abdominal    Substance History      OB/GYN          Other                        Anesthesia Plan    ASA 3     general   (Patient understands anesthesia not responsible for dental damage.)  intravenous induction     Anesthetic plan, all risks, benefits, and alternatives have been provided, discussed and informed consent has been obtained with: patient.    Plan discussed with CRNA.

## 2021-11-02 NOTE — ANESTHESIA POSTPROCEDURE EVALUATION
Patient: Neda Núñez    Procedure Summary     Date: 11/02/21 Room / Location: [facility] OR 07 / [facility] MAIN OR    Anesthesia Start: 1042 Anesthesia Stop: 1134    Procedure: CYSTOSCOPY TRANSURETHRAL RESECTION OF BLADDER TUMOR; CYSTOSCOPY RETROGRADE PYELOGRAM right (N/A ) Diagnosis:       Urothelial cancer (HCC)      (Urothelial cancer (HCC) [C68.9])    Surgeons: Kadie Vu MD Provider: Kush Lindo MD    Anesthesia Type: general ASA Status: 3          Anesthesia Type: general    Vitals  Vitals Value Taken Time   /58 11/02/21 1204   Temp 36.3 °C (97.4 °F) 11/02/21 1134   Pulse 72 11/02/21 1206   Resp 16 11/02/21 1151   SpO2 91 % 11/02/21 1206   Vitals shown include unvalidated device data.        Post Anesthesia Care and Evaluation    Patient location during evaluation: bedside  Patient participation: complete - patient participated  Level of consciousness: awake and alert  Pain management: adequate  Airway patency: patent  Anesthetic complications: No anesthetic complications  PONV Status: none  Cardiovascular status: acceptable  Respiratory status: acceptable  Hydration status: acceptable

## 2021-11-02 NOTE — OP NOTE
CYSTOSCOPY TRANSURETHRAL RESECTION OF BLADDER TUMOR  Procedure Report    Patient Name:  Neda Núeñz  YOB: 1955    Date of Surgery:  11/2/2021     Pre-op Diagnosis:   Urothelial cancer (HCC) [C68.9]       Post-Op Diagnosis Codes:     * Urothelial cancer (HCC) [C68.9]      Procedure/CPT® Codes:    Procedure(s):  CYSTOSCOPY TRANSURETHRAL RESECTION OF BLADDER TUMOR; CYSTOSCOPY RETROGRADE PYELOGRAM right    Staff:  Surgeon(s):  Kadie Vu MD       Anesthesia: Sedation    Estimated Blood Loss: none    Implants:    Nothing was implanted during the procedure    Specimen:          Specimens     ID Source Type Tests Collected By Collected At Frozen?    A Urinary Bladder Tissue · TISSUE PATHOLOGY EXAM   Kadie Vu MD 11/2/21 1117     Description: bladder tumor              Complications: None    Description of Procedure:     After proper consent was obtained, patient was taken to operating room and general anesthesia was performed.  The patient was placed in the dorsal lithotomy position and prepped and draped in the normal sterile fashion for cystoscopy.      A 22 Malay rigid cystoscope was inserted into the bladder.  A right retrograde pyelogram was performed which was normal.  There are no filling defects tumor stones or other abnormalities in the right ureter.     The bladder was then inspected in a systemic meridian fashion using a 30 degree lens.  There was a small papillary appearing area at the bladder neck on the right side..  The tumor was papillary in nature.  A gyrus resectoscope was inserted into the bladder with a medium loop.  The loop was then used to resect the lesion in its entirety.  There was no perforation of the bladder noted.  The area of resection was then fulgurated to stop any bleeding.  Good muscle was sent with the specimen.  The entire area of resection was approximately 2 cm in size.  Again, no peroration of the bladder was noted.      The pieces of bladder tumor  were then removed from the bladder and sent to pathology labeled as bladder tumor.  There was no remaining bleeding.      The patient tolerated the procedure well and was transferred to the PACU in stable condition.      Kadie Vu MD     Date: 11/2/2021  Time: 12:39 EDT

## 2021-11-03 LAB
CYTO UR: NORMAL
LAB AP CASE REPORT: NORMAL
LAB AP CLINICAL INFORMATION: NORMAL
PATH REPORT.FINAL DX SPEC: NORMAL
PATH REPORT.GROSS SPEC: NORMAL

## 2021-11-08 ENCOUNTER — OFFICE VISIT (OUTPATIENT)
Dept: ONCOLOGY | Facility: HOSPITAL | Age: 66
End: 2021-11-08

## 2021-11-08 VITALS
TEMPERATURE: 97.1 F | BODY MASS INDEX: 25.69 KG/M2 | OXYGEN SATURATION: 99 % | SYSTOLIC BLOOD PRESSURE: 156 MMHG | RESPIRATION RATE: 18 BRPM | DIASTOLIC BLOOD PRESSURE: 76 MMHG | WEIGHT: 140.43 LBS | HEART RATE: 77 BPM

## 2021-11-08 DIAGNOSIS — J38.01 PARALYSIS OF RIGHT VOCAL CORD: ICD-10-CM

## 2021-11-08 DIAGNOSIS — J38.00 VOCAL CORD PARALYSIS: ICD-10-CM

## 2021-11-08 DIAGNOSIS — N94.89 ADNEXAL MASS: ICD-10-CM

## 2021-11-08 DIAGNOSIS — C68.9 UROTHELIAL CANCER (HCC): Primary | ICD-10-CM

## 2021-11-08 DIAGNOSIS — E04.1 THYROID NODULE: ICD-10-CM

## 2021-11-08 DIAGNOSIS — N94.9 ADNEXAL FULLNESS: ICD-10-CM

## 2021-11-08 DIAGNOSIS — C34.11 MALIGNANT NEOPLASM OF UPPER LOBE OF RIGHT LUNG (HCC): ICD-10-CM

## 2021-11-08 PROCEDURE — 99215 OFFICE O/P EST HI 40 MIN: CPT | Performed by: INTERNAL MEDICINE

## 2021-11-08 NOTE — PROGRESS NOTES
Patient  Neda Núñez    Location  [facility] HEMATOLOGY & ONCOLOGY    Chief Complaint  Breast Mass (-F1)    Referring Provider: Jeff Art MD  PCP: Jeff Art MD    Subjective          Oncology/Hematology History Overview Note   Urothelial Carcinoma of the Renal Pelvis:  - s/p left nephroureterectomy in Sept 2020  - pathology showed papillary urothelial carcinoma, invasive, high grade, invading into the renal parenchyma, margins negative, no lymph nodes submitted, pT3Nx    RUL Lung Cancer:  - 2014 at an OSH with NSCLC  - treated with surgery and chemotherapy     Lung cancer (HCC)   10/25/2021 Initial Diagnosis    Lung cancer (HCC)         History of Present Illness  Patient comes in today to follow-up after imaging and bladder biopsy.  Fortunately the biopsy shows inflammation only.  The patient was thrilled by this news as she was not yet aware.  She has follow-up with urology in a couple of days.  The CT neck, chest, abdomen, and pelvis were done on 11/1/2021.  This showed no evidence of metastatic disease but there is some left adnexal fullness.  She also has some small level 2 lymph nodes enlarged in the neck and bilateral thyroid nodules.  She tells me that her primary care provider has already ordered an ultrasound of the thyroid to better evaluate.    CT of the neck also points to his slight deviation of the true vocal cord on the right.  I discussed this with the patient and she was interested in referral to ENT.    Review of Systems   Constitutional: Positive for fatigue. Negative for appetite change, diaphoresis, fever, unexpected weight gain and unexpected weight loss.   HENT: Positive for ear discharge and ear pain. Negative for hearing loss, sore throat and voice change.    Eyes: Negative for blurred vision, double vision, pain, redness and visual disturbance.   Respiratory: Negative for cough, shortness of breath and wheezing.    Cardiovascular: Negative for chest pain,  palpitations and leg swelling.   Endocrine: Negative for cold intolerance, heat intolerance, polydipsia and polyuria.   Genitourinary: Negative for decreased urine volume, difficulty urinating, frequency and urinary incontinence.   Musculoskeletal: Negative for arthralgias, back pain, joint swelling and myalgias.   Skin: Negative for color change, rash, skin lesions and bruise.   Neurological: Negative for dizziness, seizures, numbness and headache.   Hematological: Negative for adenopathy. Does not bruise/bleed easily.   Psychiatric/Behavioral: Negative for depressed mood. The patient is not nervous/anxious.    All other systems reviewed and are negative.      Past Medical History:   Diagnosis Date   • Allergies    • Ear problem    • Lung cancer (HCC)    • Reflux esophagitis    • Ureteral mass    • Urothelial cancer (HCC) 2020    (Left)   • Vocal cord dysfunction      Past Surgical History:   Procedure Laterality Date   • BACK SURGERY      LUMBAR   •  SECTION     • CYSTOSCOPY URETERAL BIOPSY     • CYSTOSCOPY, URETEROSCOPY, RETROGRADE PYELOGRAM, STENT INSERTION     • KIDNEY SURGERY     • LEG SURGERY      ignacio and pins    • LUNG SURGERY Right     upper lobe removed   • NEPHRECTOMY Left    • TRANSURETHRAL RESECTION OF BLADDER TUMOR N/A 2021    Procedure: CYSTOSCOPY TRANSURETHRAL RESECTION OF BLADDER TUMOR; CYSTOSCOPY RETROGRADE PYELOGRAM right;  Surgeon: Kadie Vu MD;  Location: [facility] OR;  Service: Urology;  Laterality: N/A;     Social History     Socioeconomic History   • Marital status:    Tobacco Use   • Smoking status: Former Smoker     Packs/day: 2.00   • Smokeless tobacco: Never Used   • Tobacco comment: Age: [age]   Vaping Use   • Vaping Use: Never used   Substance and Sexual Activity   • Alcohol use: Not Currently     Comment: Light - Rarely as of 2020   • Drug use: Never   • Sexual activity: Defer     Family History   Problem Relation Age of Onset   • Diabetes  Brother         Unspecified Type   • Diabetes Maternal Grandfather         Unspecified Type   • Breast cancer Paternal Grandmother    • Cancer Other         Unspecified   • Migraines Other         Migraine Headaches   • Lung cancer Other        Objective   Physical Exam  General: Alert, cooperative, no acute distress, well appearing, healthy weight  Eyes: Anicteric sclera, PERRLA  Respiratory: normal respiratory effort  Cardiovascular: no lower extremity edema  Skin: Normal tone, no rash, no lesions  Psychiatric: Appropriate affect, intact judgment  Neurologic: No focal sensory or motor deficits, normal cognition   Musculoskeletal: Normal muscle strength and tone  Extremities: No clubbing, cyanosis, or deformities    Vitals:    11/08/21 1401   BP: 156/76   Pulse: 77   Resp: 18   Temp: 97.1 °F (36.2 °C)   SpO2: 99%   Weight: 63.7 kg (140 lb 6.9 oz)   PainSc: 0-No pain     ECOG score: 0         PHQ-9 Total Score: 0       Result Review :   The following data was reviewed by: Rochelle Frankel MD PhD on 11/08/2021:  Lab Results   Component Value Date    HGB 14.1 10/28/2021    HCT 43.1 10/28/2021    MCV 90.5 10/28/2021     10/28/2021    WBC 7.81 10/28/2021    NEUTROABS 5.16 10/28/2021    LYMPHSABS 2.08 10/28/2021    MONOSABS 0.43 10/28/2021    EOSABS 0.08 10/28/2021    BASOSABS 0.04 10/28/2021     Lab Results   Component Value Date    GLUCOSE 94 10/28/2021    BUN 14 10/28/2021    CREATININE 1.19 (H) 10/28/2021     10/28/2021    K 4.3 10/28/2021     10/28/2021    CO2 25.8 10/28/2021    CALCIUM 9.5 10/28/2021    PROTEINTOT 7.0 10/28/2021    ALBUMIN 4.40 10/28/2021    BILITOT 0.4 10/28/2021    ALKPHOS 46 10/28/2021    AST 19 10/28/2021    ALT 27 10/28/2021          Assessment and Plan    Diagnoses and all orders for this visit:    1. Urothelial cancer (HCC) (Primary)    2. Paralysis of right vocal cord  -     Ambulatory Referral to ENT (Otolaryngology)    3. Adnexal mass  -     Cancel: US Abdomen  Complete; Future    4. Malignant neoplasm of upper lobe of right lung (HCC)    5. Vocal cord paralysis    6. Adnexal fullness    7. Thyroid nodule      Urothelial carcinoma of the renal pelvis: No evidence of recurrent disease on recent bladder biopsy.  No evidence of disease recurrence or progression on CT scans.  I will plan to repeat CT CAP with contrast in 3 months and have the patient follow-up after.    Bilateral thyroid nodule: Evaluation per primary care.  Since I am referring her to ENT for other issues also asked the patient to discuss this issue with him as well.    Vocal cord paralysis: Noted on recent imaging.  I will for the patient to ENT for evaluation.    Adnexal fullness: Noted on CT imaging.  I will schedule patient for pelvic ultrasound to better evaluate and follow-up with the patient afterwards to discuss results.    I spent 40 minutes caring for Neda on this date of service. This time includes time spent by me in the following activities: preparing for the visit, reviewing tests, performing a medically appropriate examination and/or evaluation, counseling and educating the patient/family/caregiver, ordering medications, tests, or procedures and referring and communicating with other health care professionals    Patient was given instructions and counseling regarding her condition or for health maintenance advice. Please see specific information pulled into the AVS if appropriate.     Rochelle Frankel MD PhD    11/28/2021

## 2021-11-10 ENCOUNTER — OFFICE VISIT (OUTPATIENT)
Dept: UROLOGY | Facility: CLINIC | Age: 66
End: 2021-11-10

## 2021-11-10 VITALS
DIASTOLIC BLOOD PRESSURE: 77 MMHG | BODY MASS INDEX: 25.88 KG/M2 | HEIGHT: 62 IN | SYSTOLIC BLOOD PRESSURE: 153 MMHG | WEIGHT: 140.6 LBS

## 2021-11-10 DIAGNOSIS — C68.9 UROTHELIAL CANCER (HCC): Primary | ICD-10-CM

## 2021-11-10 LAB
BILIRUB BLD-MCNC: NEGATIVE MG/DL
CLARITY, POC: CLEAR
COLOR UR: YELLOW
EXPIRATION DATE: ABNORMAL
GLUCOSE UR STRIP-MCNC: NEGATIVE MG/DL
KETONES UR QL: NEGATIVE
LEUKOCYTE EST, POC: NEGATIVE
Lab: ABNORMAL
NITRITE UR-MCNC: NEGATIVE MG/ML
PH UR: 5.5 [PH] (ref 5–8)
PROT UR STRIP-MCNC: ABNORMAL MG/DL
RBC # UR STRIP: ABNORMAL /UL
SP GR UR: 1.03 (ref 1–1.03)
UROBILINOGEN UR QL: NORMAL

## 2021-11-10 PROCEDURE — 99213 OFFICE O/P EST LOW 20 MIN: CPT | Performed by: UROLOGY

## 2021-11-10 PROCEDURE — 81003 URINALYSIS AUTO W/O SCOPE: CPT | Performed by: UROLOGY

## 2021-11-10 NOTE — PROGRESS NOTES
"Chief Complaint  Post-op Follow-up (turbt)    Subjective          Neda Núñez presents to [facility] UROLOGY  History of Present Illness    Ms. Núñez follows up today after undergoing a TURBT and retrograde pyelograms. Her retrograde pyelograms were normal on the right. She does not have a left kidney, so no left retrograde pyelogram was performed. She did have a bladder tumor near the bladder neck which I resected. Pathology revealed cystitis cystica. There was no cancer seen. Her original tumor was in her left renal pelvis and she underwent a left nephroureterectomy for high grade urothelial carcinoma in 09/2020. At that time, her stage was a T3. Her cystoscopy in 12/2020 and again in 04/2021 were both normal with no tumor seen. She does follow up with Dr. Frankel.     The patient states that she forgot to disclose a prior injury to Dr. Frankel. In the past, she states that she went through a guardrail while horseback riding, at which time she ruptured her kidney and spleen. She reportedly had a \"tube\" placed to stop the bleeding. She believes she could possibly have scar tissue present secondary to this injury.     Additionally, she reports that she has a thyroid nodule and 1 of her vocal chords is paralyzed. She is currently undergoing a work-up for this.     She inquires about her kidney function, as this was diminished on her last set of labs from her primary care provider. She asks whether this is normal with only having 1 remaining kidney.     Lastly, the patient reports urinary frequency. She states that every time she leaves the house, she often has to run back in to use the bathroom. She inquires whether this could be due to an infection. She denies urinary urgency.     Objective   Vital Signs:   /77   Ht 157.5 cm (62\")   Wt 63.8 kg (140 lb 9.6 oz)   BMI 25.72 kg/m²     Physical Exam  Vitals and nursing note reviewed.   Constitutional:       Appearance: Normal appearance. She " is well-developed.   Pulmonary:      Effort: Pulmonary effort is normal.      Breath sounds: Normal air entry.   Neurological:      Mental Status: She is alert and oriented to person, place, and time.      Motor: Motor function is intact.   Psychiatric:         Mood and Affect: Mood normal.         Behavior: Behavior normal.        Result Review :                  Results for orders placed or performed in visit on 11/10/21   POC Urinalysis Dipstick, Automated    Specimen: Urine   Result Value Ref Range    Color Yellow Yellow, Straw, Dark Yellow, Kathrine    Clarity, UA Clear Clear    Specific Gravity  1.030 1.005 - 1.030    pH, Urine 5.5 5.0 - 8.0    Leukocytes Negative Negative    Nitrite, UA Negative Negative    Protein, POC 30 mg/dL (A) Negative mg/dL    Glucose, UA Negative Negative, 1000 mg/dL (3+) mg/dL    Ketones, UA Negative Negative    Urobilinogen, UA Normal Normal    Bilirubin Negative Negative    Blood, UA Large (A) Negative    Lot Number [id number]     Expiration Date 09/30/22        Assessment and Plan    Diagnoses and all orders for this visit:    1. Urothelial cancer (HCC) (Primary)  -     POC Urinalysis Dipstick, Automated  - We will plan on repeat cystoscopy in 3 months. The patient was advised to let me know if she has any visible blood in her urine or any other issues in the meantime. She will continue to follow up with Dr. Frankel.         Follow Up   Return in about 3 months (around 2/10/2022) for Cystoscopy.  Patient was given instructions and counseling regarding her condition or for health maintenance advice. Please see specific information pulled into the AVS if appropriate.     Transcribed from ambient dictation for Kadie Vu MD by Sabiha Adhikari.  11/10/21   12:01 EST    Patient verbalized consent to the visit recording.  I have personally performed the services described in this document as transcribed by the above individual, and it is both accurate and complete.  Kadie Vu MD   11/10/2021  13:09 EST

## 2021-11-11 ENCOUNTER — HOSPITAL ENCOUNTER (OUTPATIENT)
Dept: ULTRASOUND IMAGING | Facility: HOSPITAL | Age: 66
Discharge: HOME | End: 2021-11-11
Admitting: STUDENT IN AN ORGANIZED HEALTH CARE EDUCATION/TRAINING PROGRAM

## 2021-11-11 PROCEDURE — 76536 US EXAM OF HEAD AND NECK: CPT

## 2021-11-12 ENCOUNTER — TELEPHONE (OUTPATIENT)
Dept: FAMILY MEDICINE CLINIC | Facility: CLINIC | Age: 66
End: 2021-11-12

## 2021-11-12 DIAGNOSIS — E04.1 RIGHT THYROID NODULE: Primary | ICD-10-CM

## 2021-11-12 DIAGNOSIS — E04.2 MULTIPLE THYROID NODULES: Primary | ICD-10-CM

## 2021-11-12 NOTE — TELEPHONE ENCOUNTER
----- Message from Jeff Art MD sent at 11/12/2021  7:07 AM EST -----  Your thyroid ultrasound shows multiple thyroid nodules, right thyroid nodule needs biopsy which I have already ordered so expect a call for thyroid biopsy.  I have also ordered another ultrasound for your thyroid in 1 year

## 2021-11-16 ENCOUNTER — OFFICE VISIT (OUTPATIENT)
Dept: FAMILY MEDICINE CLINIC | Facility: CLINIC | Age: 66
End: 2021-11-16

## 2021-11-16 VITALS
BODY MASS INDEX: 25.69 KG/M2 | DIASTOLIC BLOOD PRESSURE: 82 MMHG | HEIGHT: 62 IN | OXYGEN SATURATION: 97 % | HEART RATE: 100 BPM | SYSTOLIC BLOOD PRESSURE: 124 MMHG | WEIGHT: 139.6 LBS | RESPIRATION RATE: 18 BRPM | TEMPERATURE: 98.2 F

## 2021-11-16 DIAGNOSIS — E04.1 THYROID NODULE: Primary | ICD-10-CM

## 2021-11-16 DIAGNOSIS — H61.21 EXCESSIVE EAR WAX, RIGHT: ICD-10-CM

## 2021-11-16 DIAGNOSIS — C68.9 UROTHELIAL CANCER (HCC): ICD-10-CM

## 2021-11-16 DIAGNOSIS — R35.0 FREQUENT URINATION: ICD-10-CM

## 2021-11-16 DIAGNOSIS — N18.31 STAGE 3A CHRONIC KIDNEY DISEASE (HCC): ICD-10-CM

## 2021-11-16 LAB
BILIRUB BLD-MCNC: NEGATIVE MG/DL
CLARITY, POC: CLEAR
COLOR UR: YELLOW
EXPIRATION DATE: ABNORMAL
GLUCOSE UR STRIP-MCNC: NEGATIVE MG/DL
KETONES UR QL: ABNORMAL
LEUKOCYTE EST, POC: ABNORMAL
Lab: ABNORMAL
NITRITE UR-MCNC: NEGATIVE MG/ML
PH UR: 5.5 [PH] (ref 5–8)
PROT UR STRIP-MCNC: ABNORMAL MG/DL
RBC # UR STRIP: ABNORMAL /UL
SP GR UR: 1.03 (ref 1–1.03)
UROBILINOGEN UR QL: NORMAL

## 2021-11-16 PROCEDURE — 99214 OFFICE O/P EST MOD 30 MIN: CPT | Performed by: STUDENT IN AN ORGANIZED HEALTH CARE EDUCATION/TRAINING PROGRAM

## 2021-11-16 PROCEDURE — 69209 REMOVE IMPACTED EAR WAX UNI: CPT | Performed by: STUDENT IN AN ORGANIZED HEALTH CARE EDUCATION/TRAINING PROGRAM

## 2021-11-16 PROCEDURE — 81003 URINALYSIS AUTO W/O SCOPE: CPT | Performed by: STUDENT IN AN ORGANIZED HEALTH CARE EDUCATION/TRAINING PROGRAM

## 2021-11-16 PROCEDURE — 87086 URINE CULTURE/COLONY COUNT: CPT | Performed by: STUDENT IN AN ORGANIZED HEALTH CARE EDUCATION/TRAINING PROGRAM

## 2021-11-16 NOTE — PROGRESS NOTES
"Chief Complaint  Patient is here to follow-up on thyroid nodules/earwax/frequent urination    Subjective         Neda Núñez is a 66 y.o. female who presents to [facility]  66 years old female with past medical history of thyroid nodule; awaiting for thyroid biopsy, history of urothelial cancer, CKD stable, excessive right earwax comes to the clinic today to follow-up and earwax irrigation.    Right earwax irrigation needed today    Thyroid nodule; awaiting for right thyroid biopsy    Patient is following up with urology, complaining of on and off frequent urination.  Patient denies any other acute complaints at this time    Review of Systems   Objective   Vital Signs:   Vitals:    11/16/21 1413   BP: 124/82   Pulse: 100   Resp: 18   Temp: 98.2 °F (36.8 °C)   SpO2: 97%   Weight: 63.3 kg (139 lb 9.6 oz)   Height: 157.5 cm (62\")      Body mass index is 25.53 kg/m².   Physical Exam  Vitals reviewed.   Constitutional:       Appearance: Normal appearance. She is well-developed.   HENT:      Head: Normocephalic and atraumatic.      Right Ear: External ear normal.      Left Ear: External ear normal.      Ears:      Comments: Clogged right ear, excessive earwax  Irrigation was performed  Unsuccessful irrigation, some wax was removed but still excessive earwax     Mouth/Throat:      Pharynx: No oropharyngeal exudate.   Eyes:      Conjunctiva/sclera: Conjunctivae normal.      Pupils: Pupils are equal, round, and reactive to light.   Cardiovascular:      Rate and Rhythm: Normal rate and regular rhythm.      Heart sounds: No murmur heard.  No friction rub. No gallop.    Pulmonary:      Effort: Pulmonary effort is normal.      Breath sounds: Normal breath sounds. No wheezing or rhonchi.   Abdominal:      General: Bowel sounds are normal. There is no distension.      Palpations: Abdomen is soft.      Tenderness: There is no abdominal tenderness.   Skin:     General: Skin is warm and dry. "   Neurological:      Mental Status: She is alert and oriented to person, place, and time.      Cranial Nerves: No cranial nerve deficit.   Psychiatric:         Mood and Affect: Mood and affect normal.         Behavior: Behavior normal.         Thought Content: Thought content normal.         Judgment: Judgment normal.      Ear Cerumen Removal    Date/Time: 11/16/2021 5:28 PM  Performed by: Jeff Art MD  Authorized by: Jeff Art MD     Anesthesia:  Local Anesthetic: none  Location details: right ear  Patient tolerance: patient tolerated the procedure well with no immediate complications  Comments: Unsuccessful irrigation  Procedure type: irrigation   Sedation:  Patient sedated: no                   Assessment and Plan   Diagnoses and all orders for this visit:    1. Thyroid nodule (Primary)  Comments:  awaiting for biopsy     2. Frequent urination  Comments:  Urine culture needed  Orders:  -     Urine Culture - Urine, Urine, Clean Catch  -     POCT urinalysis dipstick, automated    3. Urothelial cancer (HCC)  Comments:  Continue with urology    4. Stage 3a chronic kidney disease (HCC)  Comments:  Stable, close monitoring needed  Orders:  -     Comprehensive metabolic panel; Future    5. Excessive ear wax, right  Comments:  Irrigation was done; unsuccessful    Other orders  -     Cancel: Fluzone High-Dose 65+yrs ([id number])  -     Ear Cerumen Removal      Patient to use Debrox at home, primary conservative management discussed at home  Flu shot in next visit, patient to call follow-up with us for the irrigation if no improvement      Follow Up   Return in about 1 month (around 12/16/2021) for Medicare Wellness.  Patient was given instructions and counseling regarding her condition or for health maintenance advice. Please see specific information pulled into the AVS if appropriate.

## 2021-11-17 LAB — BACTERIA SPEC AEROBE CULT: NO GROWTH

## 2021-11-18 ENCOUNTER — OFFICE VISIT (OUTPATIENT)
Dept: OTOLARYNGOLOGY | Facility: CLINIC | Age: 66
End: 2021-11-18

## 2021-11-18 VITALS — BODY MASS INDEX: 26.28 KG/M2 | TEMPERATURE: 97.6 F | HEIGHT: 62 IN | WEIGHT: 142.8 LBS

## 2021-11-18 DIAGNOSIS — H61.22 IMPACTED CERUMEN OF LEFT EAR: Primary | ICD-10-CM

## 2021-11-18 DIAGNOSIS — R49.0 VOICE HOARSENESS: ICD-10-CM

## 2021-11-18 DIAGNOSIS — E04.1 THYROID NODULE: ICD-10-CM

## 2021-11-18 DIAGNOSIS — J38.3 VOCAL FOLD ATROPHY: ICD-10-CM

## 2021-11-18 PROCEDURE — 69210 REMOVE IMPACTED EAR WAX UNI: CPT | Performed by: OTOLARYNGOLOGY

## 2021-11-18 PROCEDURE — 99204 OFFICE O/P NEW MOD 45 MIN: CPT | Performed by: OTOLARYNGOLOGY

## 2021-11-18 PROCEDURE — 31575 DIAGNOSTIC LARYNGOSCOPY: CPT | Performed by: OTOLARYNGOLOGY

## 2021-11-18 NOTE — PROGRESS NOTES
Patient Name: Neda Núñez   Visit Date: 11/18/2021   Patient ID: [id number]  Provider: Kvng Fung MD    Sex: female  Location: [facility]   YOB: 1955  Location Address: [address] [place] [address], [address] [place] [address],?[place]?[id number]    Primary Care Provider Jeff Art MD  Location Phone: [phone]    Referring Provider: Rochelle Frankel MD PhD        Chief Complaint  Other (Paralysis of right vocal cord)    Subjective    History of Present Illness  Neda Núñez is a 66 y.o. female who presents to [facility] today as a consult from Rochelle Frankel MD PhD.    She presents the clinic today for evaluation of trauma sustained to the right ear during an ear cleaning that happened several months ago, thyroid nodule, and voice changes with CT scan suggesting possible vocal fold paralysis.  She has a complex history of lung cancer and previously underwent surgery with right-sided lobectomy as well as tracheotomy.  She has recovered well, and has been cancer free for 10 years from her lung cancer.  She recently had a CT scan of her neck suggesting asymmetry of the vocal folds and positioning that may suggest right vocal fold paresis.  She presents for further evaluation of this.  She notes that her voice is not quite as strong as it used to be, and she is a musician.  The changes have been gradual, she has no symptoms of pain and has had no difficulty with swallowing.    They did discover thyroid nodules and an ultrasound was recently performed revealing a 1.6 cm right-sided hypervascular T RADS 4 lower thyroid nodule.  She is already scheduled to have a fine-needle aspiration biopsy of this.    As far as her ears are concerned, she had an ear wax cleaning performed at an urgent care center and unfortunately did traumatize her eardrum.  She had extensive bleeding from the ear canal, and notes that she not able to hear out of that ear for several  "weeks.  She notes that her hearing is improving.  She continues to have fullness in the right side.  She has been using Debrox for this.    Past Medical History:   Diagnosis Date   • Allergies    • Ear problem    • Lung cancer (HCC)    • Reflux esophagitis    • Ureteral mass    • Urothelial cancer (HCC) 2020    (Left)   • Vocal cord dysfunction        Past Surgical History:   Procedure Laterality Date   • BACK SURGERY      LUMBAR   •  SECTION     • CYSTOSCOPY URETERAL BIOPSY     • CYSTOSCOPY, URETEROSCOPY, RETROGRADE PYELOGRAM, STENT INSERTION     • KIDNEY SURGERY     • LEG SURGERY      ignacio and pins    • LUNG SURGERY Right     upper lobe removed   • NEPHRECTOMY Left    • TRANSURETHRAL RESECTION OF BLADDER TUMOR N/A 2021    Procedure: CYSTOSCOPY TRANSURETHRAL RESECTION OF BLADDER TUMOR; CYSTOSCOPY RETROGRADE PYELOGRAM right;  Surgeon: Kadie Vu MD;  Location: [facility];  Service: Urology;  Laterality: N/A;       No current outpatient medications on file.     Allergies   Allergen Reactions   • Penicillins Unknown - High Severity   • Sulfa Antibiotics Unknown - High Severity       Family History   Problem Relation Age of Onset   • Diabetes Brother         Unspecified Type   • Diabetes Maternal Grandfather         Unspecified Type   • Breast cancer Paternal Grandmother    • Cancer Other         Unspecified   • Migraines Other         Migraine Headaches   • Lung cancer Other         Social History     Social History Narrative   • Not on file       Objective     Vital Signs:   Temp 97.6 °F (36.4 °C) (Temporal)   Ht 157.5 cm (62\")   Wt 64.8 kg (142 lb 12.8 oz)   BMI 26.12 kg/m²       Physical Exam    Ear Cerumen Removal    Date/Time: 2021 2:55 PM  Performed by: Kvng Fung MD  Authorized by: Kvng Fung MD     Anesthesia:  Local Anesthetic: none  Location details: right ear  Comments: Blood clot solidified in the right ear canal, unable to visualize eardrum, " partially removed today.  Unable to move completely due to patient discomfort and position of cerumen and clot.  Procedure type: instrumentation, curved hook, curette, alligator forceps   Sedation:  Patient sedated: no      Flexible laryngoscopy    Date/Time: 11/18/2021 2:56 PM  Performed by: Kvng Fung MD  Authorized by: Kvng Fung MD     Procedure details:     Indications: evaluation of larynx and hoarseness, dysphagia, or aspiration      Medication:  Afrin    Instrument: flexible fiberoptic laryngoscope      Scope location: right nare    Comments:      The nasal cavities free of any lesions, polyps, or purulence.  Vocal folds are bilaterally mobile and symmetric with no lesions.  There is significant vocal fold thinning, likely age-related.        Constitutional   Appearance  · : well developed, well-nourished, alert and in no acute distress, voice clear and strong    Head  Inspection  · : no deformities or lesions  Face  Inspection  · : No facial lesions; House-Brackmann I/VI bilaterally  Palpation  · : No TMJ crepitus nor  muscle tenderness bilaterally    Eyes  Vision  Visual Fields  · : Extraocular movements are intact. No spontaneous or gaze-induced nystagmus.  Conjunctivae  · : clear  Sclerae  · : clear  Pupils and Irises  · : pupils equal, round, and reactive to light.     Ears, Nose, Mouth and Throat    Ears    External Ears  · : appearance within normal limits, no lesions present  Otoscopic Examination  · : Tympanic membrane appearance within normal limits on the left without perforations, well-aerated middle ear.  Right ear with complete opacification with cerumen and blood clot, partially removed today using curettes and the operating microscope as described above.  Hearing  · : intact to conversational voice both ears  Tunning fork testing:     :    Nose    External Nose  · : appearance normal  Intranasal Exam  · : mucosa within normal limits, vestibules normal, no  intranasal lesions present, septum midline, sinuses non tender to percussion  Oral Cavity    Oral Mucosa  · : oral mucosa normal without pallor or cyanosis  Lips  · : lip appearance normal  Teeth  · : normal dentition for age  Gums  · : gums pink, non-swollen, no bleeding present  Tongue  · : tongue appearance normal; normal mobility  Palate  · : hard palate normal, soft palate appearance normal with symmetric mobility    Throat    Oropharynx  · : no inflammation or lesions present, tonsils within normal limits  Hypopharynx  · : appearance within normal limits, superior epiglottis within normal limits  Larynx  · : appearance within normal limits, vocal cords within normal limits, no lesions present    Neck  Inspection/Palpation  · : normal appearance, no masses or tenderness, trachea midline; thyroid size normal, nontender, no nodules or masses present on palpation    Respiratory  Respiratory Effort  · : breathing unlabored  Inspection of Chest  · : normal appearance, no retractions    Cardiovascular  Heart  · : regular rate and rhythm    Lymphatic  Neck  · : no lymphadenopathy present  Supraclavicular Nodes  · : no lymphadenopathy present  Preauricular Nodes  · : no lymphadenopathy present    Skin and Subcutaneous Tissue  General Inspection  · : Regarding face and neck - there are no rashes present, no lesions present, and no areas of discoloration    Neurologic  Cranial Nerves  · : cranial nerves II-XII are grossly intact bilaterally  Gait and Station  · : normal gait, able to stand without diffculty    Psychiatric  Judgement and Insight  · : judgment and insight intact  Mood and Affect  · : mood normal, affect appropriate          Assessment and Plan    Diagnoses and all orders for this visit:    1. Impacted cerumen of left ear (Primary)    2. Thyroid nodule    3. Voice hoarseness    4. Vocal fold atrophy    Other orders  -     Ear Cerumen Removal  -     $ Laryngoscopy    Examination today revealed complete  impaction of the right ear canal.  I was able to partially remove some of the earwax and blood clot, but due to discomfort in the positioning of the blood clot is not able to fully remove this.  I cannot fully visualize the eardrum.  I see no evidence of infection.  Nasolaryngoscopy was performed and did reveal vocal fold thinning but no asymmetry and normal vocal fold mobility bilaterally.  She was relieved to hear this.  I will plan on seeing her back in the clinic after the biopsy of her thyroid nodule and will hopefully be able to completely clear the right ear canal that point.  Have asked her to contact me as her biopsy is not yet scheduled I will see her back 1 week later.    Follow Up   No follow-ups on file.  Patient was given instructions and counseling regarding her condition or for health maintenance advice. Please see specific information pulled into the AVS if appropriate.

## 2021-11-22 ENCOUNTER — HOSPITAL ENCOUNTER (OUTPATIENT)
Dept: ULTRASOUND IMAGING | Facility: HOSPITAL | Age: 66
Discharge: HOME | End: 2021-11-22

## 2021-11-22 DIAGNOSIS — N94.89 ADNEXAL MASS: ICD-10-CM

## 2021-11-22 DIAGNOSIS — N94.89 ADNEXAL MASS: Primary | ICD-10-CM

## 2021-11-22 PROCEDURE — 76856 US EXAM PELVIC COMPLETE: CPT

## 2021-11-28 PROBLEM — J38.00 VOCAL CORD PARALYSIS: Status: ACTIVE | Noted: 2021-11-28

## 2021-11-28 PROBLEM — N94.9 ADNEXAL FULLNESS: Status: ACTIVE | Noted: 2021-11-28

## 2021-11-28 PROBLEM — E04.1 THYROID NODULE: Status: ACTIVE | Noted: 2021-11-28

## 2021-11-30 ENCOUNTER — APPOINTMENT (OUTPATIENT)
Dept: ULTRASOUND IMAGING | Facility: HOSPITAL | Age: 66
End: 2021-11-30

## 2021-12-06 ENCOUNTER — APPOINTMENT (OUTPATIENT)
Dept: MAMMOGRAPHY | Facility: HOSPITAL | Age: 66
End: 2021-12-06

## 2021-12-06 ENCOUNTER — HOSPITAL ENCOUNTER (OUTPATIENT)
Dept: MAMMOGRAPHY | Facility: HOSPITAL | Age: 66
Discharge: HOME | End: 2021-12-06

## 2021-12-06 ENCOUNTER — APPOINTMENT (OUTPATIENT)
Dept: BONE DENSITY | Facility: HOSPITAL | Age: 66
End: 2021-12-06

## 2021-12-06 ENCOUNTER — HOSPITAL ENCOUNTER (OUTPATIENT)
Dept: BONE DENSITY | Facility: HOSPITAL | Age: 66
Discharge: HOME | End: 2021-12-06

## 2021-12-06 ENCOUNTER — APPOINTMENT (OUTPATIENT)
Dept: ONCOLOGY | Facility: HOSPITAL | Age: 66
End: 2021-12-06

## 2021-12-06 DIAGNOSIS — Z12.31 ENCOUNTER FOR SCREENING MAMMOGRAM FOR MALIGNANT NEOPLASM OF BREAST: ICD-10-CM

## 2021-12-06 DIAGNOSIS — Z78.0 POSTMENOPAUSE: ICD-10-CM

## 2021-12-06 PROCEDURE — 77080 DXA BONE DENSITY AXIAL: CPT

## 2021-12-06 PROCEDURE — 77063 BREAST TOMOSYNTHESIS BI: CPT

## 2021-12-06 PROCEDURE — 77067 SCR MAMMO BI INCL CAD: CPT

## 2021-12-09 ENCOUNTER — HOSPITAL ENCOUNTER (OUTPATIENT)
Dept: ULTRASOUND IMAGING | Facility: HOSPITAL | Age: 66
Discharge: HOME | End: 2021-12-09
Admitting: STUDENT IN AN ORGANIZED HEALTH CARE EDUCATION/TRAINING PROGRAM

## 2021-12-09 DIAGNOSIS — E04.1 THYROID NODULE: ICD-10-CM

## 2021-12-09 PROCEDURE — 88173 CYTOPATH EVAL FNA REPORT: CPT | Performed by: STUDENT IN AN ORGANIZED HEALTH CARE EDUCATION/TRAINING PROGRAM

## 2021-12-09 PROCEDURE — 0 LIDOCAINE 1 % SOLUTION: Performed by: STUDENT IN AN ORGANIZED HEALTH CARE EDUCATION/TRAINING PROGRAM

## 2021-12-09 PROCEDURE — 76942 ECHO GUIDE FOR BIOPSY: CPT

## 2021-12-09 RX ORDER — LIDOCAINE HYDROCHLORIDE 10 MG/ML
10 INJECTION, SOLUTION INFILTRATION; PERINEURAL ONCE
Status: COMPLETED | OUTPATIENT
Start: 2021-12-09 | End: 2021-12-09

## 2021-12-09 RX ADMIN — LIDOCAINE HYDROCHLORIDE 10 ML: 10 INJECTION, SOLUTION INFILTRATION; PERINEURAL at 10:40

## 2021-12-13 LAB
CYTO UR: NORMAL
LAB AP CASE REPORT: NORMAL
LAB AP CLINICAL INFORMATION: NORMAL
LAB AP NON-GYN SPECIMEN ADEQUACY: NORMAL
PATH REPORT.FINAL DX SPEC: NORMAL
PATH REPORT.GROSS SPEC: NORMAL

## 2021-12-14 DIAGNOSIS — E04.1 THYROID NODULE: Primary | ICD-10-CM

## 2021-12-14 DIAGNOSIS — Z98.890 S/P THYROID BIOPSY: ICD-10-CM

## 2021-12-15 DIAGNOSIS — R93.89 ABNORMAL ULTRASOUND OF PELVIS: Primary | ICD-10-CM

## 2022-01-12 DIAGNOSIS — C68.9 UROTHELIAL CANCER: Primary | ICD-10-CM

## 2022-01-13 ENCOUNTER — OFFICE VISIT (OUTPATIENT)
Dept: OBSTETRICS AND GYNECOLOGY | Facility: CLINIC | Age: 67
End: 2022-01-13

## 2022-01-13 VITALS
SYSTOLIC BLOOD PRESSURE: 133 MMHG | HEART RATE: 90 BPM | DIASTOLIC BLOOD PRESSURE: 88 MMHG | HEIGHT: 63 IN | BODY MASS INDEX: 24.45 KG/M2 | WEIGHT: 138 LBS

## 2022-01-13 DIAGNOSIS — R93.89 ABNORMAL ULTRASOUND OF PELVIS: ICD-10-CM

## 2022-01-13 DIAGNOSIS — Z12.4 PAPANICOLAOU SMEAR: Primary | ICD-10-CM

## 2022-01-13 PROCEDURE — 88305 TISSUE EXAM BY PATHOLOGIST: CPT | Performed by: STUDENT IN AN ORGANIZED HEALTH CARE EDUCATION/TRAINING PROGRAM

## 2022-01-13 PROCEDURE — G0123 SCREEN CERV/VAG THIN LAYER: HCPCS | Performed by: STUDENT IN AN ORGANIZED HEALTH CARE EDUCATION/TRAINING PROGRAM

## 2022-01-13 PROCEDURE — 58100 BIOPSY OF UTERUS LINING: CPT | Performed by: STUDENT IN AN ORGANIZED HEALTH CARE EDUCATION/TRAINING PROGRAM

## 2022-01-13 PROCEDURE — 99203 OFFICE O/P NEW LOW 30 MIN: CPT | Performed by: STUDENT IN AN ORGANIZED HEALTH CARE EDUCATION/TRAINING PROGRAM

## 2022-01-13 NOTE — PROGRESS NOTES
"GYN Problem/Follow Up Visit    Chief Complaint   Patient presents with   • Thickened endo           HPI  Neda Núñez is a 66 y.o. female, , who presents for evaluation of thickened endometrial lining noted on full body scanning for history of lung cancer and kidney cancer.  She denies any vaginal bleeding, any vaginal discharge, any the vaginal irritation.  She is no longer sexually active.  She went through menopause many years ago.  No concerns for bleeding.  She is not taking medications currently.  Otherwise feels well.    Additional OB/GYN History   No LMP recorded. Patient is postmenopausal.  Current contraception: contraceptive methods: Postmenopausal  Allergies : Penicillins and Sulfa antibiotics     The additional following portions of the patient's history were reviewed and updated as appropriate: allergies, current medications, past family history, past medical history, past social history, past surgical history and problem list.    Review of Systems   Constitutional: Negative for fatigue and fever.   Respiratory: Negative for cough and shortness of breath.    Cardiovascular: Negative for chest pain.   Gastrointestinal: Negative for abdominal distention and abdominal pain.   Genitourinary: Negative for difficulty urinating and dysuria.       I have reviewed and agree with the HPI, ROS, and historical information as entered above. Jd Beaulieu MD    Objective   /88   Pulse 90   Ht 160 cm (63\")   Wt 62.6 kg (138 lb)   BMI 24.45 kg/m²       Physical Exam  Vitals and nursing note reviewed. Exam conducted with a chaperone present.   Constitutional:       General: She is not in acute distress.     Appearance: Normal appearance. She is not toxic-appearing.   HENT:      Head: Normocephalic and atraumatic.   Eyes:      Extraocular Movements: Extraocular movements intact.      Conjunctiva/sclera: Conjunctivae normal.   Cardiovascular:      Pulses: Normal pulses.   Pulmonary:      Effort: " Pulmonary effort is normal.   Abdominal:      General: There is no distension.      Palpations: Abdomen is soft.      Tenderness: There is no abdominal tenderness.   Genitourinary:     General: Normal vulva.      Exam position: Lithotomy position.      Labia:         Right: No tenderness, lesion or injury.         Left: No tenderness or injury.       Vagina: Normal.      Cervix: Normal.      Uterus: Normal.       Adnexa: Right adnexa normal and left adnexa normal.        Right: No mass or tenderness.          Left: No mass or tenderness.     Musculoskeletal:      Cervical back: Normal range of motion.   Skin:     General: Skin is warm and dry.   Neurological:      Mental Status: She is alert and oriented to person, place, and time.   Psychiatric:         Mood and Affect: Mood normal.         Behavior: Behavior normal.         Thought Content: Thought content normal.       Endometrial Biopsy    Date/Time: 2022 3:38 PM  Performed by: Jd Beaulieu MD  Authorized by: Jd Beaulieu MD   Preparation: Patient was prepped and draped in the usual sterile fashion.  Local anesthesia used: no    Anesthesia:  Local anesthesia used: no    Sedation:  Patient sedated: no    Patient tolerance: patient tolerated the procedure well with no immediate complications  Comments: After consent and with chaperone present, sterile speculum was placed into the vagina.  The cervix was visualized and cleared with Betadine solution x3.  At single-tooth tenaculum and then was used to grab the anterior lip of the cervix.  A Pipelle was then placed through the os into the endometrial cavity and sounded to 7.  A global sampling was taken.  This was sent for pathology.  The single-tooth tenaculum was removed from the anterior lip and hemostasis was observed.  Sterile speculum was removed from the vagina.  Patient tolerated the procedure well.           Assessment and Plan  Neda Núñez is a 66 y.o.  with incidentally found  thickened endometrial lining on imaging.  Review of transvaginal ultrasound showing lining to be 7 mm in thickness.  No vaginal bleeding.  Given patient history of kidney and lung cancer will biopsy today.  Will call patient with results    Diagnoses and all orders for this visit:    1. Papanicolaou smear (Primary)  -     IGP,rfx Aptima HPV All Pth    2. Abnormal ultrasound of pelvis  -     Endometrial Biopsy  -     Tissue Pathology Exam        Counseling:  • Patient was counseled to abnormal bleeding requiring intervention in the emergency room and infection precautions      She understands the importance of having the above orders performed in a timely fashion.  The risks of not performing them include, but are not limited to, cancer and/or subsequent increase in morbidity and/or mortality.  She is encouraged to review her results online and/or contact or office if she has questions.     Follow Up:  Return in about 1 year (around 1/13/2023), or if symptoms worsen or fail to improve, for Annual physical.      Jd Beaulieu MD  01/13/2022

## 2022-01-14 ENCOUNTER — APPOINTMENT (OUTPATIENT)
Dept: ONCOLOGY | Facility: HOSPITAL | Age: 67
End: 2022-01-14

## 2022-01-18 LAB
CONV .: NORMAL
CYTOLOGIST CVX/VAG CYTO: NORMAL
CYTOLOGY CVX/VAG DOC CYTO: NORMAL
CYTOLOGY CVX/VAG DOC THIN PREP: NORMAL
DX ICD CODE: NORMAL
HIV 1 & 2 AB SER-IMP: NORMAL
OTHER STN SPEC: NORMAL
STAT OF ADQ CVX/VAG CYTO-IMP: NORMAL

## 2022-01-19 ENCOUNTER — TELEPHONE (OUTPATIENT)
Dept: OBSTETRICS AND GYNECOLOGY | Facility: CLINIC | Age: 67
End: 2022-01-19

## 2022-01-19 NOTE — TELEPHONE ENCOUNTER
Patient contacted in regards to normal Pap smear and inactive endometrium suggestive of atrophic endometrium.  This is no signs of malignancy.  Patient was instructed to follow-up in 1 year with annual visit.  As well as to represent if she has any concerns for any new vaginal bleeding.    Jd Beaulieu MD

## 2022-01-20 ENCOUNTER — OFFICE VISIT (OUTPATIENT)
Dept: OTOLARYNGOLOGY | Facility: CLINIC | Age: 67
End: 2022-01-20

## 2022-01-20 VITALS — HEIGHT: 62 IN | WEIGHT: 146 LBS | TEMPERATURE: 97.4 F | BODY MASS INDEX: 26.87 KG/M2

## 2022-01-20 DIAGNOSIS — E04.1 THYROID NODULE: Primary | ICD-10-CM

## 2022-01-20 DIAGNOSIS — H61.21 IMPACTED CERUMEN OF RIGHT EAR: ICD-10-CM

## 2022-01-20 PROBLEM — J38.00 VOCAL CORD PARALYSIS: Status: RESOLVED | Noted: 2021-11-28 | Resolved: 2022-01-20

## 2022-01-20 PROCEDURE — 99213 OFFICE O/P EST LOW 20 MIN: CPT | Performed by: OTOLARYNGOLOGY

## 2022-01-20 PROCEDURE — 69210 REMOVE IMPACTED EAR WAX UNI: CPT | Performed by: OTOLARYNGOLOGY

## 2022-01-20 NOTE — PROGRESS NOTES
Patient Name: Neda Núñez   Visit Date: 2022   Patient ID: [id number]  Provider: Kvng Fung MD    Sex: female  Location: [facility]   YOB: 1955  Location Address: [address] [place] [address], [address],?[place]?[address]    Primary Care Provider Jeff Art MD  Location Phone: [phone]    Referring Provider: No ref. provider found        Chief Complaint  Follow-up (f/u thyroid biopsy wax impaction)    Subjective    History of Present Illness  Neda Núñez is a 66 y.o. female who presents to [facility] today as a consult from No ref. provider found.    She presents the clinic today for follow-up regarding thyroid nodule which was recently biopsied, as well as cerumen impaction on the right side from previous history of ear trauma and bleeding.  She has been doing well overall, and biopsy of her right-sided thyroid nodule reveals this to be a colloid nodule, benign.  She is doing well as far as her ears are concerned, denies any ear pain.  She does note decreased hearing on the right side, and still feels like there is something in her ear.  At the last clinic visit if not able to remove a large dried clot due to discomfort.    Past Medical History:   Diagnosis Date   • Allergies    • Ear problem    • Lung cancer (HCC)    • Reflux esophagitis    • Ureteral mass    • Urothelial cancer (HCC) 2020    (Left)   • Vocal cord dysfunction    • Wax in ear        Past Surgical History:   Procedure Laterality Date   • BACK SURGERY      LUMBAR   •  SECTION     • CYSTOSCOPY URETERAL BIOPSY     • CYSTOSCOPY, URETEROSCOPY, RETROGRADE PYELOGRAM, STENT INSERTION     • KIDNEY SURGERY     • LEG SURGERY      ignacio and pins    • LUNG SURGERY Right     upper lobe removed   • NEPHRECTOMY Left    • TRANSURETHRAL RESECTION OF BLADDER TUMOR N/A 2021    Procedure: CYSTOSCOPY TRANSURETHRAL RESECTION OF BLADDER TUMOR; CYSTOSCOPY RETROGRADE  "PYELOGRAM right;  Surgeon: Kadie Vu MD;  Location: [facility];  Service: Urology;  Laterality: N/A;       No current outpatient medications on file.     Allergies   Allergen Reactions   • Penicillins Unknown - High Severity   • Sulfa Antibiotics Unknown - High Severity       Family History   Problem Relation Age of Onset   • Diabetes Brother         Unspecified Type   • Diabetes Maternal Grandfather         Unspecified Type   • Breast cancer Paternal Grandmother    • Cancer Other         Unspecified   • Migraines Other         Migraine Headaches   • Lung cancer Other         Social History     Social History Narrative   • Not on file       Objective     Vital Signs:   Temp 97.4 °F (36.3 °C) (Temporal)   Ht 157.5 cm (62\")   Wt 66.2 kg (146 lb)   BMI 26.70 kg/m²       Physical Exam    Ear Cerumen Removal    Date/Time: 1/20/2022 3:54 PM  Performed by: Kvng Fung MD  Authorized by: Kvng Fung MD     Anesthesia:  Local Anesthetic: none  Location details: right ear  Procedure type: instrumentation, curette, curved hook   Sedation:  Patient sedated: no            Constitutional   Appearance  · : well developed, well-nourished, alert and in no acute distress, voice clear and strong    Head  Inspection  · : no deformities or lesions  Face  Inspection  · : No facial lesions; House-Brackmann I/VI bilaterally  Palpation  · : No TMJ crepitus nor  muscle tenderness bilaterally    Eyes  Vision  Visual Fields  · : Extraocular movements are intact. No spontaneous or gaze-induced nystagmus.  Conjunctivae  · : clear  Sclerae  · : clear  Pupils and Irises  · : pupils equal, round, and reactive to light.     Ears, Nose, Mouth and Throat    Ears    External Ears  · : appearance within normal limits, no lesions present  Otoscopic Examination  · : Tympanic membrane appearance within normal limits bilaterally without perforations, well-aerated middle ears  Hearing  · : intact to conversational " voice both ears  Tunning fork testing:     :    Nose    External Nose  · : appearance normal  Intranasal Exam  · : mucosa within normal limits, vestibules normal, no intranasal lesions present, septum midline, sinuses non tender to percussion  Oral Cavity    Oral Mucosa  · : oral mucosa normal without pallor or cyanosis  Lips  · : lip appearance normal  Teeth  · : normal dentition for age  Gums  · : gums pink, non-swollen, no bleeding present  Tongue  · : tongue appearance normal; normal mobility  Palate  · : hard palate normal, soft palate appearance normal with symmetric mobility    Throat    Oropharynx  · : no inflammation or lesions present, tonsils within normal limits  Hypopharynx  · : appearance within normal limits, superior epiglottis within normal limits  Larynx  · : appearance within normal limits, vocal cords within normal limits, no lesions present    Neck  Inspection/Palpation  · : normal appearance, no masses or tenderness, trachea midline; thyroid size normal, nontender, no nodules or masses present on palpation    Respiratory  Respiratory Effort  · : breathing unlabored  Inspection of Chest  · : normal appearance, no retractions    Cardiovascular  Heart  · : regular rate and rhythm    Lymphatic  Neck  · : no lymphadenopathy present  Supraclavicular Nodes  · : no lymphadenopathy present  Preauricular Nodes  · : no lymphadenopathy present    Skin and Subcutaneous Tissue  General Inspection  · : Regarding face and neck - there are no rashes present, no lesions present, and no areas of discoloration    Neurologic  Cranial Nerves  · : cranial nerves II-XII are grossly intact bilaterally  Gait and Station  · : normal gait, able to stand without diffculty    Psychiatric  Judgement and Insight  · : judgment and insight intact  Mood and Affect  · : mood normal, affect appropriate          Assessment and Plan    Diagnoses and all orders for this visit:    1. Thyroid nodule (Primary)    2. Impacted cerumen of  right ear    Examination of the neck was normal, and thyroid nodule is stable in size.  I discussed the results with her and recommended no further intervention unless there are changes in size.  I was able to remove a large dried clot out of the right ear canal, and she noted improvement in her hearing.  Eardrum was completely healed and night saw no residual evidence of trauma.  I will see her back on an as-needed basis should there be any further issues.    Follow Up   No follow-ups on file.  Patient was given instructions and counseling regarding her condition or for health maintenance advice. Please see specific information pulled into the AVS if appropriate.

## 2022-01-21 ENCOUNTER — OFFICE VISIT (OUTPATIENT)
Dept: FAMILY MEDICINE CLINIC | Facility: CLINIC | Age: 67
End: 2022-01-21

## 2022-01-21 VITALS
BODY MASS INDEX: 26.39 KG/M2 | HEIGHT: 62 IN | OXYGEN SATURATION: 98 % | RESPIRATION RATE: 18 BRPM | DIASTOLIC BLOOD PRESSURE: 80 MMHG | TEMPERATURE: 97.8 F | WEIGHT: 143.4 LBS | SYSTOLIC BLOOD PRESSURE: 142 MMHG | HEART RATE: 77 BPM

## 2022-01-21 DIAGNOSIS — E04.1 THYROID NODULE: Primary | ICD-10-CM

## 2022-01-21 DIAGNOSIS — R73.01 IMPAIRED FASTING BLOOD SUGAR: ICD-10-CM

## 2022-01-21 DIAGNOSIS — C68.9 UROTHELIAL CANCER: ICD-10-CM

## 2022-01-21 DIAGNOSIS — Z23 FLU VACCINE NEED: ICD-10-CM

## 2022-01-21 DIAGNOSIS — Z90.5 H/O LEFT NEPHRECTOMY: ICD-10-CM

## 2022-01-21 DIAGNOSIS — C34.11 MALIGNANT NEOPLASM OF UPPER LOBE OF RIGHT LUNG: ICD-10-CM

## 2022-01-21 DIAGNOSIS — Z98.890 S/P THYROID BIOPSY: ICD-10-CM

## 2022-01-21 DIAGNOSIS — J43.9 PULMONARY EMPHYSEMA, UNSPECIFIED EMPHYSEMA TYPE: ICD-10-CM

## 2022-01-21 DIAGNOSIS — M85.851 OSTEOPENIA OF BOTH HIPS: ICD-10-CM

## 2022-01-21 DIAGNOSIS — E78.2 MIXED HYPERLIPIDEMIA: ICD-10-CM

## 2022-01-21 DIAGNOSIS — M85.852 OSTEOPENIA OF BOTH HIPS: ICD-10-CM

## 2022-01-21 PROCEDURE — G0008 ADMIN INFLUENZA VIRUS VAC: HCPCS | Performed by: STUDENT IN AN ORGANIZED HEALTH CARE EDUCATION/TRAINING PROGRAM

## 2022-01-21 PROCEDURE — 90662 IIV NO PRSV INCREASED AG IM: CPT | Performed by: STUDENT IN AN ORGANIZED HEALTH CARE EDUCATION/TRAINING PROGRAM

## 2022-01-21 PROCEDURE — 99214 OFFICE O/P EST MOD 30 MIN: CPT | Performed by: STUDENT IN AN ORGANIZED HEALTH CARE EDUCATION/TRAINING PROGRAM

## 2022-01-21 NOTE — PROGRESS NOTES
"Chief Complaint  Following up on history of bladder cancer/kidney cancer/lung cancer, thyroid nodules and hyperlipidemia    Subjective         Neda Núñez is a 66 y.o. female who presents to [facility]  66 years old female with past medical history of bladder cancer/kidney cancer/lung cancer, thyroid nodules with normal biopsy, endometrial thickening with normal biopsy comes to the clinic today to follow-up on chronic conditions.    Urologist recommendations reviewed, OB/GYN recommendations and ENT recommendations reviewed.    Thyroid nodule; benign finding, continue with ENT    Patient reports no acute symptoms at this time.    Patient is physically very active without any chest pain or shortness of breath on exertion.    Review of Systems   Objective   Vital Signs:   Vitals:    01/21/22 1607   BP: 142/80   Pulse: 77   Resp: 18   Temp: 97.8 °F (36.6 °C)   SpO2: 98%   Weight: 65 kg (143 lb 6.4 oz)   Height: 157.5 cm (62\")      Body mass index is 26.23 kg/m².   Physical Exam  Vitals reviewed.   Constitutional:       Appearance: Normal appearance. She is well-developed.   HENT:      Head: Normocephalic and atraumatic.      Right Ear: External ear normal.      Left Ear: External ear normal.      Mouth/Throat:      Pharynx: No oropharyngeal exudate.   Eyes:      Conjunctiva/sclera: Conjunctivae normal.      Pupils: Pupils are equal, round, and reactive to light.   Cardiovascular:      Rate and Rhythm: Normal rate and regular rhythm.      Heart sounds: No murmur heard.  No friction rub. No gallop.    Pulmonary:      Effort: Pulmonary effort is normal.      Breath sounds: Normal breath sounds. No wheezing or rhonchi.   Abdominal:      General: Bowel sounds are normal. There is no distension.      Palpations: Abdomen is soft.      Tenderness: There is no abdominal tenderness.   Skin:     General: Skin is warm and dry.   Neurological:      Mental Status: She is alert and oriented to " person, place, and time.      Cranial Nerves: No cranial nerve deficit.   Psychiatric:         Mood and Affect: Mood and affect normal.         Behavior: Behavior normal.         Thought Content: Thought content normal.         Judgment: Judgment normal.                       Assessment and Plan   Diagnoses and all orders for this visit:    1. Thyroid nodule (Primary)  Comments:  benign biopsy, pending thyroid u/s for future   Orders:  -     TSH Rfx On Abnormal To Free T4; Future  -     CBC & Differential; Future  -     Comprehensive Metabolic Panel; Future  -     Hemoglobin A1c; Future  -     Lipid Panel; Future    2. S/P thyroid biopsy  Comments:  normal biopsy : C/w ENT  Orders:  -     TSH Rfx On Abnormal To Free T4; Future  -     CBC & Differential; Future  -     Comprehensive Metabolic Panel; Future  -     Hemoglobin A1c; Future  -     Lipid Panel; Future    3. Urothelial cancer (HCC)  Comments:  C/w urology. C/w HemOnc   Orders:  -     TSH Rfx On Abnormal To Free T4; Future  -     CBC & Differential; Future  -     Comprehensive Metabolic Panel; Future  -     Hemoglobin A1c; Future  -     Lipid Panel; Future    4. Malignant neoplasm of upper lobe of right lung (HCC)  Comments:   C/w HemOnc   Orders:  -     TSH Rfx On Abnormal To Free T4; Future  -     CBC & Differential; Future  -     Comprehensive Metabolic Panel; Future  -     Hemoglobin A1c; Future  -     Lipid Panel; Future    5. Flu vaccine need  Comments:  given today   Orders:  -     Fluzone High-Dose 65+yrs ([id number])  -     TSH Rfx On Abnormal To Free T4; Future  -     CBC & Differential; Future  -     Comprehensive Metabolic Panel; Future  -     Hemoglobin A1c; Future  -     Lipid Panel; Future    6. H/O left nephrectomy  -     TSH Rfx On Abnormal To Free T4; Future  -     CBC & Differential; Future  -     Comprehensive Metabolic Panel; Future  -     Hemoglobin A1c; Future  -     Lipid Panel; Future    7. Pulmonary emphysema, unspecified emphysema  type (HCC)  -     TSH Rfx On Abnormal To Free T4; Future  -     CBC & Differential; Future  -     Comprehensive Metabolic Panel; Future  -     Hemoglobin A1c; Future  -     Lipid Panel; Future    8. Osteopenia of both hips  Comments:  Continue with vitamin D and calcium  Orders:  -     TSH Rfx On Abnormal To Free T4; Future  -     CBC & Differential; Future  -     Comprehensive Metabolic Panel; Future  -     Hemoglobin A1c; Future  -     Lipid Panel; Future    9. Mixed hyperlipidemia  Comments:  Healthy diet and daily exercise recommended  Orders:  -     TSH Rfx On Abnormal To Free T4; Future  -     CBC & Differential; Future  -     Comprehensive Metabolic Panel; Future  -     Hemoglobin A1c; Future  -     Lipid Panel; Future    10. Impaired fasting blood sugar  -     Hemoglobin A1c; Future  -     Lipid Panel; Future      ENT/urology/OB/GYN recommendations reviewed  Recent imaging work-up/thyroid biopsy reviewed      Follow Up   Return in 4 months (on 5/21/2022) for Initial Medicare Wellness.  Patient was given instructions and counseling regarding her condition or for health maintenance advice. Please see specific information pulled into the AVS if appropriate.

## 2022-01-24 ENCOUNTER — OFFICE VISIT (OUTPATIENT)
Dept: UROLOGY | Facility: CLINIC | Age: 67
End: 2022-01-24

## 2022-01-24 VITALS
HEIGHT: 62 IN | WEIGHT: 140 LBS | BODY MASS INDEX: 25.76 KG/M2 | DIASTOLIC BLOOD PRESSURE: 81 MMHG | SYSTOLIC BLOOD PRESSURE: 147 MMHG

## 2022-01-24 DIAGNOSIS — C68.9 UROTHELIAL CANCER: Primary | ICD-10-CM

## 2022-01-24 LAB
BILIRUB BLD-MCNC: NEGATIVE MG/DL
CLARITY, POC: CLEAR
COLOR UR: YELLOW
EXPIRATION DATE: ABNORMAL
GLUCOSE UR STRIP-MCNC: NEGATIVE MG/DL
KETONES UR QL: NEGATIVE
LEUKOCYTE EST, POC: ABNORMAL
Lab: ABNORMAL
NITRITE UR-MCNC: NEGATIVE MG/ML
PH UR: 5.5 [PH] (ref 5–8)
PROT UR STRIP-MCNC: NEGATIVE MG/DL
RBC # UR STRIP: NEGATIVE /UL
SP GR UR: 1.01 (ref 1–1.03)
UROBILINOGEN UR QL: NORMAL

## 2022-01-24 PROCEDURE — 81003 URINALYSIS AUTO W/O SCOPE: CPT | Performed by: UROLOGY

## 2022-01-24 PROCEDURE — 52000 CYSTOURETHROSCOPY: CPT | Performed by: UROLOGY

## 2022-01-24 RX ORDER — FLUTICASONE PROPIONATE 50 MCG
2 SPRAY, SUSPENSION (ML) NASAL AS NEEDED
COMMUNITY
End: 2022-02-14

## 2022-01-24 NOTE — PROGRESS NOTES
Cystoscopy    Date/Time: 1/24/2022 10:30 AM  Performed by: Kadie Vu MD  Authorized by: Kadie Vu MD   Preparation: Patient was prepped and draped in the usual sterile fashion.  Local anesthesia used: no    Anesthesia:  Local anesthesia used: no    Sedation:  Patient sedated: no    Patient tolerance: patient tolerated the procedure well with no immediate complications  Comments: Her original tumor was in her left renal pelvis and she underwent a left nephroureterectomy for high grade urothelial carcinoma in 09/2020. At that time, her stage was a T3. Her cystoscopy in 12/2020 and again in 04/2021 were both normal with no tumor seen. She had cystis cystica in November 2021 but no tumor.  Right retrograde pyelogram was done in Nov 2021 and was normal.     She does follow up with Dr. Frankel.     Cytoscopy Procedure:     Procedure: Flexible cytoscope was passed per urethra into the bladder without difficulty after proper consent. The bladder was inspected in a systematic meridian fashion. There were no tumors, lesions, stones, or other abnormalities noted within the bladder. Of note, there was no increased vascularity as well. Right ureteral orifice was identified and was normal in appearance. The flexible cytoscope was removed. The patient tolerated the procedure well.

## 2022-02-07 ENCOUNTER — HOSPITAL ENCOUNTER (OUTPATIENT)
Dept: CT IMAGING | Facility: HOSPITAL | Age: 67
Discharge: HOME | End: 2022-02-07
Admitting: INTERNAL MEDICINE

## 2022-02-07 DIAGNOSIS — C68.9 UROTHELIAL CANCER: ICD-10-CM

## 2022-02-07 LAB — CREAT BLDA-MCNC: 0.9 MG/DL

## 2022-02-07 PROCEDURE — 0 IOPAMIDOL PER 1 ML: Performed by: INTERNAL MEDICINE

## 2022-02-07 PROCEDURE — 82565 ASSAY OF CREATININE: CPT

## 2022-02-07 PROCEDURE — 71260 CT THORAX DX C+: CPT

## 2022-02-07 PROCEDURE — 74177 CT ABD & PELVIS W/CONTRAST: CPT

## 2022-02-07 RX ADMIN — IOPAMIDOL 100 ML: 755 INJECTION, SOLUTION INTRAVENOUS at 13:11

## 2022-02-14 ENCOUNTER — OFFICE VISIT (OUTPATIENT)
Dept: ONCOLOGY | Facility: HOSPITAL | Age: 67
End: 2022-02-14

## 2022-02-14 ENCOUNTER — LAB (OUTPATIENT)
Dept: ONCOLOGY | Facility: HOSPITAL | Age: 67
End: 2022-02-14

## 2022-02-14 VITALS
BODY MASS INDEX: 26.01 KG/M2 | HEART RATE: 75 BPM | WEIGHT: 142.2 LBS | DIASTOLIC BLOOD PRESSURE: 85 MMHG | TEMPERATURE: 98 F | RESPIRATION RATE: 18 BRPM | SYSTOLIC BLOOD PRESSURE: 154 MMHG | OXYGEN SATURATION: 98 %

## 2022-02-14 DIAGNOSIS — R21 SKIN RASH: ICD-10-CM

## 2022-02-14 DIAGNOSIS — C68.9 UROTHELIAL CANCER: Primary | ICD-10-CM

## 2022-02-14 DIAGNOSIS — L65.9 HAIR LOSS: ICD-10-CM

## 2022-02-14 DIAGNOSIS — C34.11 MALIGNANT NEOPLASM OF UPPER LOBE OF RIGHT LUNG: ICD-10-CM

## 2022-02-14 DIAGNOSIS — C68.9 UROTHELIAL CANCER: ICD-10-CM

## 2022-02-14 LAB
ALBUMIN SERPL-MCNC: 4.52 G/DL (ref 3.5–5.2)
ALBUMIN/GLOB SERPL: 1.7 G/DL
ALP SERPL-CCNC: 57 U/L (ref 39–117)
ALT SERPL W P-5'-P-CCNC: 21 U/L (ref 1–33)
ANION GAP SERPL CALCULATED.3IONS-SCNC: 7.3 MMOL/L (ref 5–15)
AST SERPL-CCNC: 14 U/L (ref 1–32)
BASOPHILS # BLD AUTO: 0.03 10*3/MM3 (ref 0–0.2)
BASOPHILS NFR BLD AUTO: 0.4 % (ref 0–1.5)
BILIRUB SERPL-MCNC: 0.2 MG/DL (ref 0–1.2)
BUN SERPL-MCNC: 13 MG/DL (ref 8–23)
BUN/CREAT SERPL: 15.3 (ref 7–25)
CALCIUM SPEC-SCNC: 9.8 MG/DL (ref 8.6–10.5)
CHLORIDE SERPL-SCNC: 105 MMOL/L (ref 98–107)
CO2 SERPL-SCNC: 25.7 MMOL/L (ref 22–29)
CREAT SERPL-MCNC: 0.85 MG/DL (ref 0.57–1)
DEPRECATED RDW RBC AUTO: 44 FL (ref 37–54)
EOSINOPHIL # BLD AUTO: 0.36 10*3/MM3 (ref 0–0.4)
EOSINOPHIL NFR BLD AUTO: 4.4 % (ref 0.3–6.2)
ERYTHROCYTE [DISTWIDTH] IN BLOOD BY AUTOMATED COUNT: 12.9 % (ref 12.3–15.4)
GFR SERPL CREATININE-BSD FRML MDRD: 67 ML/MIN/1.73
GLOBULIN UR ELPH-MCNC: 2.7 GM/DL
GLUCOSE SERPL-MCNC: 102 MG/DL (ref 65–99)
HCT VFR BLD AUTO: 44.1 % (ref 34–46.6)
HGB BLD-MCNC: 14.2 G/DL (ref 12–15.9)
IMM GRANULOCYTES # BLD AUTO: 0.02 10*3/MM3 (ref 0–0.05)
IMM GRANULOCYTES NFR BLD AUTO: 0.2 % (ref 0–0.5)
LYMPHOCYTES # BLD AUTO: 2.08 10*3/MM3 (ref 0.7–3.1)
LYMPHOCYTES NFR BLD AUTO: 25.2 % (ref 19.6–45.3)
MCH RBC QN AUTO: 29.3 PG (ref 26.6–33)
MCHC RBC AUTO-ENTMCNC: 32.2 G/DL (ref 31.5–35.7)
MCV RBC AUTO: 91.1 FL (ref 79–97)
MONOCYTES # BLD AUTO: 0.62 10*3/MM3 (ref 0.1–0.9)
MONOCYTES NFR BLD AUTO: 7.5 % (ref 5–12)
NEUTROPHILS NFR BLD AUTO: 5.16 10*3/MM3 (ref 1.7–7)
NEUTROPHILS NFR BLD AUTO: 62.3 % (ref 42.7–76)
PLATELET # BLD AUTO: 385 10*3/MM3 (ref 140–450)
PMV BLD AUTO: 9.7 FL (ref 6–12)
POTASSIUM SERPL-SCNC: 4.4 MMOL/L (ref 3.5–5.2)
PROT SERPL-MCNC: 7.2 G/DL (ref 6–8.5)
RBC # BLD AUTO: 4.84 10*6/MM3 (ref 3.77–5.28)
SODIUM SERPL-SCNC: 138 MMOL/L (ref 136–145)
TSH SERPL DL<=0.05 MIU/L-ACNC: 2.45 UIU/ML (ref 0.27–4.2)
WBC NRBC COR # BLD: 8.27 10*3/MM3 (ref 3.4–10.8)

## 2022-02-14 PROCEDURE — 85025 COMPLETE CBC W/AUTO DIFF WBC: CPT

## 2022-02-14 PROCEDURE — 84443 ASSAY THYROID STIM HORMONE: CPT

## 2022-02-14 PROCEDURE — 80053 COMPREHEN METABOLIC PANEL: CPT

## 2022-02-14 PROCEDURE — 99214 OFFICE O/P EST MOD 30 MIN: CPT | Performed by: INTERNAL MEDICINE

## 2022-02-14 PROCEDURE — 36415 COLL VENOUS BLD VENIPUNCTURE: CPT

## 2022-02-14 PROCEDURE — G0463 HOSPITAL OUTPT CLINIC VISIT: HCPCS | Performed by: INTERNAL MEDICINE

## 2022-02-14 RX ORDER — MULTIPLE VITAMINS W/ MINERALS TAB 9MG-400MCG
1 TAB ORAL DAILY
COMMUNITY

## 2022-02-14 NOTE — PROGRESS NOTES
Patient  Neda Núñez    Location  [facility] HEMATOLOGY & ONCOLOGY    Chief Complaint  Urothelial Cancer    Referring Provider: Jeff Art MD  PCP: Jeff Art MD    Subjective          Oncology/Hematology History Overview Note   Urothelial Carcinoma of the Renal Pelvis:  - s/p left nephroureterectomy in Sept 2020  - pathology showed papillary urothelial carcinoma, invasive, high grade, invading into the renal parenchyma, margins negative, no lymph nodes submitted, pT3Nx    RUL Lung Cancer:  - 2014 at an OSH with NSCLC  - treated with surgery and chemotherapy     Lung cancer (HCC)   10/25/2021 Initial Diagnosis    Lung cancer (HCC)         History of Present Illness  Patient comes in today to get the results of her recent restaging CT scans.  Fortunately this shows no evidence of disease recurrence.  She said she was little bit red because she occasionally has some pain in her low back.  However after reviewing the scan she realizes this is likely musculoskeletal in nature.  She is also concerned because she has had more hair loss over the past several months.  She also has some dry patches in her scalp, on her knees, and on her ankles.  She is concerned about fungal infection such as ringworm.  She was treated for in the past.    CT Chest With Contrast Diagnostic    Result Date: 2/7/2022  PROCEDURE: CT CHEST W CONTRAST DIAGNOSTIC  COMPARISON:  [facility], CT, CT CHEST W CONTRAST DIAGNOSTIC, 11/01/2021, 9:04.  [facility], CT, CT ABDOMEN PELVIS W CONTRAST, 11/01/2021, 9:04. INDICATIONS: restaging scan urothelial cancer  TECHNIQUE: After obtaining the patient's consent, CT images were obtained with non-ionic intravenous contrast material.   PROTOCOL:   Standard imaging protocol performed    RADIATION:   DLP: 1004.8 mGy*cm   Automated exposure control was utilized to minimize radiation dose. CONTRAST: 75 cc Isovue 370 I.V. LABS:   eGFR: 62.8ml/min/1.73m2  FINDINGS:   Previous right upper lobectomy.  Stable scarring in the upper to mid right chest.  No new or enlarging pulmonary nodules.  There are few tiny nodules in the left lung that are unchanged.  No adenopathy in the chest.  Refer to the separately dictated CT of the abdomen and pelvis.  No aggressive appearing bone change.       Previous right upper lobectomy with stable scarring in the right chest.  No convincing evidence for active malignancy in the chest.     RACHELL SCOTT MD       Electronically Signed and Approved By: RACHELL SCOTT MD on 2/07/2022 at 14:15             CT Abdomen Pelvis With Contrast    Result Date: 2/7/2022  PROCEDURE: CT ABDOMEN PELVIS W CONTRAST  COMPARISON: [facility], CT, CT ABDOMEN PELVIS W CONTRAST, 11/01/2021, 9:04.  INDICATIONS: restaging scan urothelial cancer  TECHNIQUE: After obtaining the patient's consent, CT images were created with non-ionic intravenous contrast material.   PROTOCOL:   Standard imaging protocol performed    RADIATION:   DLP: 1004.8mGy*cm   Automated exposure control was utilized to minimize radiation dose. CONTRAST: 75 cc Isovue 370 I.V. LABS:   eGFR: 62.8  ml/min/1.73m2  FINDINGS:  Refer to the separately dictated chest CT.  Liver, spleen, adrenal glands, pancreas, gallbladder unremarkable.  Bowel loops are nondilated.  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  Previous left nephrectomy.  No abnormal soft tissue or adenopathy.  Pelvis:  No pelvic mass or fluid.  No aggressive appearing bone change.       Previous left nephrectomy.  No convincing evidence for active malignancy in the abdomen or pelvis.     RACHELL SCOTT MD       Electronically Signed and Approved By: RACHELL SCOTT MD on 2/07/2022 at 14:18                 Review of Systems   Constitutional: Positive for fatigue. Negative for appetite change, diaphoresis, fever, unexpected weight gain and unexpected weight loss.   HENT: Negative for hearing loss, sore throat and voice change.     Eyes: Negative for blurred vision, double vision, pain, redness and visual disturbance.   Respiratory: Negative for cough, shortness of breath and wheezing.    Cardiovascular: Negative for chest pain, palpitations and leg swelling.   Endocrine: Negative for cold intolerance, heat intolerance, polydipsia and polyuria.   Genitourinary: Negative for decreased urine volume, difficulty urinating, frequency and urinary incontinence.   Musculoskeletal: Positive for back pain (Lower back pain ). Negative for arthralgias, joint swelling and myalgias.   Skin: Positive for dry skin and rash (patient believes she might have ringworm on ankle). Negative for color change, skin lesions and wound.   Neurological: Negative for dizziness, seizures, numbness and headache.   Hematological: Negative for adenopathy. Does not bruise/bleed easily.   Psychiatric/Behavioral: Negative for depressed mood. The patient is not nervous/anxious.    All other systems reviewed and are negative.      Past Medical History:   Diagnosis Date   • Allergies    • Ear problem    • Lung cancer (HCC)    • Reflux esophagitis    • Ureteral mass    • Urothelial cancer (HCC) 2020    (Left)   • Vocal cord dysfunction    • Wax in ear      Past Surgical History:   Procedure Laterality Date   • BACK SURGERY      LUMBAR   •  SECTION     • CYSTOSCOPY URETERAL BIOPSY     • CYSTOSCOPY, URETEROSCOPY, RETROGRADE PYELOGRAM, STENT INSERTION     • KIDNEY SURGERY     • LEG SURGERY      ignacio and pins    • LUNG SURGERY Right     upper lobe removed   • NEPHRECTOMY Left    • TRANSURETHRAL RESECTION OF BLADDER TUMOR N/A 2021    Procedure: CYSTOSCOPY TRANSURETHRAL RESECTION OF BLADDER TUMOR; CYSTOSCOPY RETROGRADE PYELOGRAM right;  Surgeon: Kadie Vu MD;  Location: [facility] (formerly [facility]);  Service: Urology;  Laterality: N/A;     Social History     Socioeconomic History   • Marital status:    • Number of children: 2   Tobacco Use   • Smoking status: Former  Smoker     Packs/day: 2.00   • Smokeless tobacco: Never Used   • Tobacco comment: Age: [age]   Vaping Use   • Vaping Use: Never used   Substance and Sexual Activity   • Alcohol use: Not Currently     Comment: Light - Rarely as of 7/30/2020   • Drug use: Never   • Sexual activity: Defer     Family History   Problem Relation Age of Onset   • Diabetes Brother         Unspecified Type   • Diabetes Maternal Grandfather         Unspecified Type   • Breast cancer Paternal Grandmother    • Cancer Other         Unspecified   • Migraines Other         Migraine Headaches   • Lung cancer Other        Objective   Physical Exam  General: Alert, cooperative, no acute distress, well appearing  Eyes: Anicteric sclera, PERRLA  Respiratory: normal respiratory effort  Cardiovascular: no lower extremity edema  Skin: Normal tone, no rash, no lesions  Psychiatric: Appropriate affect, intact judgment  Neurologic: No focal sensory or motor deficits, normal cognition   Musculoskeletal: Normal muscle strength and tone  Extremities: No clubbing, cyanosis, or deformities    Vitals:    02/14/22 1032   BP: 154/85   Pulse: 75   Resp: 18   Temp: 98 °F (36.7 °C)   SpO2: 98%   Weight: 64.5 kg (142 lb 3.2 oz)   PainSc: 0-No pain     ECOG score: 0         PHQ-9 Total Score:         Result Review :   The following data was reviewed by: Rochelle Frankel MD PhD on 02/14/2022:  Lab Results   Component Value Date    HGB 14.2 02/14/2022    HCT 44.1 02/14/2022    MCV 91.1 02/14/2022     02/14/2022    WBC 8.27 02/14/2022    NEUTROABS 5.16 02/14/2022    LYMPHSABS 2.08 02/14/2022    MONOSABS 0.62 02/14/2022    EOSABS 0.36 02/14/2022    BASOSABS 0.03 02/14/2022     Lab Results   Component Value Date    GLUCOSE 102 (H) 02/14/2022    BUN 13 02/14/2022    CREATININE 0.85 02/14/2022     02/14/2022    K 4.4 02/14/2022     02/14/2022    CO2 25.7 02/14/2022    CALCIUM 9.8 02/14/2022    PROTEINTOT 7.2 02/14/2022    ALBUMIN 4.52 02/14/2022    BILITOT  0.2 02/14/2022    ALKPHOS 57 02/14/2022    AST 14 02/14/2022    ALT 21 02/14/2022          Assessment and Plan    Diagnoses and all orders for this visit:    1. Urothelial cancer (HCC) (Primary)  -     CT chest w contrast; Future  -     CT abdomen pelvis w contrast; Future  -     CBC & Differential; Future  -     Comprehensive Metabolic Panel; Future    2. Skin rash  -     Ambulatory Referral to Dermatology    3. Malignant neoplasm of upper lobe of right lung (HCC)    4. Hair loss  -     TSH; Future      Urothelial carcinoma and history of lung cancer: Patient's recent staging CT scans show no evidence of disease recurrence.  I will follow up with her in 6 months with repeat restaging CT CAP with contrast.    Hair loss and skin rash: I checked a CBC, CMP, and TSH. All labs are completely normal. I will also refer the patient to dermatology for evaluation and treatment.    Patient was given instructions and counseling regarding her condition or for health maintenance advice. Please see specific information pulled into the AVS if appropriate.     Rochelle Frankel MD PhD    2/14/2022

## 2022-04-25 ENCOUNTER — OFFICE VISIT (OUTPATIENT)
Dept: UROLOGY | Facility: CLINIC | Age: 67
End: 2022-04-25

## 2022-04-25 VITALS
OXYGEN SATURATION: 99 % | SYSTOLIC BLOOD PRESSURE: 148 MMHG | WEIGHT: 145.4 LBS | DIASTOLIC BLOOD PRESSURE: 78 MMHG | HEIGHT: 62 IN | BODY MASS INDEX: 26.76 KG/M2 | HEART RATE: 73 BPM

## 2022-04-25 DIAGNOSIS — C68.9 UROTHELIAL CANCER: Primary | ICD-10-CM

## 2022-04-25 LAB
BILIRUB BLD-MCNC: NEGATIVE MG/DL
CLARITY, POC: CLEAR
COLOR UR: YELLOW
EXPIRATION DATE: NORMAL
GLUCOSE UR STRIP-MCNC: NEGATIVE MG/DL
KETONES UR QL: NEGATIVE
LEUKOCYTE EST, POC: NEGATIVE
Lab: NORMAL
NITRITE UR-MCNC: NEGATIVE MG/ML
PH UR: 7 [PH] (ref 5–8)
PROT UR STRIP-MCNC: NEGATIVE MG/DL
RBC # UR STRIP: NEGATIVE /UL
SP GR UR: 1.01 (ref 1–1.03)
UROBILINOGEN UR QL: NORMAL

## 2022-04-25 PROCEDURE — 52000 CYSTOURETHROSCOPY: CPT | Performed by: UROLOGY

## 2022-04-25 PROCEDURE — 81003 URINALYSIS AUTO W/O SCOPE: CPT | Performed by: UROLOGY

## 2022-04-25 NOTE — PROGRESS NOTES
Cystoscopy    Date/Time: 4/25/2022 1:39 PM  Performed by: Kadie Vu MD  Authorized by: Kadie Vu MD   Preparation: Patient was prepped and draped in the usual sterile fashion.  Local anesthesia used: no    Anesthesia:  Local anesthesia used: no    Sedation:  Patient sedated: no    Patient tolerance: patient tolerated the procedure well with no immediate complications  Comments: Her original tumor was in her left renal pelvis and she underwent a left nephroureterectomy for high grade urothelial carcinoma in 09/2020. At that time, her stage was a T3. Her cystoscopy in 12/2020 and again in 04/2021 were both normal with no tumor seen. She had cystis cystica in November 2021 but no tumor.  Right retrograde pyelogram was done in Nov 2021 and was normal.     CT scan in January 2022:    IMPRESSION:  Previous left nephrectomy.  No convincing evidence for active malignancy in the abdomen   or pelvis.    She does follow up with Dr. Frankel.     Cytoscopy Procedure:     Procedure: Flexible cytoscope was passed per urethra into the bladder without difficulty after proper consent. The bladder was inspected in a systematic meridian fashion. There were no tumors, lesions, stones, or other abnormalities noted within the bladder. Of note, there was no increased vascularity as well. The flexible cytoscope was removed. The patient tolerated the procedure well.

## 2022-07-25 ENCOUNTER — PROCEDURE VISIT (OUTPATIENT)
Dept: UROLOGY | Facility: CLINIC | Age: 67
End: 2022-07-25

## 2022-07-25 ENCOUNTER — PREP FOR SURGERY (OUTPATIENT)
Dept: OTHER | Facility: HOSPITAL | Age: 67
End: 2022-07-25

## 2022-07-25 VITALS — HEIGHT: 62 IN | BODY MASS INDEX: 27.02 KG/M2 | WEIGHT: 146.8 LBS

## 2022-07-25 DIAGNOSIS — C68.9 UROTHELIAL CANCER: Primary | ICD-10-CM

## 2022-07-25 LAB
BILIRUB BLD-MCNC: NEGATIVE MG/DL
CLARITY, POC: CLEAR
COLOR UR: YELLOW
EXPIRATION DATE: 723
GLUCOSE UR STRIP-MCNC: NEGATIVE MG/DL
KETONES UR QL: NEGATIVE
LEUKOCYTE EST, POC: NEGATIVE
Lab: NORMAL
NITRITE UR-MCNC: NEGATIVE MG/ML
PH UR: 5 [PH] (ref 5–8)
PROT UR STRIP-MCNC: NEGATIVE MG/DL
RBC # UR STRIP: NEGATIVE /UL
SP GR UR: 1.02 (ref 1–1.03)
UROBILINOGEN UR QL: NORMAL

## 2022-07-25 PROCEDURE — 81003 URINALYSIS AUTO W/O SCOPE: CPT | Performed by: UROLOGY

## 2022-07-25 PROCEDURE — 52000 CYSTOURETHROSCOPY: CPT | Performed by: UROLOGY

## 2022-07-25 RX ORDER — SODIUM CHLORIDE 9 MG/ML
100 INJECTION, SOLUTION INTRAVENOUS CONTINUOUS
Status: CANCELLED | OUTPATIENT
Start: 2022-07-25

## 2022-07-25 RX ORDER — KETOCONAZOLE 20 MG/ML
SHAMPOO TOPICAL
COMMUNITY
Start: 2022-04-22 | End: 2022-08-15

## 2022-07-25 RX ORDER — LEVOFLOXACIN 5 MG/ML
500 INJECTION, SOLUTION INTRAVENOUS ONCE
Status: CANCELLED | OUTPATIENT
Start: 2022-07-25 | End: 2022-07-25

## 2022-07-25 NOTE — PROGRESS NOTES
Cystoscopy    Date/Time: 7/25/2022 1:36 PM  Performed by: Kadie Vu MD  Authorized by: Kadie Vu MD   Preparation: Patient was prepped and draped in the usual sterile fashion.  Local anesthesia used: no    Anesthesia:  Local anesthesia used: no    Sedation:  Patient sedated: no    Patient tolerance: patient tolerated the procedure well with no immediate complications  Comments: Her original tumor was in her left renal pelvis and she underwent a left nephroureterectomy for high grade urothelial carcinoma in 09/2020. At that time, her stage was a T3. Her cystoscopy in 12/2020 and again in 04/2021 were both normal with no tumor seen. She had cystis cystica in November 2021 but no tumor.  Right retrograde pyelogram was done in Nov 2021 and was normal.     CT scan in January 2022:    IMPRESSION:  Previous left nephrectomy.  No convincing evidence for active malignancy in the abdomen   or pelvis.    She does follow up with Dr. Frankel.          Procedure: Flexible cytoscope was passed per urethra into the bladder without difficulty after proper consent. The bladder was inspected in a systematic meridian fashion. There were no tumors, lesions, stones, or other abnormalities noted within the bladder. Of note, there was no increased vascularity as well. Both ureteral orifices were identified and were normal in appearance. The flexible cytoscope was removed. The patient tolerated the procedure well.

## 2022-07-25 NOTE — H&P
[facility]   Urology HISTORY AND PHYSICAL    Patient Name: Neda Núñez  : 1955  MRN: [id number]  Primary Care Physician:  Jeff Art MD  Date of admission: (Not on file)    Subjective   Subjective       Patient has a history of urothelial cancer of the left renal pelvis and presents for cystoscopy and retrograde pyelogram.      Personal History     Past Medical History:   Diagnosis Date   • Allergies    • Ear problem    • Lung cancer (HCC)    • Reflux esophagitis    • Ureteral mass    • Urothelial cancer (HCC) 2020    (Left)   • Vocal cord dysfunction    • Wax in ear        Past Surgical History:   Procedure Laterality Date   • BACK SURGERY      LUMBAR   •  SECTION     • CYSTOSCOPY URETERAL BIOPSY     • CYSTOSCOPY, URETEROSCOPY, RETROGRADE PYELOGRAM, STENT INSERTION     • KIDNEY SURGERY     • LEG SURGERY      ignacio and pins    • LUNG SURGERY Right     upper lobe removed   • NEPHRECTOMY Left    • TRANSURETHRAL RESECTION OF BLADDER TUMOR N/A 2021    Procedure: CYSTOSCOPY TRANSURETHRAL RESECTION OF BLADDER TUMOR; CYSTOSCOPY RETROGRADE PYELOGRAM right;  Surgeon: Kadie Vu MD;  Location: [facility];  Service: Urology;  Laterality: N/A;       Family History: family history includes Breast cancer in her paternal grandmother; Cancer in an other family member; Diabetes in her brother and maternal grandfather; Lung cancer in an other family member; Migraines in an other family member. Otherwise pertinent FHx was reviewed and not pertinent to current issue.    Social History:  reports that she has quit smoking. She smoked 2.00 packs per day. She has never used smokeless tobacco. She reports previous alcohol use. She reports that she does not use drugs.    Home Medications:  Biotin, ketoconazole, and multivitamin with minerals    Allergies:  Allergies   Allergen Reactions   • Penicillins Unknown - High Severity     Tongue swelling   • Sulfa Antibiotics Unknown - High Severity      Itching/Rash       Objective    Objective     Vitals:        Physical Exam  Constitutional:       Appearance: Normal appearance.   Cardiovascular:      Rate and Rhythm: Normal rate and regular rhythm.   Pulmonary:      Effort: Pulmonary effort is normal.      Breath sounds: Normal breath sounds.   Neurological:      Mental Status: She is alert. Mental status is at baseline.   Psychiatric:         Mood and Affect: Mood and affect normal.         Speech: Speech normal.         Judgment: Judgment normal.         Result Review    Result Review:  I have personally reviewed the results from the time of this admission to 7/25/2022 14:00 EDT and agree with these findings:  [x]  Laboratory  []  Microbiology  [x]  Radiology  []  EKG/Telemetry   []  Cardiology/Vascular   [x]  Pathology  [x]  Old records  []  Other:      Assessment & Plan   Assessment / Plan       Active Hospital Problems:  There are no active hospital problems to display for this patient.      Plan: Cystoscopy and bilateral retrograde pyelograms.    Risks and benefits discussed with patient and they are agreeable to proceed.    DVT prophylaxis:  No DVT prophylaxis order currently exists.    CODE STATUS:           Electronically signed by Kadie Vu MD, 07/25/22, 2:00 PM EDT.

## 2022-08-08 ENCOUNTER — HOSPITAL ENCOUNTER (OUTPATIENT)
Dept: CT IMAGING | Facility: HOSPITAL | Age: 67
Discharge: HOME | End: 2022-08-08
Admitting: INTERNAL MEDICINE

## 2022-08-08 DIAGNOSIS — C68.9 UROTHELIAL CANCER: ICD-10-CM

## 2022-08-08 LAB
CREAT BLDA-MCNC: 1 MG/DL
EGFRCR SERPLBLD CKD-EPI 2021: 62.3 ML/MIN/1.73

## 2022-08-08 PROCEDURE — 82565 ASSAY OF CREATININE: CPT

## 2022-08-08 PROCEDURE — 74177 CT ABD & PELVIS W/CONTRAST: CPT

## 2022-08-08 PROCEDURE — 0 IOPAMIDOL PER 1 ML: Performed by: INTERNAL MEDICINE

## 2022-08-08 PROCEDURE — 71260 CT THORAX DX C+: CPT

## 2022-08-08 RX ADMIN — IOPAMIDOL 150 ML: 755 INJECTION, SOLUTION INTRAVENOUS at 13:05

## 2022-08-15 ENCOUNTER — OFFICE VISIT (OUTPATIENT)
Dept: ONCOLOGY | Facility: HOSPITAL | Age: 67
End: 2022-08-15

## 2022-08-15 VITALS
WEIGHT: 145.06 LBS | HEART RATE: 73 BPM | BODY MASS INDEX: 26.53 KG/M2 | TEMPERATURE: 97.6 F | DIASTOLIC BLOOD PRESSURE: 77 MMHG | OXYGEN SATURATION: 98 % | SYSTOLIC BLOOD PRESSURE: 145 MMHG | RESPIRATION RATE: 18 BRPM

## 2022-08-15 DIAGNOSIS — C68.9 UROTHELIAL CANCER: ICD-10-CM

## 2022-08-15 PROCEDURE — 99213 OFFICE O/P EST LOW 20 MIN: CPT | Performed by: INTERNAL MEDICINE

## 2022-08-15 PROCEDURE — G0463 HOSPITAL OUTPT CLINIC VISIT: HCPCS | Performed by: INTERNAL MEDICINE

## 2022-08-15 RX ORDER — ZINC SULFATE 50(220)MG
220 CAPSULE ORAL DAILY
COMMUNITY

## 2022-08-15 RX ORDER — MELATONIN
1000 DAILY
COMMUNITY

## 2022-08-15 NOTE — PROGRESS NOTES
Patient  Neda Núñez    Location  [facility] HEMATOLOGY & ONCOLOGY    Chief Complaint  Urethelial Cancer    Referring Provider: Jeff Art MD  PCP: Jeff Art MD    Subjective          Oncology/Hematology History Overview Note   Urothelial Carcinoma of the Renal Pelvis:  - s/p left nephroureterectomy in Sept 2020  - pathology showed papillary urothelial carcinoma, invasive, high grade, invading into the renal parenchyma, margins negative, no lymph nodes submitted, pT3Nx    RUL Lung Cancer:  - 2014 at an OSH with NSCLC  - treated with surgery and chemotherapy     Lung cancer (HCC)   10/25/2021 Initial Diagnosis    Lung cancer (HCC)         HPI  Patient comes in today to get the results of her recent CT scan.  Fortunately, this shows no evidence of disease recurrence.  She says she is in good health.  She is gaining weight and needs to be more active.  She hates hot weather and tends to be less active during this time.  She was concerned about the findings of fatty liver disease on her CT scan.  We discussed the nature of this finding.  She was also concerned about the past finding of pulmonary emphysema.  We discussed the natural course of this as well.    CT Chest With Contrast Diagnostic    Result Date: 8/8/2022  PROCEDURE: CT CHEST W CONTRAST DIAGNOSTIC  COMPARISON:  [facility], CT, CT CHEST W CONTRAST DIAGNOSTIC, 2/07/2022, 13:24.  [facility], CT, CT ABDOMEN PELVIS W CONTRAST, 2/07/2022, 13:24. INDICATIONS: Restaging.  History of left-sided renal and urothelial cancer status post left nephrectomy.  History of lung cancer with prior right upper lobe resection.  TECHNIQUE: After obtaining the patient's consent, CT images were obtained with non-ionic intravenous contrast material.   PROTOCOL:   Standard imaging protocol performed    RADIATION:   DLP:266.7 mGy*cm   Automated exposure control was utilized to minimize radiation dose. CONTRAST:  50 cc Isovue 370  I.V. LABS:   eGFR: 55.5 ml/min/1.73m2  FINDINGS:  The visualized soft tissue structures at the base of the neck including the thyroid demonstrate a small 7 mm hypodense nodule within the left lobe.  Patient previously underwent dedicated thyroid evaluation within the past year.  There is no lower cervical or axillary adenopathy.  The heart size is normal.  There is no pericardial effusion.  The aorta is normal in caliber without evidence of aneurysm formation or dissection.  The main pulmonary artery appears normal in caliber.  There are no filling defects within the pulmonary arterial system to suggest presence of underlying pulmonary embolism.  There is no mediastinal or hilar lymphadenopathy.  The esophagus is normal in course and caliber.  There is evidence of prior right upper lobectomy.  There is linear scarring within the right apex which appears similar to the prior examination.  There are no new pulmonary nodules.  There is no pleural effusion or pneumothorax.  There is a small focus of rounded atelectasis within the lingula.  There are stable micro nodules within the left lung.  There are no acute osseous findings of the thorax.  No suspicious lytic or sclerotic osseous lesion.  For findings below the diaphragm, please see separately dictated CT abdomen and pelvis report.        1. Postsurgical changes of prior right upper lobectomy with stable linear scarring within the right apex. 2. No mediastinal or hilar lymphadenopathy.      KADI MCGOVERN MD       Electronically Signed and Approved By: KADI MCGOVERN MD on 8/08/2022 at 16:01             CT Abdomen Pelvis With Contrast    Result Date: 8/8/2022  PROCEDURE: CT ABDOMEN PELVIS W CONTRAST  COMPARISON: [place], CT, CT ABDOMEN PELVIS W CONTRAST, 2/07/2022, 13:24.  INDICATIONS: restaging scan  TECHNIQUE: After obtaining the patient's consent, CT images were created with non-ionic intravenous contrast material.   PROTOCOL:   Standard  imaging protocol performed    RADIATION:   DLP: 760.9 mGy*cm   Automated exposure control was utilized to minimize radiation dose. CONTRAST: 50 cc Isovue 370 I.V. LABS:   eGFR: 55.5 ml/min/1.73m2  FINDINGS:  For findings above the diaphragm, please see separately dictated CT chest report from same date.  The liver is normal in size and contour.  There is decreased density of the liver parenchyma likely related to underlying hepatic steatosis.  The gallbladder is present without wall thickening.  There is no intrahepatic or extrahepatic biliary ductal dilatation.  The spleen, adrenal glands, and pancreas appear within normal limits.  There are postsurgical changes of prior left nephrectomy.  The right kidney appears normal without evidence of solid renal mass.  There is no hydronephrosis.  There is a 7 mm cyst at the lower pole of the right kidney.  There is no hydronephrosis or hydroureter.  The urinary bladder is fluid filled without wall thickening.  There is a small amount of fluid within the endometrial canal.  There is a partially exophytic 1.2 cm lesion arising from the posterior uterine body likely representing an exophytic subserosal fibroid.  The stomach and duodenum are normal in caliber and configuration.  There are no abnormally dilated loops of small bowel to suggest small bowel obstruction or small bowel inflammation.  There is no evidence of acute colitis or diverticulitis.  There is sigmoid diverticulosis.  There is no free fluid or free air.  The aorta is normal in caliber without evidence of aneurysm formation.  There is no mesenteric, retroperitoneal, or pelvic lymphadenopathy.  There is degenerative disc disease of the lumbar spine.        1. Postsurgical changes of prior left nephrectomy without evidence of local recurrence. 2. No lymphadenopathy within the abdomen or pelvis. 3. Partially exophytic 1.2 cm mass rising from the posterior aspect of the uterus, likely representing a subserosal  fibroid. 4. Hepatic steatosis.      KADI MCGOVERN MD       Electronically Signed and Approved By: KADI MCGOVERN MD on 2022 at 16:10                 Review of Systems   Constitutional: Negative for appetite change, diaphoresis, fatigue, fever, unexpected weight gain and unexpected weight loss.   HENT: Negative for hearing loss, sore throat and voice change.    Eyes: Negative for blurred vision, double vision, pain, redness and visual disturbance.   Respiratory: Negative for cough, shortness of breath and wheezing.    Cardiovascular: Negative for chest pain, palpitations and leg swelling.   Endocrine: Negative for cold intolerance, heat intolerance, polydipsia and polyuria.   Genitourinary: Negative for decreased urine volume, difficulty urinating, frequency and urinary incontinence.   Musculoskeletal: Negative for arthralgias, back pain, joint swelling and myalgias.   Skin: Negative for color change, rash, skin lesions and wound.   Neurological: Negative for dizziness, seizures, numbness and headache.   Hematological: Negative for adenopathy. Does not bruise/bleed easily.   Psychiatric/Behavioral: Negative for depressed mood. The patient is not nervous/anxious.    All other systems reviewed and are negative.      Past Medical History:   Diagnosis Date   • Allergies    • Ear problem    • Lung cancer (HCC)    • Reflux esophagitis    • Ureteral mass    • Urothelial cancer (HCC) 2020    (Left)   • Vocal cord dysfunction    • Wax in ear      Past Surgical History:   Procedure Laterality Date   • BACK SURGERY      LUMBAR   •  SECTION     • CYSTOSCOPY URETERAL BIOPSY     • CYSTOSCOPY, URETEROSCOPY, RETROGRADE PYELOGRAM, STENT INSERTION     • KIDNEY SURGERY     • LEG SURGERY      ignacio and pins    • LUNG SURGERY Right     upper lobe removed   • NEPHRECTOMY Left    • TRANSURETHRAL RESECTION OF BLADDER TUMOR N/A 2021    Procedure: CYSTOSCOPY TRANSURETHRAL RESECTION OF BLADDER TUMOR; CYSTOSCOPY  RETROGRADE PYELOGRAM right;  Surgeon: Kadie Vu MD;  Location: [facility] OR;  Service: Urology;  Laterality: N/A;     Social History     Socioeconomic History   • Marital status:    • Number of children: 2   Tobacco Use   • Smoking status: Former Smoker     Packs/day: 2.00   • Smokeless tobacco: Never Used   • Tobacco comment: Age: [age]   Vaping Use   • Vaping Use: Never used   Substance and Sexual Activity   • Alcohol use: Not Currently     Comment: Light - Rarely as of 7/30/2020   • Drug use: Never   • Sexual activity: Defer     Family History   Problem Relation Age of Onset   • Diabetes Brother         Unspecified Type   • Diabetes Maternal Grandfather         Unspecified Type   • Breast cancer Paternal Grandmother    • Cancer Other         Unspecified   • Migraines Other         Migraine Headaches   • Lung cancer Other        Objective   Physical Exam  General: Alert, cooperative, no acute distress  Eyes: Anicteric sclera, PERRLA  Respiratory: normal respiratory effort  Cardiovascular: no lower extremity edema  Skin: Normal tone, no rash, no lesions  Psychiatric: Appropriate affect, intact judgment  Neurologic: No focal sensory or motor deficits, normal cognition   Musculoskeletal: Normal muscle strength and tone  Extremities: No clubbing, cyanosis, or deformities     There were no vitals filed for this visit.            PHQ-9 Total Score:         Result Review :   The following data was reviewed by: Kae Harding MA on 08/15/2022:  Lab Results   Component Value Date    HGB 14.2 02/14/2022    HCT 44.1 02/14/2022    MCV 91.1 02/14/2022     02/14/2022    WBC 8.27 02/14/2022    NEUTROABS 5.16 02/14/2022    LYMPHSABS 2.08 02/14/2022    MONOSABS 0.62 02/14/2022    EOSABS 0.36 02/14/2022    BASOSABS 0.03 02/14/2022     Lab Results   Component Value Date    GLUCOSE 102 (H) 02/14/2022    BUN 13 02/14/2022    CREATININE 1.00 08/08/2022     02/14/2022    K 4.4 02/14/2022      02/14/2022    CO2 25.7 02/14/2022    CALCIUM 9.8 02/14/2022    PROTEINTOT 7.2 02/14/2022    ALBUMIN 4.52 02/14/2022    BILITOT 0.2 02/14/2022    ALKPHOS 57 02/14/2022    AST 14 02/14/2022    ALT 21 02/14/2022          Assessment and Plan    Diagnoses and all orders for this visit:    1. Urothelial cancer (HCC)      Urothelial carcinoma and history of lung cancer: Patient's recent staging CT scans show no evidence of disease recurrence.  She is now transitioning to 6-month follow-up with Dr. Vu.  I will follow up with her in 6 months as well with repeat restaging CT CAP with contrast.    Patient was given instructions and counseling regarding her condition or for health maintenance advice. Please see specific information pulled into the AVS if appropriate.

## 2022-10-21 ENCOUNTER — TELEPHONE (OUTPATIENT)
Dept: FAMILY MEDICINE CLINIC | Facility: CLINIC | Age: 67
End: 2022-10-21

## 2022-10-21 DIAGNOSIS — R73.01 IMPAIRED FASTING BLOOD SUGAR: ICD-10-CM

## 2022-10-21 DIAGNOSIS — Z00.00 ENCOUNTER FOR ANNUAL WELLNESS EXAM IN MEDICARE PATIENT: ICD-10-CM

## 2022-10-21 DIAGNOSIS — N18.31 STAGE 3A CHRONIC KIDNEY DISEASE: Primary | ICD-10-CM

## 2022-10-21 DIAGNOSIS — Z00.00 ANNUAL PHYSICAL EXAM: ICD-10-CM

## 2022-10-21 DIAGNOSIS — E78.2 MIXED HYPERLIPIDEMIA: ICD-10-CM

## 2022-10-21 NOTE — TELEPHONE ENCOUNTER
SHE NEEDS HER BLOOD WORK PUT IN FOR HER MAW PLEASE SHE IS GOING ON Monday to get it done pleas and thank you

## 2022-10-24 ENCOUNTER — LAB (OUTPATIENT)
Dept: LAB | Facility: HOSPITAL | Age: 67
End: 2022-10-24

## 2022-10-24 DIAGNOSIS — Z00.00 ENCOUNTER FOR ANNUAL WELLNESS EXAM IN MEDICARE PATIENT: ICD-10-CM

## 2022-10-24 DIAGNOSIS — E78.2 MIXED HYPERLIPIDEMIA: ICD-10-CM

## 2022-10-24 DIAGNOSIS — N18.31 STAGE 3A CHRONIC KIDNEY DISEASE: ICD-10-CM

## 2022-10-24 DIAGNOSIS — R73.01 IMPAIRED FASTING BLOOD SUGAR: ICD-10-CM

## 2022-10-24 DIAGNOSIS — Z00.00 ANNUAL PHYSICAL EXAM: ICD-10-CM

## 2022-10-24 LAB
ALBUMIN SERPL-MCNC: 4.2 G/DL (ref 3.5–5.2)
ALBUMIN/GLOB SERPL: 1.8 G/DL
ALP SERPL-CCNC: 43 U/L (ref 39–117)
ALT SERPL W P-5'-P-CCNC: 24 U/L (ref 1–33)
ANION GAP SERPL CALCULATED.3IONS-SCNC: 8.7 MMOL/L (ref 5–15)
AST SERPL-CCNC: 20 U/L (ref 1–32)
BASOPHILS # BLD AUTO: 0.04 10*3/MM3 (ref 0–0.2)
BASOPHILS NFR BLD AUTO: 0.6 % (ref 0–1.5)
BILIRUB SERPL-MCNC: 0.4 MG/DL (ref 0–1.2)
BUN SERPL-MCNC: 13 MG/DL (ref 8–23)
BUN/CREAT SERPL: 14 (ref 7–25)
CALCIUM SPEC-SCNC: 9.4 MG/DL (ref 8.6–10.5)
CHLORIDE SERPL-SCNC: 104 MMOL/L (ref 98–107)
CHOLEST SERPL-MCNC: 218 MG/DL (ref 0–200)
CO2 SERPL-SCNC: 26.3 MMOL/L (ref 22–29)
CREAT SERPL-MCNC: 0.93 MG/DL (ref 0.57–1)
DEPRECATED RDW RBC AUTO: 41.9 FL (ref 37–54)
EGFRCR SERPLBLD CKD-EPI 2021: 67.9 ML/MIN/1.73
EOSINOPHIL # BLD AUTO: 0.24 10*3/MM3 (ref 0–0.4)
EOSINOPHIL NFR BLD AUTO: 3.7 % (ref 0.3–6.2)
ERYTHROCYTE [DISTWIDTH] IN BLOOD BY AUTOMATED COUNT: 12.4 % (ref 12.3–15.4)
GLOBULIN UR ELPH-MCNC: 2.3 GM/DL
GLUCOSE SERPL-MCNC: 99 MG/DL (ref 65–99)
HBA1C MFR BLD: 5.9 % (ref 4.8–5.6)
HCT VFR BLD AUTO: 40.7 % (ref 34–46.6)
HDLC SERPL-MCNC: 60 MG/DL (ref 40–60)
HGB BLD-MCNC: 13.5 G/DL (ref 12–15.9)
IMM GRANULOCYTES # BLD AUTO: 0.04 10*3/MM3 (ref 0–0.05)
IMM GRANULOCYTES NFR BLD AUTO: 0.6 % (ref 0–0.5)
LDLC SERPL CALC-MCNC: 135 MG/DL (ref 0–100)
LDLC/HDLC SERPL: 2.21 {RATIO}
LYMPHOCYTES # BLD AUTO: 2.5 10*3/MM3 (ref 0.7–3.1)
LYMPHOCYTES NFR BLD AUTO: 38.2 % (ref 19.6–45.3)
MCH RBC QN AUTO: 30.5 PG (ref 26.6–33)
MCHC RBC AUTO-ENTMCNC: 33.2 G/DL (ref 31.5–35.7)
MCV RBC AUTO: 92.1 FL (ref 79–97)
MONOCYTES # BLD AUTO: 0.38 10*3/MM3 (ref 0.1–0.9)
MONOCYTES NFR BLD AUTO: 5.8 % (ref 5–12)
NEUTROPHILS NFR BLD AUTO: 3.35 10*3/MM3 (ref 1.7–7)
NEUTROPHILS NFR BLD AUTO: 51.1 % (ref 42.7–76)
NRBC BLD AUTO-RTO: 0 /100 WBC (ref 0–0.2)
PLATELET # BLD AUTO: 349 10*3/MM3 (ref 140–450)
PMV BLD AUTO: 10.3 FL (ref 6–12)
POTASSIUM SERPL-SCNC: 4.1 MMOL/L (ref 3.5–5.2)
PROT SERPL-MCNC: 6.5 G/DL (ref 6–8.5)
RBC # BLD AUTO: 4.42 10*6/MM3 (ref 3.77–5.28)
SODIUM SERPL-SCNC: 139 MMOL/L (ref 136–145)
TRIGL SERPL-MCNC: 128 MG/DL (ref 0–150)
TSH SERPL DL<=0.05 MIU/L-ACNC: 1.46 UIU/ML (ref 0.27–4.2)
VLDLC SERPL-MCNC: 23 MG/DL (ref 5–40)
WBC NRBC COR # BLD: 6.55 10*3/MM3 (ref 3.4–10.8)

## 2022-10-24 PROCEDURE — 80061 LIPID PANEL: CPT

## 2022-10-24 PROCEDURE — 85025 COMPLETE CBC W/AUTO DIFF WBC: CPT

## 2022-10-24 PROCEDURE — 36415 COLL VENOUS BLD VENIPUNCTURE: CPT

## 2022-10-24 PROCEDURE — 80053 COMPREHEN METABOLIC PANEL: CPT

## 2022-10-24 PROCEDURE — 83036 HEMOGLOBIN GLYCOSYLATED A1C: CPT

## 2022-10-24 PROCEDURE — 84443 ASSAY THYROID STIM HORMONE: CPT

## 2022-10-27 ENCOUNTER — OFFICE VISIT (OUTPATIENT)
Dept: FAMILY MEDICINE CLINIC | Facility: CLINIC | Age: 67
End: 2022-10-27

## 2022-10-27 VITALS
BODY MASS INDEX: 26.43 KG/M2 | HEIGHT: 62 IN | WEIGHT: 143.6 LBS | OXYGEN SATURATION: 97 % | DIASTOLIC BLOOD PRESSURE: 92 MMHG | TEMPERATURE: 97.8 F | SYSTOLIC BLOOD PRESSURE: 146 MMHG | HEART RATE: 85 BPM | RESPIRATION RATE: 19 BRPM

## 2022-10-27 DIAGNOSIS — Z98.890 S/P THYROID BIOPSY: ICD-10-CM

## 2022-10-27 DIAGNOSIS — R73.03 PREDIABETES: ICD-10-CM

## 2022-10-27 DIAGNOSIS — C68.9 UROTHELIAL CANCER: ICD-10-CM

## 2022-10-27 DIAGNOSIS — Z23 FLU VACCINE NEED: ICD-10-CM

## 2022-10-27 DIAGNOSIS — Z90.5 H/O LEFT NEPHRECTOMY: ICD-10-CM

## 2022-10-27 DIAGNOSIS — Z00.00 MEDICARE ANNUAL WELLNESS VISIT, SUBSEQUENT: Primary | ICD-10-CM

## 2022-10-27 DIAGNOSIS — C34.11 MALIGNANT NEOPLASM OF UPPER LOBE OF RIGHT LUNG: ICD-10-CM

## 2022-10-27 DIAGNOSIS — E04.1 THYROID NODULE: ICD-10-CM

## 2022-10-27 PROCEDURE — G0008 ADMIN INFLUENZA VIRUS VAC: HCPCS | Performed by: STUDENT IN AN ORGANIZED HEALTH CARE EDUCATION/TRAINING PROGRAM

## 2022-10-27 PROCEDURE — 1159F MED LIST DOCD IN RCRD: CPT | Performed by: STUDENT IN AN ORGANIZED HEALTH CARE EDUCATION/TRAINING PROGRAM

## 2022-10-27 PROCEDURE — G0438 PPPS, INITIAL VISIT: HCPCS | Performed by: STUDENT IN AN ORGANIZED HEALTH CARE EDUCATION/TRAINING PROGRAM

## 2022-10-27 PROCEDURE — 1170F FXNL STATUS ASSESSED: CPT | Performed by: STUDENT IN AN ORGANIZED HEALTH CARE EDUCATION/TRAINING PROGRAM

## 2022-10-27 PROCEDURE — 90662 IIV NO PRSV INCREASED AG IM: CPT | Performed by: STUDENT IN AN ORGANIZED HEALTH CARE EDUCATION/TRAINING PROGRAM

## 2022-10-27 PROCEDURE — 96160 PT-FOCUSED HLTH RISK ASSMT: CPT | Performed by: STUDENT IN AN ORGANIZED HEALTH CARE EDUCATION/TRAINING PROGRAM

## 2022-10-27 NOTE — PROGRESS NOTES
The ABCs of the Annual Wellness Visit  Subsequent Medicare Wellness Visit    No chief complaint on file.     Subjective    History of Present Illness:  Neda Núñez is a 67 y.o. female who presents for a Subsequent Medicare Wellness Visit.    The following portions of the patient's history were reviewed and   updated as appropriate: allergies, current medications, past family history, past medical history, past social history, past surgical history and problem list.    Compared to one year ago, the patient feels her physical   health is the same.    Compared to one year ago, the patient feels her mental   health is the same.    Recent Hospitalizations:  She was not admitted to the hospital during the last year.       Current Medical Providers:  Patient Care Team:  Jeff Art MD as PCP - General (Family Medicine)  Rochelle Frankel MD PhD as Consulting Physician (Hematology and Oncology)  Kadie Vu MD as Consulting Physician (Urology)    Outpatient Medications Prior to Visit   Medication Sig Dispense Refill   • BIOTIN PO Take  by mouth.     • cholecalciferol (VITAMIN D3) 25 MCG (1000 UT) tablet Take 1,000 Units by mouth Daily.     • multivitamin with minerals tablet tablet Take 1 tablet by mouth Daily.     • zinc sulfate (ZINCATE) 220 (50 Zn) MG capsule Take 220 mg by mouth Daily.       No facility-administered medications prior to visit.       No opioid medication identified on active medication list. I have reviewed chart for other potential  high risk medication/s and harmful drug interactions in the elderly.          Aspirin is not on active medication list.  Aspirin use is not indicated based on review of current medical condition/s. Risk of harm outweighs potential benefits.  .    Patient Active Problem List   Diagnosis   • Urothelial cancer (HCC)   • Esophageal reflux   • Lung cancer (HCC)   • Other acute sinusitis   • Ureteral mass   • Adnexal fullness   • Thyroid nodule   • Mixed hyperlipidemia  "  • Osteopenia of both hips   • Pulmonary emphysema (HCC)   • H/O left nephrectomy     Advance Care Planning  Advance Directive is not on file.  ACP discussion was held with the patient during this visit. Patient does not have an advance directive, information provided.          Objective    Vitals:    10/27/22 1551   BP: 146/92   Pulse: 85   Resp: 19   Temp: 97.8 °F (36.6 °C)   SpO2: 97%   Weight: 65.1 kg (143 lb 9.6 oz)   Height: 157.5 cm (62\")     Estimated body mass index is 26.26 kg/m² as calculated from the following:    Height as of this encounter: 157.5 cm (62\").    Weight as of this encounter: 65.1 kg (143 lb 9.6 oz).    BMI is >= 25 and <30. (Overweight) The following options were offered after discussion;: exercise counseling/recommendations      Does the patient have evidence of cognitive impairment? No    Physical Exam  Lab Results   Component Value Date    TRIG 128 10/24/2022    HDL 60 10/24/2022     (H) 10/24/2022    VLDL 23 10/24/2022    HGBA1C 5.90 (H) 10/24/2022            HEALTH RISK ASSESSMENT    Smoking Status:  Social History     Tobacco Use   Smoking Status Former   • Packs/day: 2.00   • Types: Cigarettes   Smokeless Tobacco Never   Tobacco Comments    Age: [age]     Alcohol Consumption:  Social History     Substance and Sexual Activity   Alcohol Use Not Currently    Comment: Light - Rarely as of 7/30/2020     Fall Risk Screen:    STEADI Fall Risk Assessment was completed, and patient is at LOW risk for falls.Assessment completed on:10/27/2022    Depression Screening:  PHQ-2/PHQ-9 Depression Screening 10/27/2022   Retired PHQ-9 Total Score -   Retired Total Score -   Little Interest or Pleasure in Doing Things 0-->not at all   Feeling Down, Depressed or Hopeless 0-->not at all   PHQ-9: Brief Depression Severity Measure Score 0       Health Habits and Functional and Cognitive Screening:  Functional & Cognitive Status 10/27/2022   Do you have difficulty preparing food and eating? No   Do " you have difficulty bathing yourself, getting dressed or grooming yourself? No   Do you have difficulty using the toilet? No   Do you have difficulty moving around from place to place? No   Do you have trouble with steps or getting out of a bed or a chair? No   Current Diet Well Balanced Diet   Dental Exam Other   Eye Exam Not up to date   Exercise (times per week) 7 times per week   Current Exercises Include Walking;Other   Do you need help using the phone?  No   Are you deaf or do you have serious difficulty hearing?  No   Do you need help with transportation? No   Do you need help shopping? No   Do you need help preparing meals?  No   Do you need help with housework?  No   Do you need help with laundry? No   Do you need help taking your medications? No   Do you need help managing money? No   Do you ever drive or ride in a car without wearing a seat belt? No   Have you felt unusual stress, anger or loneliness in the last month? No   Who do you live with? Alone   If you need help, do you have trouble finding someone available to you? No   Have you been bothered in the last four weeks by sexual problems? No   Do you have difficulty concentrating, remembering or making decisions? No       Age-appropriate Screening Schedule:  Refer to the list below for future screening recommendations based on patient's age, sex and/or medical conditions. Orders for these recommended tests are listed in the plan section. The patient has been provided with a written plan.    Health Maintenance   Topic Date Due   • ZOSTER VACCINE (1 of 2) Never done   • TDAP/TD VACCINES (1 - Tdap) 10/27/2023 (Originally 6/6/1974)   • LIPID PANEL  10/24/2023   • MAMMOGRAM  12/06/2023   • DXA SCAN  12/06/2023   • INFLUENZA VACCINE  Completed              Assessment & Plan   CMS Preventative Services Quick Reference  Risk Factors Identified During Encounter  Immunizations Discussed/Encouraged (specific Immunizations; Influenza and Prevnar 20 (Pneumococcal  20-valent conjugate)  The above risks/problems have been discussed with the patient.  Follow up actions/plans if indicated are seen below in the Assessment/Plan Section.  Pertinent information has been shared with the patient in the After Visit Summary.    Diagnoses and all orders for this visit:    1. Medicare annual wellness visit, subsequent (Primary)  Comments:  Recent blood work reviewed with patient    2. Flu vaccine need  -     Fluzone High-Dose 65+yrs ([id number])    3. Thyroid nodule    4. S/P thyroid biopsy    5. Urothelial cancer (HCC)    6. Malignant neoplasm of upper lobe of right lung (HCC)    7. H/O left nephrectomy    8. Prediabetes        Follow Up:   Return in about 6 months (around 4/27/2023) for Recheck, Next scheduled follow up.     An After Visit Summary and PPPS were made available to the patient.

## 2023-01-12 ENCOUNTER — ANESTHESIA (OUTPATIENT)
Dept: PERIOP | Facility: HOSPITAL | Age: 68
End: 2023-01-12
Payer: MEDICARE

## 2023-01-12 ENCOUNTER — HOSPITAL ENCOUNTER (OUTPATIENT)
Facility: HOSPITAL | Age: 68
Setting detail: HOSPITAL OUTPATIENT SURGERY
Discharge: HOME | End: 2023-01-12
Attending: UROLOGY | Admitting: UROLOGY
Payer: MEDICARE

## 2023-01-12 ENCOUNTER — APPOINTMENT (OUTPATIENT)
Dept: GENERAL RADIOLOGY | Facility: HOSPITAL | Age: 68
End: 2023-01-12
Payer: MEDICARE

## 2023-01-12 ENCOUNTER — TELEPHONE (OUTPATIENT)
Dept: UROLOGY | Facility: CLINIC | Age: 68
End: 2023-01-12

## 2023-01-12 ENCOUNTER — ANESTHESIA EVENT (OUTPATIENT)
Dept: PERIOP | Facility: HOSPITAL | Age: 68
End: 2023-01-12
Payer: MEDICARE

## 2023-01-12 VITALS
HEIGHT: 62 IN | HEART RATE: 73 BPM | RESPIRATION RATE: 20 BRPM | OXYGEN SATURATION: 100 % | TEMPERATURE: 96.7 F | DIASTOLIC BLOOD PRESSURE: 76 MMHG | BODY MASS INDEX: 27.18 KG/M2 | WEIGHT: 147.71 LBS | SYSTOLIC BLOOD PRESSURE: 118 MMHG

## 2023-01-12 DIAGNOSIS — C68.9 UROTHELIAL CANCER: ICD-10-CM

## 2023-01-12 PROCEDURE — 25010000002 MIDAZOLAM PER 1 MG: Performed by: ANESTHESIOLOGY

## 2023-01-12 PROCEDURE — 25010000002 PROPOFOL 10 MG/ML EMULSION: Performed by: NURSE ANESTHETIST, CERTIFIED REGISTERED

## 2023-01-12 PROCEDURE — 0 IOPAMIDOL PER 1 ML: Performed by: UROLOGY

## 2023-01-12 PROCEDURE — 52005 CYSTO W/URTRL CATHJ: CPT | Performed by: UROLOGY

## 2023-01-12 PROCEDURE — 25010000002 ONDANSETRON PER 1 MG: Performed by: NURSE ANESTHETIST, CERTIFIED REGISTERED

## 2023-01-12 PROCEDURE — 25010000002 LEVOFLOXACIN PER 250 MG: Performed by: UROLOGY

## 2023-01-12 PROCEDURE — C1758 CATHETER, URETERAL: HCPCS | Performed by: UROLOGY

## 2023-01-12 PROCEDURE — 74420 UROGRAPHY RTRGR +-KUB: CPT

## 2023-01-12 RX ORDER — LEVOFLOXACIN 5 MG/ML
500 INJECTION, SOLUTION INTRAVENOUS ONCE
Status: COMPLETED | OUTPATIENT
Start: 2023-01-12 | End: 2023-01-12

## 2023-01-12 RX ORDER — PROMETHAZINE HYDROCHLORIDE 25 MG/1
25 SUPPOSITORY RECTAL ONCE AS NEEDED
Status: DISCONTINUED | OUTPATIENT
Start: 2023-01-12 | End: 2023-01-12 | Stop reason: HOSPADM

## 2023-01-12 RX ORDER — MAGNESIUM HYDROXIDE 1200 MG/15ML
LIQUID ORAL AS NEEDED
Status: DISCONTINUED | OUTPATIENT
Start: 2023-01-12 | End: 2023-01-12 | Stop reason: HOSPADM

## 2023-01-12 RX ORDER — SODIUM CHLORIDE, SODIUM LACTATE, POTASSIUM CHLORIDE, CALCIUM CHLORIDE 600; 310; 30; 20 MG/100ML; MG/100ML; MG/100ML; MG/100ML
9 INJECTION, SOLUTION INTRAVENOUS CONTINUOUS PRN
Status: DISCONTINUED | OUTPATIENT
Start: 2023-01-12 | End: 2023-01-12 | Stop reason: HOSPADM

## 2023-01-12 RX ORDER — ONDANSETRON 2 MG/ML
INJECTION INTRAMUSCULAR; INTRAVENOUS AS NEEDED
Status: DISCONTINUED | OUTPATIENT
Start: 2023-01-12 | End: 2023-01-12 | Stop reason: SURG

## 2023-01-12 RX ORDER — MIDAZOLAM HYDROCHLORIDE 1 MG/ML
2 INJECTION INTRAMUSCULAR; INTRAVENOUS ONCE
Status: COMPLETED | OUTPATIENT
Start: 2023-01-12 | End: 2023-01-12

## 2023-01-12 RX ORDER — ONDANSETRON 2 MG/ML
4 INJECTION INTRAMUSCULAR; INTRAVENOUS ONCE AS NEEDED
Status: DISCONTINUED | OUTPATIENT
Start: 2023-01-12 | End: 2023-01-12 | Stop reason: HOSPADM

## 2023-01-12 RX ORDER — ACETAMINOPHEN 325 MG/1
650 TABLET ORAL ONCE
Status: DISCONTINUED | OUTPATIENT
Start: 2023-01-12 | End: 2023-01-12 | Stop reason: HOSPADM

## 2023-01-12 RX ORDER — IBUPROFEN 600 MG/1
600 TABLET ORAL EVERY 6 HOURS PRN
Status: DISCONTINUED | OUTPATIENT
Start: 2023-01-12 | End: 2023-01-12 | Stop reason: HOSPADM

## 2023-01-12 RX ORDER — PROMETHAZINE HYDROCHLORIDE 12.5 MG/1
12.5 TABLET ORAL ONCE AS NEEDED
Status: DISCONTINUED | OUTPATIENT
Start: 2023-01-12 | End: 2023-01-12 | Stop reason: HOSPADM

## 2023-01-12 RX ORDER — MEPERIDINE HYDROCHLORIDE 25 MG/ML
12.5 INJECTION INTRAMUSCULAR; INTRAVENOUS; SUBCUTANEOUS
Status: DISCONTINUED | OUTPATIENT
Start: 2023-01-12 | End: 2023-01-12 | Stop reason: HOSPADM

## 2023-01-12 RX ORDER — PROPOFOL 10 MG/ML
VIAL (ML) INTRAVENOUS AS NEEDED
Status: DISCONTINUED | OUTPATIENT
Start: 2023-01-12 | End: 2023-01-12 | Stop reason: SURG

## 2023-01-12 RX ORDER — OXYCODONE HYDROCHLORIDE 5 MG/1
5 TABLET ORAL
Status: DISCONTINUED | OUTPATIENT
Start: 2023-01-12 | End: 2023-01-12 | Stop reason: HOSPADM

## 2023-01-12 RX ORDER — EPHEDRINE SULFATE 50 MG/ML
INJECTION, SOLUTION INTRAVENOUS AS NEEDED
Status: DISCONTINUED | OUTPATIENT
Start: 2023-01-12 | End: 2023-01-12 | Stop reason: SURG

## 2023-01-12 RX ORDER — SODIUM CHLORIDE 9 MG/ML
100 INJECTION, SOLUTION INTRAVENOUS CONTINUOUS
Status: DISCONTINUED | OUTPATIENT
Start: 2023-01-12 | End: 2023-01-12 | Stop reason: HOSPADM

## 2023-01-12 RX ORDER — PROMETHAZINE HYDROCHLORIDE 12.5 MG/1
25 TABLET ORAL ONCE AS NEEDED
Status: DISCONTINUED | OUTPATIENT
Start: 2023-01-12 | End: 2023-01-12 | Stop reason: HOSPADM

## 2023-01-12 RX ORDER — ACETAMINOPHEN 500 MG
1000 TABLET ORAL ONCE
Status: COMPLETED | OUTPATIENT
Start: 2023-01-12 | End: 2023-01-12

## 2023-01-12 RX ORDER — LIDOCAINE HYDROCHLORIDE 20 MG/ML
INJECTION, SOLUTION EPIDURAL; INFILTRATION; INTRACAUDAL; PERINEURAL AS NEEDED
Status: DISCONTINUED | OUTPATIENT
Start: 2023-01-12 | End: 2023-01-12 | Stop reason: SURG

## 2023-01-12 RX ADMIN — EPHEDRINE SULFATE 12.5 MG: 50 INJECTION INTRAVENOUS at 08:31

## 2023-01-12 RX ADMIN — MIDAZOLAM HYDROCHLORIDE 2 MG: 2 INJECTION, SOLUTION INTRAMUSCULAR; INTRAVENOUS at 07:57

## 2023-01-12 RX ADMIN — SODIUM CHLORIDE, POTASSIUM CHLORIDE, SODIUM LACTATE AND CALCIUM CHLORIDE 9 ML/HR: 600; 310; 30; 20 INJECTION, SOLUTION INTRAVENOUS at 07:36

## 2023-01-12 RX ADMIN — LEVOFLOXACIN 500 MG: 500 INJECTION, SOLUTION INTRAVENOUS at 08:13

## 2023-01-12 RX ADMIN — LIDOCAINE HYDROCHLORIDE 60 MG: 20 INJECTION, SOLUTION EPIDURAL; INFILTRATION; INTRACAUDAL; PERINEURAL at 08:12

## 2023-01-12 RX ADMIN — ONDANSETRON 4 MG: 2 INJECTION INTRAMUSCULAR; INTRAVENOUS at 08:30

## 2023-01-12 RX ADMIN — PROPOFOL 150 MCG/KG/MIN: 10 INJECTION, EMULSION INTRAVENOUS at 08:18

## 2023-01-12 RX ADMIN — ACETAMINOPHEN 1000 MG: 500 TABLET ORAL at 07:36

## 2023-01-12 RX ADMIN — PROPOFOL 70 MG: 10 INJECTION, EMULSION INTRAVENOUS at 08:12

## 2023-01-12 NOTE — DISCHARGE INSTRUCTIONS
DISCHARGE INSTRUCTIONS CYSTOSCOPY      For your surgery you had:  Monitored anesthesia care  You may experience dizziness, drowsiness, or lightheadedness for several hours following surgery.  Do not stay alone today or tonight.  Limit your activity for 24 hours.  You should not drive, operate machinery, drink alcohol, or sign legally binding documents for 24 hours or while you are taking pain medication.  Resume your diet slowly.  Follow any special dietary instructions you may have been given by your doctor.    NOTIFY YOUR DOCTOR IF YOU EXPERIENCE ANY OF THE FOLLOWING:  Temperature greater than 101 degrees Fahrenheit  Shaking Chills  Redness or excessive drainage from incision  Nausea, vomiting and/or pain that is not controlled by prescribed medications  Increase in bleeding or bleeding that is excessive  Unable to urinate in 6 hours after surgery  If unable to reach your doctor, please go to the closest Emergency Room   Following your cystoscopy exam, you may experience burning upon urination.  You may also pass some bloody urine.  If the burning sensation and/or bloody urine should persist beyond 48 hours, call your doctor.  To encourage kidney and bladder function, you should drink as much fluid as possible.  If you have difficulty urinating, try sitting in a bath tub of warm water.  If you become uncomfortable because you cannot urinate, call your doctor or come to the Emergency Room at the hospital.  Medications per physician instructions as indicated on Discharge Medication Information Sheet.      SPECIAL INSTRUCTIONS:         Last dose of pain medication given at:   Tylenol (1000mg) last at 7:36am. Do not exceed 4000mg of tylenol in a 24 hour period.       DISCHARGE INSTRUCTIONS CYSTOSCOPY      For your surgery you had:  General anesthesia (you may have a sore throat for the first 24 hours)  IV sedation  Local anesthesia  Monitored anesthesia care  You received a medicated patch for nausea prevention today  (behind your ear). It is recommended that you remove it 24-48 hours post-operatively. It must be removed within 72 hours.  You have received an anesthesia medication today that can cause hormonal forms of birth control to be ineffective. You should use a different form of birth control (to prevent pregnancy) for 7 days.   You may experience dizziness, drowsiness, or lightheadedness for several hours following surgery.  Do not stay alone today or tonight.  Limit your activity for 24 hours.  You should not drive, operate machinery, drink alcohol, or sign legally binding documents for 24 hours or while you are taking pain medication.  Resume your diet slowly.  Follow any special dietary instructions you may have been given by your doctor.  Last dose of pain medication given at:   .  NOTIFY YOUR DOCTOR IF YOU EXPERIENCE ANY OF THE FOLLOWING:  Temperature greater than 101 degrees Fahrenheit  Shaking Chills  Redness or excessive drainage from incision  Nausea, vomiting and/or pain that is not controlled by prescribed medications  Increase in bleeding or bleeding that is excessive  Unable to urinate in 6 hours after surgery  If unable to reach your doctor, please go to the closest Emergency Room   Following your cystoscopy exam, you may experience burning upon urination.  You may also pass some bloody urine.  If the burning sensation and/or bloody urine should persist beyond 48 hours, call your doctor.  To encourage kidney and bladder function, you should drink as much fluid as possible.  If you have difficulty urinating, try sitting in a bath tub of warm water.  If you become uncomfortable because you cannot urinate, call your doctor or come to the Emergency Room at the hospital.  Medications per physician instructions as indicated on Discharge Medication Information Sheet.  You should see   for follow-up care  on   .  Phone number:        SPECIAL INSTRUCTIONS:  No tylenol until 11:30

## 2023-01-12 NOTE — TELEPHONE ENCOUNTER
Spoke with PT and scheduled her 6 month office cystoscopy on July 19 at 1:30. She verbalized understanding.

## 2023-01-12 NOTE — OP NOTE
CYSTOSCOPY RETROGRADE PYELOGRAM  Procedure Report    Patient Name:  Neda Núñez  YOB: 1955    Date of Surgery:  1/12/2023     Pre-op Diagnosis:   Urothelial cancer (HCC) [C68.9]       Post-Op Diagnosis Codes:     * Urothelial cancer (HCC) [C68.9]      Procedure/CPT® Codes:    Procedure(s):  BILATERAL CYSTOSCOPY RETROGRADE PYELOGRAM      Staff:  Surgeon(s):  Kadie Vu MD         Anesthesia: Sedation    Estimated Blood Loss: 0 mL    Implants:    Nothing was implanted during the procedure    Specimen:          None        Complications: None    Description of Procedure:     After proper consent was obtained, patient was taken to operating room and MAC anesthesia was performed.  The patient was placed in the dorsal lithotomy position and prepped and draped in the normal sterile fashion for cystoscopy.      A 22 [place] rigid cystoscope was inserted into the bladder.  The bladder was inspected in a systemic meridian fashion using a 30  degree lens. There were no tumors, lesions, stones or other abnormalities seen.  Both ureteral orifices were normal in appearance.      Bilateral retrograde pyelograms were then performed, first on the patient's left side and then on the right.  On the left, there was only a very distal bit of ureter consistent with her nephroureterectomy.  On the right, there were no filling defects, tumors, stones or other abnormalities seen.  The bladder was emptied and the cystoscope removed.      The patient tolerated the procedure well and was transferred to the PACU in stable condition.        Kadie Vu MD     Date: 1/12/2023  Time: 08:49 EST

## 2023-01-12 NOTE — H&P
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      Personal History     Past Medical History:   Diagnosis Date   • Allergies    • Ear problem    • Lung cancer (HCC)    • Reflux esophagitis    • Ureteral mass    • Urothelial cancer (HCC) 2020    (Left)   • Vocal cord dysfunction    • Wax in ear        Past Surgical History:   Procedure Laterality Date   •  SECTION     • CYSTOSCOPY URETERAL BIOPSY     • CYSTOSCOPY, URETEROSCOPY, RETROGRADE PYELOGRAM, STENT INSERTION     • KIDNEY SURGERY     • LEG SURGERY      ignacio and pins    • LUNG SURGERY Right     upper lobe removed   • NEPHRECTOMY Left    • TRANSURETHRAL RESECTION OF BLADDER TUMOR N/A 2021    Procedure: CYSTOSCOPY TRANSURETHRAL RESECTION OF BLADDER TUMOR; CYSTOSCOPY RETROGRADE PYELOGRAM right;  Surgeon: Kadie Vu MD;  Location: [facility];  Service: Urology;  Laterality: N/A;   • URETERECTOMY      at the same time Nephrectomy       Family History: family history includes Breast cancer in her paternal grandmother; Cancer in an other family member; Diabetes in her brother and maternal grandfather; Lung cancer in an other family member; Migraines in an other family member. Otherwise pertinent FHx was reviewed and not pertinent to current issue.    Social History:  reports that she has quit smoking. Her smoking use included cigarettes. She smoked an average of 2 packs per day. She has never used smokeless tobacco. She reports that she does not currently use alcohol. She reports that she does not use drugs.    Home Medications:  Biotin, cholecalciferol, multivitamin with minerals, and zinc sulfate    Allergies:  Allergies    Allergen Reactions   • Penicillins Unknown - High Severity     Tongue swelling   • Sulfa Antibiotics Unknown - High Severity     Itching/Rash       Objective    Objective     Vitals:   Temp:  [97.4 °F (36.3 °C)] 97.4 °F (36.3 °C)  Heart Rate:  [78] 78  Resp:  [18] 18  BP: (146)/(86) 146/86    Physical Exam  Constitutional:       Appearance: Normal appearance.   Cardiovascular:      Rate and Rhythm: Normal rate and regular rhythm.   Pulmonary:      Effort: Pulmonary effort is normal.      Breath sounds: Normal breath sounds.   Neurological:      Mental Status: She is alert. Mental status is at baseline.   Psychiatric:         Mood and Affect: Mood and affect normal.         Speech: Speech normal.         Judgment: Judgment normal.         Result Review    Result Review:  I have personally reviewed the results from the time of this admission to 1/12/2023 07:22 EST and agree with these findings:  [x]  Laboratory  []  Microbiology  [x]  Radiology  []  EKG/Telemetry   []  Cardiology/Vascular   [x]  Pathology  [x]  Old records  []  Other:      Assessment & Plan   Assessment / Plan       Active Hospital Problems:  Active Hospital Problems    Diagnosis    • **Urothelial cancer (HCC)        Plan: Cystoscopy and bilateral retrograde pyelograms.    Risks and benefits discussed with patient and they are agreeable to proceed.    DVT prophylaxis:  No DVT prophylaxis order currently exists.    CODE STATUS:

## 2023-02-01 ENCOUNTER — HOSPITAL ENCOUNTER (OUTPATIENT)
Dept: CT IMAGING | Facility: HOSPITAL | Age: 68
Discharge: HOME | End: 2023-02-01
Payer: MEDICARE

## 2023-02-01 DIAGNOSIS — C68.9 UROTHELIAL CANCER: ICD-10-CM

## 2023-02-01 LAB
CREAT BLDA-MCNC: 1 MG/DL
EGFRCR SERPLBLD CKD-EPI 2021: 62.3 ML/MIN/1.73

## 2023-02-01 PROCEDURE — 74177 CT ABD & PELVIS W/CONTRAST: CPT

## 2023-02-01 PROCEDURE — 71260 CT THORAX DX C+: CPT

## 2023-02-01 PROCEDURE — 82565 ASSAY OF CREATININE: CPT

## 2023-02-01 PROCEDURE — 0 IOPAMIDOL PER 1 ML: Performed by: INTERNAL MEDICINE

## 2023-02-01 RX ADMIN — IOPAMIDOL 100 ML: 755 INJECTION, SOLUTION INTRAVENOUS at 09:52

## 2023-02-15 ENCOUNTER — OFFICE VISIT (OUTPATIENT)
Dept: ONCOLOGY | Facility: HOSPITAL | Age: 68
End: 2023-02-15
Payer: MEDICARE

## 2023-02-15 VITALS
BODY MASS INDEX: 27.34 KG/M2 | WEIGHT: 148.59 LBS | OXYGEN SATURATION: 99 % | DIASTOLIC BLOOD PRESSURE: 75 MMHG | SYSTOLIC BLOOD PRESSURE: 135 MMHG | TEMPERATURE: 97.7 F | RESPIRATION RATE: 18 BRPM | HEART RATE: 83 BPM | HEIGHT: 62 IN

## 2023-02-15 DIAGNOSIS — C68.9 UROTHELIAL CANCER: ICD-10-CM

## 2023-02-15 DIAGNOSIS — C34.11 MALIGNANT NEOPLASM OF UPPER LOBE OF RIGHT LUNG: Primary | ICD-10-CM

## 2023-02-15 PROCEDURE — 99213 OFFICE O/P EST LOW 20 MIN: CPT | Performed by: INTERNAL MEDICINE

## 2023-02-15 PROCEDURE — G0463 HOSPITAL OUTPT CLINIC VISIT: HCPCS | Performed by: INTERNAL MEDICINE

## 2023-02-15 NOTE — PROGRESS NOTES
Patient  Neda Núñez    Location  [facility] HEMATOLOGY & ONCOLOGY    Chief Complaint  Urothelial cancer (HCC)    Referring Provider: Jeff Art MD  PCP: Jeff Art MD    Subjective          Oncology/Hematology History Overview Note   Urothelial Carcinoma of the Renal Pelvis:  - s/p left nephroureterectomy in Sept 2020  - pathology showed papillary urothelial carcinoma, invasive, high grade, invading into the renal parenchyma, margins negative, no lymph nodes submitted, pT3Nx    RUL Lung Cancer:  - 2014 at an OSH with NSCLC  - treated with surgery and chemotherapy     Lung cancer (HCC)   10/25/2021 Initial Diagnosis    Lung cancer (HCC)         HPI  Patient comes in today to get the results of her recent CT scan.  Fortunately this shows no evidence of disease recurrence.  We discussed the plan for monitoring her disease going forward.  I gave her the option of having 1 more 6-month scan versus a transition to annual scanning.  She is about 2-1/2 years out from her surgery for urothelial carcinoma.  She says she feels very comfortable getting annual scan because she is competent her cancer will not return.  She feels well and has no new complaints or concerns.    Review of Systems   Constitutional: Negative for appetite change, diaphoresis, fatigue, fever, unexpected weight gain and unexpected weight loss.   HENT: Negative for hearing loss, mouth sores, sore throat, swollen glands, trouble swallowing and voice change.    Eyes: Negative for blurred vision.   Respiratory: Negative for cough, shortness of breath and wheezing.    Cardiovascular: Negative for chest pain and palpitations.   Gastrointestinal: Negative for abdominal pain, blood in stool, constipation, diarrhea, nausea and vomiting.   Endocrine: Negative for cold intolerance and heat intolerance.   Genitourinary: Positive for pelvic pain (RT side off and on). Negative for difficulty urinating, dysuria, frequency, hematuria and urinary  incontinence.   Musculoskeletal: Negative for arthralgias, back pain and myalgias.   Skin: Negative for rash, skin lesions and wound.   Neurological: Negative for dizziness, seizures, weakness, numbness and headache.   Hematological: Does not bruise/bleed easily.   Psychiatric/Behavioral: Negative for depressed mood. The patient is not nervous/anxious.    All other systems reviewed and are negative.      Past Medical History:   Diagnosis Date   • Allergies    • Ear problem    • Lung cancer (HCC)    • Reflux esophagitis    • Ureteral mass    • Urothelial cancer (HCC) 2020    (Left)   • Vocal cord dysfunction    • Wax in ear      Past Surgical History:   Procedure Laterality Date   •  SECTION     • CYSTOSCOPY RETROGRADE PYELOGRAM Bilateral 2023    Procedure: BILATERAL CYSTOSCOPY RETROGRADE PYELOGRAM;  Surgeon: Kadie Vu MD;  Location: [facility];  Service: Urology;  Laterality: Bilateral;   • CYSTOSCOPY URETERAL BIOPSY     • CYSTOSCOPY, URETEROSCOPY, RETROGRADE PYELOGRAM, STENT INSERTION     • KIDNEY SURGERY     • LEG SURGERY      ignacio and pins    • LUNG SURGERY Right     upper lobe removed   • NEPHRECTOMY Left    • TRANSURETHRAL RESECTION OF BLADDER TUMOR N/A 2021    Procedure: CYSTOSCOPY TRANSURETHRAL RESECTION OF BLADDER TUMOR; CYSTOSCOPY RETROGRADE PYELOGRAM right;  Surgeon: Kadie Vu MD;  Location: [facility];  Service: Urology;  Laterality: N/A;   • URETERECTOMY      at the same time Nephrectomy     Social History     Socioeconomic History   • Marital status:    • Number of children: 2   Tobacco Use   • Smoking status: Former     Packs/day: 2.00     Types: Cigarettes   • Smokeless tobacco: Never   • Tobacco comments:     Age: [age], quit 12 years ago   Vaping Use   • Vaping Use: Never used   Substance and Sexual Activity   • Alcohol use: Not Currently     Comment: Light - Rarely as of 2020   • Drug use: Never   • Sexual activity: Defer     Family  "History   Problem Relation Age of Onset   • Diabetes Brother         Unspecified Type   • Diabetes Maternal Grandfather         Unspecified Type   • Breast cancer Paternal Grandmother    • Cancer Other         Unspecified   • Migraines Other         Migraine Headaches   • Lung cancer Other    • Malig Hyperthermia Neg Hx        Objective   Physical Exam  General: Alert, cooperative, no acute distress, well-appearing  Eyes: Anicteric sclera, PERRLA  Respiratory: normal respiratory effort  Cardiovascular: no lower extremity edema  Skin: Normal tone, no rash, no lesions  Psychiatric: Appropriate affect, intact judgment  Neurologic: No focal sensory or motor deficits, normal cognition   Musculoskeletal: Normal muscle strength and tone  Extremities: No clubbing, cyanosis, or deformities    Vitals:    02/15/23 1446   BP: 135/75   Pulse: 83   Resp: 18   Temp: 97.7 °F (36.5 °C)   SpO2: 99%   Weight: 67.4 kg (148 lb 9.4 oz)   Height: 157.5 cm (62.01\")   PainSc: 0-No pain     ECOG score: 0         PHQ-9 Total Score:         Result Review :   The following data was reviewed by: Rochelle Frankel MD PhD on 02/15/2023:  Lab Results   Component Value Date    HGB 13.5 10/24/2022    HCT 40.7 10/24/2022    MCV 92.1 10/24/2022     10/24/2022    WBC 6.55 10/24/2022    NEUTROABS 3.35 10/24/2022    LYMPHSABS 2.50 10/24/2022    MONOSABS 0.38 10/24/2022    EOSABS 0.24 10/24/2022    BASOSABS 0.04 10/24/2022     Lab Results   Component Value Date    GLUCOSE 99 10/24/2022    BUN 13 10/24/2022    CREATININE 1.00 02/01/2023     10/24/2022    K 4.1 10/24/2022     10/24/2022    CO2 26.3 10/24/2022    CALCIUM 9.4 10/24/2022    PROTEINTOT 6.5 10/24/2022    ALBUMIN 4.20 10/24/2022    BILITOT 0.4 10/24/2022    ALKPHOS 43 10/24/2022    AST 20 10/24/2022    ALT 24 10/24/2022          Assessment and Plan    Diagnoses and all orders for this visit:    1. Urothelial cancer (HCC)        Upper tract urothelial carcinoma: Status post left " nephrectomy.  Patient's recent staging CT scans show no evidence of disease recurrence.  She continues to follow-up with Dr. Vu in urology.  I will follow up with her in 1 year for restaging CT CAP with contrast.    Patient was given instructions and counseling regarding her condition or for health maintenance advice. Please see specific information pulled into the AVS if appropriate.

## 2023-06-02 ENCOUNTER — APPOINTMENT (OUTPATIENT)
Dept: GENERAL RADIOLOGY | Facility: HOSPITAL | Age: 68
End: 2023-06-02
Payer: MEDICARE

## 2023-06-02 ENCOUNTER — HOSPITAL ENCOUNTER (EMERGENCY)
Facility: HOSPITAL | Age: 68
Discharge: HOME | End: 2023-06-02
Attending: EMERGENCY MEDICINE
Payer: MEDICARE

## 2023-06-02 VITALS
WEIGHT: 144.62 LBS | HEIGHT: 62 IN | DIASTOLIC BLOOD PRESSURE: 86 MMHG | HEART RATE: 67 BPM | BODY MASS INDEX: 26.61 KG/M2 | OXYGEN SATURATION: 96 % | TEMPERATURE: 98 F | SYSTOLIC BLOOD PRESSURE: 174 MMHG | RESPIRATION RATE: 16 BRPM

## 2023-06-02 DIAGNOSIS — I95.1 ORTHOSTATIC HYPOTENSION: ICD-10-CM

## 2023-06-02 DIAGNOSIS — E86.0 DEHYDRATION: Primary | ICD-10-CM

## 2023-06-02 LAB
ALBUMIN SERPL-MCNC: 4.1 G/DL (ref 3.5–5.2)
ALBUMIN/GLOB SERPL: 1.5 G/DL
ALP SERPL-CCNC: 50 U/L (ref 39–117)
ALT SERPL W P-5'-P-CCNC: 21 U/L (ref 1–33)
ANION GAP SERPL CALCULATED.3IONS-SCNC: 10.2 MMOL/L (ref 5–15)
AST SERPL-CCNC: 14 U/L (ref 1–32)
BASOPHILS # BLD AUTO: 0.05 10*3/MM3 (ref 0–0.2)
BASOPHILS NFR BLD AUTO: 0.6 % (ref 0–1.5)
BILIRUB SERPL-MCNC: 0.4 MG/DL (ref 0–1.2)
BUN SERPL-MCNC: 10 MG/DL (ref 8–23)
BUN/CREAT SERPL: 12 (ref 7–25)
CALCIUM SPEC-SCNC: 9.4 MG/DL (ref 8.6–10.5)
CHLORIDE SERPL-SCNC: 104 MMOL/L (ref 98–107)
CO2 SERPL-SCNC: 23.8 MMOL/L (ref 22–29)
CREAT SERPL-MCNC: 0.83 MG/DL (ref 0.57–1)
DEPRECATED RDW RBC AUTO: 43.4 FL (ref 37–54)
EGFRCR SERPLBLD CKD-EPI 2021: 77.9 ML/MIN/1.73
EOSINOPHIL # BLD AUTO: 0.36 10*3/MM3 (ref 0–0.4)
EOSINOPHIL NFR BLD AUTO: 4.4 % (ref 0.3–6.2)
ERYTHROCYTE [DISTWIDTH] IN BLOOD BY AUTOMATED COUNT: 12.8 % (ref 12.3–15.4)
GLOBULIN UR ELPH-MCNC: 2.8 GM/DL
GLUCOSE SERPL-MCNC: 106 MG/DL (ref 65–99)
HCT VFR BLD AUTO: 44.8 % (ref 34–46.6)
HGB BLD-MCNC: 14.4 G/DL (ref 12–15.9)
HOLD SPECIMEN: NORMAL
HOLD SPECIMEN: NORMAL
IMM GRANULOCYTES # BLD AUTO: 0.02 10*3/MM3 (ref 0–0.05)
IMM GRANULOCYTES NFR BLD AUTO: 0.2 % (ref 0–0.5)
LYMPHOCYTES # BLD AUTO: 2.47 10*3/MM3 (ref 0.7–3.1)
LYMPHOCYTES NFR BLD AUTO: 29.9 % (ref 19.6–45.3)
MAGNESIUM SERPL-MCNC: 2 MG/DL (ref 1.6–2.4)
MCH RBC QN AUTO: 29.8 PG (ref 26.6–33)
MCHC RBC AUTO-ENTMCNC: 32.1 G/DL (ref 31.5–35.7)
MCV RBC AUTO: 92.8 FL (ref 79–97)
MONOCYTES # BLD AUTO: 0.58 10*3/MM3 (ref 0.1–0.9)
MONOCYTES NFR BLD AUTO: 7 % (ref 5–12)
NEUTROPHILS NFR BLD AUTO: 4.77 10*3/MM3 (ref 1.7–7)
NEUTROPHILS NFR BLD AUTO: 57.9 % (ref 42.7–76)
NRBC BLD AUTO-RTO: 0 /100 WBC (ref 0–0.2)
PLATELET # BLD AUTO: 367 10*3/MM3 (ref 140–450)
PMV BLD AUTO: 10 FL (ref 6–12)
POTASSIUM SERPL-SCNC: 4.2 MMOL/L (ref 3.5–5.2)
PROT SERPL-MCNC: 6.9 G/DL (ref 6–8.5)
RBC # BLD AUTO: 4.83 10*6/MM3 (ref 3.77–5.28)
SODIUM SERPL-SCNC: 138 MMOL/L (ref 136–145)
TROPONIN T SERPL HS-MCNC: <6 NG/L
WBC NRBC COR # BLD: 8.25 10*3/MM3 (ref 3.4–10.8)
WHOLE BLOOD HOLD COAG: NORMAL
WHOLE BLOOD HOLD SPECIMEN: NORMAL

## 2023-06-02 PROCEDURE — 93005 ELECTROCARDIOGRAM TRACING: CPT | Performed by: EMERGENCY MEDICINE

## 2023-06-02 PROCEDURE — 83735 ASSAY OF MAGNESIUM: CPT

## 2023-06-02 PROCEDURE — 99284 EMERGENCY DEPT VISIT MOD MDM: CPT

## 2023-06-02 PROCEDURE — 71045 X-RAY EXAM CHEST 1 VIEW: CPT

## 2023-06-02 PROCEDURE — 80053 COMPREHEN METABOLIC PANEL: CPT

## 2023-06-02 PROCEDURE — 36415 COLL VENOUS BLD VENIPUNCTURE: CPT

## 2023-06-02 PROCEDURE — 84484 ASSAY OF TROPONIN QUANT: CPT

## 2023-06-02 PROCEDURE — 93005 ELECTROCARDIOGRAM TRACING: CPT

## 2023-06-02 PROCEDURE — 85025 COMPLETE CBC W/AUTO DIFF WBC: CPT

## 2023-06-02 RX ORDER — SODIUM CHLORIDE 0.9 % (FLUSH) 0.9 %
10 SYRINGE (ML) INJECTION AS NEEDED
Status: DISCONTINUED | OUTPATIENT
Start: 2023-06-02 | End: 2023-06-02 | Stop reason: HOSPADM

## 2023-06-02 RX ADMIN — SODIUM CHLORIDE 500 ML: 9 INJECTION, SOLUTION INTRAVENOUS at 15:03

## 2023-06-02 NOTE — ED PROVIDER NOTES
Time: 2:13 PM EDT  Date of encounter:  2023  Independent Historian/Clinical History and Information was obtained by:   Patient  Chief Complaint   Patient presents with   • Dizziness   • Dehydration     Sent by PCP       History is limited by: N/A    History of Present Illness:  Patient is a 67 y.o. year old female who presents to the emergency department for evaluation of dizziness and lightheadedness. Sent from PCP. Patient reports for last three days, when she sits or stands, she feels lightheaded and dizzy and thinks she is dehydrated. States her BP was low at her PCP office today and they told her to come here for evaluation. Hx of lung CA and renal CA with nephrectomy two years ago. Denies fever/chills. Denies n/v/d, chest pain or SOB. She has not had anything to drink since coming to the hospital.    HPI    Patient Care Team  Primary Care Provider: Renetta Talbert MD    Past Medical History:     Allergies   Allergen Reactions   • Penicillins Unknown - High Severity     Tongue swelling   • Sulfa Antibiotics Unknown - High Severity     Itching/Rash     Past Medical History:   Diagnosis Date   • Allergies    • Ear problem    • Lung cancer    • Reflux esophagitis    • Ureteral mass    • Urothelial cancer 2020    (Left)   • Vocal cord dysfunction    • Wax in ear      Past Surgical History:   Procedure Laterality Date   •  SECTION     • CYSTOSCOPY RETROGRADE PYELOGRAM Bilateral 2023    Procedure: BILATERAL CYSTOSCOPY RETROGRADE PYELOGRAM;  Surgeon: Kadie Vu MD;  Location: [facility] OR;  Service: Urology;  Laterality: Bilateral;   • CYSTOSCOPY URETERAL BIOPSY     • CYSTOSCOPY, URETEROSCOPY, RETROGRADE PYELOGRAM, STENT INSERTION     • KIDNEY SURGERY     • LEG SURGERY      ignacio and pins    • LUNG SURGERY Right     upper lobe removed   • NEPHRECTOMY Left    • TRANSURETHRAL RESECTION OF BLADDER TUMOR N/A 2021    Procedure: CYSTOSCOPY TRANSURETHRAL RESECTION OF BLADDER TUMOR;  CYSTOSCOPY RETROGRADE PYELOGRAM right;  Surgeon: Kadie Vu MD;  Location: [facility] MAIN OR;  Service: Urology;  Laterality: N/A;   • URETERECTOMY      at the same time Nephrectomy     Family History   Problem Relation Age of Onset   • Diabetes Brother         Unspecified Type   • Diabetes Maternal Grandfather         Unspecified Type   • Breast cancer Paternal Grandmother    • Cancer Other         Unspecified   • Migraines Other         Migraine Headaches   • Lung cancer Other    • Malig Hyperthermia Neg Hx        Home Medications:  Prior to Admission medications    Medication Sig Start Date End Date Taking? Authorizing Provider   BIOTIN PO Take  by mouth.    Marc Roman MD   cholecalciferol (VITAMIN D3) 25 MCG (1000 UT) tablet Take 1,000 Units by mouth Daily.    Marc Roman MD   multivitamin with minerals tablet tablet Take 1 tablet by mouth Daily.    Marc Roman MD   zinc sulfate (ZINCATE) 220 (50 Zn) MG capsule Take 220 mg by mouth Daily.    Marc Roman MD        Social History:   Social History     Tobacco Use   • Smoking status: Former     Packs/day: 2.00     Types: Cigarettes   • Smokeless tobacco: Never   • Tobacco comments:     Age: [age], quit 12 years ago   Vaping Use   • Vaping Use: Never used   Substance Use Topics   • Alcohol use: Not Currently     Comment: Light - Rarely as of 7/30/2020   • Drug use: Never         Review of Systems:  Review of Systems   Constitutional: Negative for chills and fever.   HENT: Negative for congestion, rhinorrhea and sore throat.    Eyes: Negative for pain and visual disturbance.   Respiratory: Negative for apnea, cough, chest tightness and shortness of breath.    Cardiovascular: Negative for chest pain and palpitations.   Gastrointestinal: Negative for abdominal pain, diarrhea, nausea and vomiting.   Genitourinary: Negative for difficulty urinating and dysuria.   Musculoskeletal: Negative for joint swelling and myalgias.  "  Skin: Negative for color change.   Neurological: Positive for dizziness and light-headedness. Negative for seizures, syncope and headaches.   Psychiatric/Behavioral: Negative.    All other systems reviewed and are negative.       Physical Exam:  /86   Pulse 67   Temp 98 °F (36.7 °C) (Oral)   Resp 16   Ht 157.5 cm (62\")   Wt 65.6 kg (144 lb 10 oz)   SpO2 96%   BMI 26.45 kg/m²     Physical Exam  Vitals and nursing note reviewed.   Constitutional:       General: She is not in acute distress.     Appearance: Normal appearance. She is not toxic-appearing.   HENT:      Head: Normocephalic and atraumatic.      Jaw: There is normal jaw occlusion.      Mouth/Throat:      Mouth: Mucous membranes are moist.   Eyes:      General: Lids are normal.      Extraocular Movements: Extraocular movements intact.      Conjunctiva/sclera: Conjunctivae normal.      Pupils: Pupils are equal, round, and reactive to light.   Cardiovascular:      Rate and Rhythm: Normal rate and regular rhythm.      Pulses: Normal pulses.      Heart sounds: Normal heart sounds.   Pulmonary:      Effort: Pulmonary effort is normal. No respiratory distress.      Breath sounds: Normal breath sounds. No wheezing or rhonchi.   Abdominal:      General: Abdomen is flat.      Palpations: Abdomen is soft.      Tenderness: There is no abdominal tenderness. There is no guarding or rebound.   Musculoskeletal:         General: Normal range of motion.      Cervical back: Normal range of motion and neck supple.      Right lower leg: No edema.      Left lower leg: No edema.   Skin:     General: Skin is warm and dry.   Neurological:      General: No focal deficit present.      Mental Status: She is alert and oriented to person, place, and time. Mental status is at baseline.   Psychiatric:         Mood and Affect: Mood normal.         Behavior: Behavior normal.                  Procedures:  Procedures      Medical Decision Making:      Comorbidities that affect " care:    Cancer    External Notes reviewed:    Previous Clinic Note: Oncology follow-up for remote right upper lobe lung cancer      The following orders were placed and all results were independently analyzed by me:  Orders Placed This Encounter   Procedures   • XR Chest 1 View   • [place] Draw   • Comprehensive Metabolic Panel   • Single High Sensitivity Troponin T   • Magnesium   • CBC Auto Differential   • NPO Diet NPO Type: Strict NPO   • Undress & Gown   • Continuous Pulse Oximetry   • Vital Signs   • Orthostatic Blood Pressure   • Orthostatic Vitals   • Oxygen Therapy- Nasal Cannula; Titrate 1-6 LPM Per SpO2; 90 - 95%   • POC Glucose Once   • ECG 12 Lead ED Triage Standing Order; Weak / Dizzy / AMS   • Insert Peripheral IV   • Insert Peripheral IV   • Fall Precautions   • CBC & Differential   • Green Top (Gel)   • Lavender Top   • Gold Top - SST   • Light Blue Top       Medications Given in the Emergency Department:  Medications   sodium chloride 0.9 % flush 10 mL (has no administration in time range)   sodium chloride 0.9 % flush 10 mL (has no administration in time range)   sodium chloride 0.9 % bolus 500 mL (0 mL Intravenous Stopped 6/2/23 1623)        ED Course:    The patient was initially evaluated in the triage area where orders were placed. The patient was later dispositioned by Carson Griffith MD.      The patient was advised to stay for completion of workup which includes but is not limited to communication of labs and radiological results, reassessment and plan. The patient was advised that leaving prior to disposition by a provider could result in critical findings that are not communicated to the patient.     ED Course as of 06/02/23 1724   Fri Jun 02, 2023   1413 The patient was seen and examined by me, NAHOMY Centeno, while in triage. Orders placed. Patient is awaiting disposition.   [AR]      ED Course User Index  [AR] Kadie Gomez APRN       Labs:    Lab Results (last 24 hours)      Procedure Component Value Units Date/Time    CBC & Differential [[id number]]  (Normal) Collected: 06/02/23 1349    Specimen: Blood Updated: 06/02/23 1408    Narrative:      The following orders were created for panel order CBC & Differential.  Procedure                               Abnormality         Status                     ---------                               -----------         ------                     CBC Auto Differential[[id number]]        Normal              Final result                 Please view results for these tests on the individual orders.    Comprehensive Metabolic Panel [[id number]]  (Abnormal) Collected: 06/02/23 1349    Specimen: Blood Updated: 06/02/23 1458     Glucose 106 mg/dL      BUN 10 mg/dL      Creatinine 0.83 mg/dL      Sodium 138 mmol/L      Potassium 4.2 mmol/L      Chloride 104 mmol/L      CO2 23.8 mmol/L      Calcium 9.4 mg/dL      Total Protein 6.9 g/dL      Albumin 4.1 g/dL      ALT (SGPT) 21 U/L      AST (SGOT) 14 U/L      Alkaline Phosphatase 50 U/L      Total Bilirubin 0.4 mg/dL      Globulin 2.8 gm/dL      A/G Ratio 1.5 g/dL      BUN/Creatinine Ratio 12.0     Anion Gap 10.2 mmol/L      eGFR 77.9 mL/min/1.73     Narrative:      GFR Normal >60  Chronic Kidney Disease <60  Kidney Failure <15      Single High Sensitivity Troponin T [[id number]]  (Normal) Collected: 06/02/23 1349    Specimen: Blood Updated: 06/02/23 1458     HS Troponin T <6 ng/L     Narrative:      High Sensitive Troponin T Reference Range:  <10.0 ng/L- Negative Female for AMI  <15.0 ng/L- Negative Male for AMI  >=10 - Abnormal Female indicating possible myocardial injury.  >=15 - Abnormal Male indicating possible myocardial injury.   Clinicians would have to utilize clinical acumen, EKG, Troponin, and serial changes to determine if it is an Acute Myocardial Infarction or myocardial injury due to an underlying chronic condition.         Magnesium [[id number]]  (Normal) Collected: 06/02/23 1349    Specimen:  Blood Updated: 06/02/23 1458     Magnesium 2.0 mg/dL     CBC Auto Differential [[id number]]  (Normal) Collected: 06/02/23 1349    Specimen: Blood Updated: 06/02/23 1408     WBC 8.25 10*3/mm3      RBC 4.83 10*6/mm3      Hemoglobin 14.4 g/dL      Hematocrit 44.8 %      MCV 92.8 fL      MCH 29.8 pg      MCHC 32.1 g/dL      RDW 12.8 %      RDW-SD 43.4 fl      MPV 10.0 fL      Platelets 367 10*3/mm3      Neutrophil % 57.9 %      Lymphocyte % 29.9 %      Monocyte % 7.0 %      Eosinophil % 4.4 %      Basophil % 0.6 %      Immature Grans % 0.2 %      Neutrophils, Absolute 4.77 10*3/mm3      Lymphocytes, Absolute 2.47 10*3/mm3      Monocytes, Absolute 0.58 10*3/mm3      Eosinophils, Absolute 0.36 10*3/mm3      Basophils, Absolute 0.05 10*3/mm3      Immature Grans, Absolute 0.02 10*3/mm3      nRBC 0.0 /100 WBC            Imaging:    XR Chest 1 View    Result Date: 6/2/2023  PROCEDURE: XR CHEST 1 VW  COMPARISON: [facility], , CHEST PA/AP & LAT 2V, 8/28/2020, 14:33.  INDICATIONS: weakness, dizziness  FINDINGS:   The lungs are well-expanded. The heart and pulmonary vasculature are within normal limits. No pleural effusions are identified. There are no active appearing infiltrates.  Stable postoperative change/scarring in the right upper lobe.  IMPRESSION: No active disease.  DELISA VILLAR MD       Electronically Signed and Approved By: DELISA VILLAR MD on 6/02/2023 at 14:53                 Differential Diagnosis and Discussion:      Dizziness: Based on the patient's history, signs, and symptoms, the diffential diagnosis includes but is not limited to meningitis, stroke, sepsis, subarachnoid hemorrhage, intracranial bleeding, encephalitis, vertigo, electrolyte imbalance, and metabolic disorders.    All labs were reviewed and interpreted by me.    MDM  Number of Diagnoses or Management Options  Dehydration  Orthostatic hypotension  Diagnosis management comments: In summary this is a 67-year-old female who  presents to the emergency department for evaluation of lightheadedness.  She had positive orthostatic vital signs.  She received IV fluids with improvement of her symptoms and feels back to her baseline at this time.  CBC independently reviewed by me and shows no critical abnormalities.  CMP independently reviewed by me and shows no critical abnormalities.  Very strict return to ER and follow-up instructions have been provided to the patient.             Patient Care Considerations:    CT HEAD: I considered ordering a noncontrast CT of the head, however No focal neurologic deficits      Consultants/Shared Management Plan:    None    Social Determinants of Health:    Patient is independent, reliable, and has access to care.       Disposition and Care Coordination:    Discharged: I considered escalation of care by admitting this patient for observation, however the patient has improved and is suitable and  stable for discharge.    I have explained the patient´s condition, diagnoses and treatment plan based on the information available to me at this time. I have answered questions and addressed any concerns. The patient has a good  understanding of the patient´s diagnosis, condition, and treatment plan as can be expected at this point. The vital signs have been stable. The patient´s condition is stable and appropriate for discharge from the emergency department.      The patient will pursue further outpatient evaluation with the primary care physician or other designated or consulting physician as outlined in the discharge instructions. They are agreeable to this plan of care and follow-up instructions have been explained in detail. The patient has received these instructions in written format and have expressed an understanding of the discharge instructions. The patient is aware that any significant change in condition or worsening of symptoms should prompt an immediate return to this or the closest emergency  department or call to 911.  I have explained discharge medications and the need for follow up with the patient/caretakers. This was also printed in the discharge instructions. Patient was discharged with the following medications and follow up:      Medication List      No changes were made to your prescriptions during this visit.      Renetta Talbert MD  [address] [facility] [address]  [place] [address]  [phone]    In 1 week         Final diagnoses:   Dehydration   Orthostatic hypotension        ED Disposition     ED Disposition   Discharge    Condition   Stable    Comment   --           Documentation assistance provided by Jamie Morales acting as scribe for Carson Griffith MD. Information recorded by the scribe was done at my direction and has been verified and validated by me.       This medical record created using voice recognition software.           Jamie Morales  06/02/23 0025       Carson Griffith MD  06/02/23 2839

## 2023-06-03 LAB — QT INTERVAL: 409 MS

## 2023-06-07 ENCOUNTER — TELEPHONE (OUTPATIENT)
Dept: UROLOGY | Facility: CLINIC | Age: 68
End: 2023-06-07
Payer: MEDICARE

## 2023-06-07 NOTE — TELEPHONE ENCOUNTER
Provider: DR NOVAK  Caller: BROOKS FARIAS  Relationship to Patient: SELF  Pharmacy: [facility]  Phone Number: [phone]  Reason for Call: PT WAS SEEN AT [facility] 6/6/23- PT HAS BEEN DX WITH PROGRESSIVE KIDNEY DISEASE.  PT HAS REQUESTED FOR DR NOVAK TO REVIEW ER NOTES AND LABS.    PLEASE CALL PT TO CONFIRM IF SHE SHOULD BE SEEN BEFORE 7/19 TO DISCUSS.

## 2023-06-08 NOTE — TELEPHONE ENCOUNTER
Her kidney labs actually looked great during her ER visit.  GFR was 77 and normal is above 60.  Her creatinine was 0.83.  I don't see any diagnosis of kidney disease from that visit so not sure why she thinks there is kidney disease.  No need to see her before her appt on 7/19.  Please let her know.  Thanks.

## 2023-06-08 NOTE — TELEPHONE ENCOUNTER
Spoke with patient and informed per Dr. Vu that GFR and creatinine is normal and that patient does not need to come in for an appointment prior to scheduled one on 7/19/23. Patient voiced understanding.

## 2023-07-19 NOTE — H&P (VIEW-ONLY)
[facility]   Urology HISTORY AND PHYSICAL    Patient Name: Neda Núñez  : 1955  MRN: [id number]  Primary Care Physician:  Renetta Talbert MD  Date of admission: (Not on file)    Subjective   Subjective       History of Present Illness  Patient has a small tumor on the trigone of her bladder and presents for cystoscopy and transurethral resection of bladder tumor.      Personal History     Past Medical History:   Diagnosis Date    Allergies     Ear problem     Lung cancer     Reflux esophagitis     Ureteral mass     Urothelial cancer 2020    (Left)    Vocal cord dysfunction     Wax in ear        Past Surgical History:   Procedure Laterality Date     SECTION      CYSTOSCOPY RETROGRADE PYELOGRAM Bilateral 2023    Procedure: BILATERAL CYSTOSCOPY RETROGRADE PYELOGRAM;  Surgeon: Kadie Vu MD;  Location: [facility];  Service: Urology;  Laterality: Bilateral;    CYSTOSCOPY URETERAL BIOPSY      CYSTOSCOPY, URETEROSCOPY, RETROGRADE PYELOGRAM, STENT INSERTION      KIDNEY SURGERY      LEG SURGERY      ignacio and pins     LUNG SURGERY Right     upper lobe removed    NEPHRECTOMY Left     TRANSURETHRAL RESECTION OF BLADDER TUMOR N/A 2021    Procedure: CYSTOSCOPY TRANSURETHRAL RESECTION OF BLADDER TUMOR; CYSTOSCOPY RETROGRADE PYELOGRAM right;  Surgeon: Kadie Vu MD;  Location: [facility];  Service: Urology;  Laterality: N/A;    URETERECTOMY      at the same time Nephrectomy       Family History: family history includes Breast cancer in her paternal grandmother; Cancer in an other family member; Diabetes in her brother and maternal grandfather; Lung cancer in an other family member; Migraines in an other family member. Otherwise pertinent FHx was reviewed and not pertinent to current issue.    Social History:  reports that she has quit smoking. Her smoking use included cigarettes. She smoked an average of 2 packs per day. She has never used smokeless tobacco. She  reports that she does not currently use alcohol. She reports that she does not use drugs.    Home Medications:  Biotin, cholecalciferol, multivitamin with minerals, and zinc sulfate    Allergies:  Allergies   Allergen Reactions    Penicillins Unknown - High Severity     Tongue swelling    Sulfa Antibiotics Unknown - High Severity     Itching/Rash       Objective    Objective     Vitals:        Physical Exam  Constitutional:       Appearance: Normal appearance.   Cardiovascular:      Rate and Rhythm: Normal rate and regular rhythm.   Pulmonary:      Effort: Pulmonary effort is normal.      Breath sounds: Normal breath sounds.   Neurological:      Mental Status: She is alert. Mental status is at baseline.   Psychiatric:         Mood and Affect: Mood and affect normal.         Speech: Speech normal.         Judgment: Judgment normal.       Result Review    Result Review:  I have personally reviewed the results from the time of this admission to 7/19/2023 14:12 EDT and agree with these findings:  [x]  Laboratory  []  Microbiology  [x]  Radiology  []  EKG/Telemetry   []  Cardiology/Vascular   [x]  Pathology  [x]  Old records  []  Other:      Assessment & Plan   Assessment / Plan       Active Hospital Problems:  There are no active hospital problems to display for this patient.      Plan: cystoscopy and transurethral resection of bladder tumor  Risks and benefits discussed with patient and they are agreeable to proceed.    DVT prophylaxis:  No DVT prophylaxis order currently exists.    CODE STATUS:           Electronically signed by Kadie Vu MD, 07/19/23, 2:12 PM EDT.

## 2023-07-28 RX ORDER — CHOLECALCIFEROL (VITAMIN D3) 125 MCG
500 CAPSULE ORAL DAILY
COMMUNITY

## 2023-07-31 ENCOUNTER — TRANSCRIBE ORDERS (OUTPATIENT)
Dept: ADMINISTRATIVE | Facility: HOSPITAL | Age: 68
End: 2023-07-31
Payer: MEDICARE

## 2023-07-31 DIAGNOSIS — M54.42 ACUTE LOW BACK PAIN WITH BILATERAL SCIATICA, UNSPECIFIED BACK PAIN LATERALITY: ICD-10-CM

## 2023-07-31 DIAGNOSIS — N60.19 FIBROCYSTIC BREAST DISEASE (FCBD), UNSPECIFIED LATERALITY: Primary | ICD-10-CM

## 2023-07-31 DIAGNOSIS — M54.41 ACUTE LOW BACK PAIN WITH BILATERAL SCIATICA, UNSPECIFIED BACK PAIN LATERALITY: ICD-10-CM

## 2023-08-01 ENCOUNTER — ANESTHESIA (OUTPATIENT)
Dept: PERIOP | Facility: HOSPITAL | Age: 68
End: 2023-08-01
Payer: MEDICARE

## 2023-08-01 ENCOUNTER — ANESTHESIA EVENT (OUTPATIENT)
Dept: PERIOP | Facility: HOSPITAL | Age: 68
End: 2023-08-01
Payer: MEDICARE

## 2023-08-01 ENCOUNTER — HOSPITAL ENCOUNTER (OUTPATIENT)
Facility: HOSPITAL | Age: 68
Setting detail: HOSPITAL OUTPATIENT SURGERY
Discharge: HOME | End: 2023-08-01
Attending: UROLOGY | Admitting: UROLOGY
Payer: MEDICARE

## 2023-08-01 VITALS
RESPIRATION RATE: 16 BRPM | HEART RATE: 62 BPM | SYSTOLIC BLOOD PRESSURE: 132 MMHG | TEMPERATURE: 96.5 F | HEIGHT: 62 IN | BODY MASS INDEX: 25.44 KG/M2 | OXYGEN SATURATION: 99 % | WEIGHT: 138.23 LBS | DIASTOLIC BLOOD PRESSURE: 75 MMHG

## 2023-08-01 DIAGNOSIS — C68.9 UROTHELIAL CANCER: ICD-10-CM

## 2023-08-01 PROCEDURE — 25010000002 FENTANYL CITRATE (PF) 50 MCG/ML SOLUTION: Performed by: NURSE ANESTHETIST, CERTIFIED REGISTERED

## 2023-08-01 PROCEDURE — 25010000002 LEVOFLOXACIN PER 250 MG: Performed by: UROLOGY

## 2023-08-01 PROCEDURE — 52235 CYSTOSCOPY AND TREATMENT: CPT | Performed by: UROLOGY

## 2023-08-01 PROCEDURE — 25010000002 ONDANSETRON PER 1 MG: Performed by: NURSE ANESTHETIST, CERTIFIED REGISTERED

## 2023-08-01 PROCEDURE — 25010000002 MIDAZOLAM PER 1MG: Performed by: ANESTHESIOLOGY

## 2023-08-01 PROCEDURE — 25010000002 DEXAMETHASONE PER 1 MG: Performed by: NURSE ANESTHETIST, CERTIFIED REGISTERED

## 2023-08-01 PROCEDURE — 88307 TISSUE EXAM BY PATHOLOGIST: CPT | Performed by: UROLOGY

## 2023-08-01 PROCEDURE — 25010000002 SUGAMMADEX 200 MG/2ML SOLUTION: Performed by: NURSE ANESTHETIST, CERTIFIED REGISTERED

## 2023-08-01 PROCEDURE — 25010000002 PROPOFOL 10 MG/ML EMULSION: Performed by: NURSE ANESTHETIST, CERTIFIED REGISTERED

## 2023-08-01 RX ORDER — SODIUM CHLORIDE 0.9 % (FLUSH) 0.9 %
10 SYRINGE (ML) INJECTION AS NEEDED
Status: DISCONTINUED | OUTPATIENT
Start: 2023-08-01 | End: 2023-08-01 | Stop reason: HOSPADM

## 2023-08-01 RX ORDER — ACETAMINOPHEN 325 MG/1
650 TABLET ORAL ONCE
Status: DISCONTINUED | OUTPATIENT
Start: 2023-08-01 | End: 2023-08-01 | Stop reason: HOSPADM

## 2023-08-01 RX ORDER — FENTANYL CITRATE 50 UG/ML
INJECTION, SOLUTION INTRAMUSCULAR; INTRAVENOUS AS NEEDED
Status: DISCONTINUED | OUTPATIENT
Start: 2023-08-01 | End: 2023-08-01 | Stop reason: SURG

## 2023-08-01 RX ORDER — PHENYLEPHRINE HCL IN 0.9% NACL 1 MG/10 ML
SYRINGE (ML) INTRAVENOUS AS NEEDED
Status: DISCONTINUED | OUTPATIENT
Start: 2023-08-01 | End: 2023-08-01 | Stop reason: SURG

## 2023-08-01 RX ORDER — PROMETHAZINE HYDROCHLORIDE 25 MG/1
25 SUPPOSITORY RECTAL ONCE AS NEEDED
Status: DISCONTINUED | OUTPATIENT
Start: 2023-08-01 | End: 2023-08-01 | Stop reason: HOSPADM

## 2023-08-01 RX ORDER — HYDROMORPHONE HYDROCHLORIDE 2 MG/1
2 TABLET ORAL
Status: DISCONTINUED | OUTPATIENT
Start: 2023-08-01 | End: 2023-08-01 | Stop reason: HOSPADM

## 2023-08-01 RX ORDER — ONDANSETRON 2 MG/ML
4 INJECTION INTRAMUSCULAR; INTRAVENOUS ONCE AS NEEDED
Status: DISCONTINUED | OUTPATIENT
Start: 2023-08-01 | End: 2023-08-01 | Stop reason: HOSPADM

## 2023-08-01 RX ORDER — ONDANSETRON 2 MG/ML
INJECTION INTRAMUSCULAR; INTRAVENOUS AS NEEDED
Status: DISCONTINUED | OUTPATIENT
Start: 2023-08-01 | End: 2023-08-01 | Stop reason: SURG

## 2023-08-01 RX ORDER — LIDOCAINE HYDROCHLORIDE 20 MG/ML
INJECTION, SOLUTION EPIDURAL; INFILTRATION; INTRACAUDAL; PERINEURAL AS NEEDED
Status: DISCONTINUED | OUTPATIENT
Start: 2023-08-01 | End: 2023-08-01 | Stop reason: SURG

## 2023-08-01 RX ORDER — SODIUM CHLORIDE 9 MG/ML
40 INJECTION, SOLUTION INTRAVENOUS AS NEEDED
Status: DISCONTINUED | OUTPATIENT
Start: 2023-08-01 | End: 2023-08-01 | Stop reason: HOSPADM

## 2023-08-01 RX ORDER — PROMETHAZINE HYDROCHLORIDE 12.5 MG/1
12.5 TABLET ORAL ONCE AS NEEDED
Status: DISCONTINUED | OUTPATIENT
Start: 2023-08-01 | End: 2023-08-01 | Stop reason: HOSPADM

## 2023-08-01 RX ORDER — SODIUM CHLORIDE 9 MG/ML
100 INJECTION, SOLUTION INTRAVENOUS CONTINUOUS
Status: DISCONTINUED | OUTPATIENT
Start: 2023-08-01 | End: 2023-08-01 | Stop reason: HOSPADM

## 2023-08-01 RX ORDER — PROMETHAZINE HYDROCHLORIDE 12.5 MG/1
25 TABLET ORAL ONCE AS NEEDED
Status: DISCONTINUED | OUTPATIENT
Start: 2023-08-01 | End: 2023-08-01 | Stop reason: HOSPADM

## 2023-08-01 RX ORDER — MAGNESIUM HYDROXIDE 1200 MG/15ML
LIQUID ORAL AS NEEDED
Status: DISCONTINUED | OUTPATIENT
Start: 2023-08-01 | End: 2023-08-01 | Stop reason: HOSPADM

## 2023-08-01 RX ORDER — ROCURONIUM BROMIDE 10 MG/ML
INJECTION, SOLUTION INTRAVENOUS AS NEEDED
Status: DISCONTINUED | OUTPATIENT
Start: 2023-08-01 | End: 2023-08-01 | Stop reason: SURG

## 2023-08-01 RX ORDER — IBUPROFEN 600 MG/1
600 TABLET ORAL EVERY 6 HOURS PRN
Status: DISCONTINUED | OUTPATIENT
Start: 2023-08-01 | End: 2023-08-01 | Stop reason: HOSPADM

## 2023-08-01 RX ORDER — SCOLOPAMINE TRANSDERMAL SYSTEM 1 MG/1
1 PATCH, EXTENDED RELEASE TRANSDERMAL ONCE
Status: DISCONTINUED | OUTPATIENT
Start: 2023-08-01 | End: 2023-08-01 | Stop reason: HOSPADM

## 2023-08-01 RX ORDER — MIDAZOLAM HYDROCHLORIDE 2 MG/2ML
2 INJECTION, SOLUTION INTRAMUSCULAR; INTRAVENOUS ONCE
Status: COMPLETED | OUTPATIENT
Start: 2023-08-01 | End: 2023-08-01

## 2023-08-01 RX ORDER — MEPERIDINE HYDROCHLORIDE 25 MG/ML
12.5 INJECTION INTRAMUSCULAR; INTRAVENOUS; SUBCUTANEOUS
Status: DISCONTINUED | OUTPATIENT
Start: 2023-08-01 | End: 2023-08-01 | Stop reason: HOSPADM

## 2023-08-01 RX ORDER — LEVOFLOXACIN 5 MG/ML
500 INJECTION, SOLUTION INTRAVENOUS ONCE
Status: COMPLETED | OUTPATIENT
Start: 2023-08-01 | End: 2023-08-01

## 2023-08-01 RX ORDER — SODIUM CHLORIDE 0.9 % (FLUSH) 0.9 %
10 SYRINGE (ML) INJECTION EVERY 12 HOURS SCHEDULED
Status: DISCONTINUED | OUTPATIENT
Start: 2023-08-01 | End: 2023-08-01 | Stop reason: HOSPADM

## 2023-08-01 RX ORDER — SODIUM CHLORIDE, SODIUM LACTATE, POTASSIUM CHLORIDE, CALCIUM CHLORIDE 600; 310; 30; 20 MG/100ML; MG/100ML; MG/100ML; MG/100ML
9 INJECTION, SOLUTION INTRAVENOUS CONTINUOUS PRN
Status: DISCONTINUED | OUTPATIENT
Start: 2023-08-01 | End: 2023-08-01 | Stop reason: HOSPADM

## 2023-08-01 RX ORDER — DEXAMETHASONE SODIUM PHOSPHATE 4 MG/ML
INJECTION, SOLUTION INTRA-ARTICULAR; INTRALESIONAL; INTRAMUSCULAR; INTRAVENOUS; SOFT TISSUE AS NEEDED
Status: DISCONTINUED | OUTPATIENT
Start: 2023-08-01 | End: 2023-08-01 | Stop reason: SURG

## 2023-08-01 RX ORDER — PROPOFOL 10 MG/ML
VIAL (ML) INTRAVENOUS AS NEEDED
Status: DISCONTINUED | OUTPATIENT
Start: 2023-08-01 | End: 2023-08-01 | Stop reason: SURG

## 2023-08-01 RX ADMIN — LEVOFLOXACIN 500 MG: 500 INJECTION, SOLUTION INTRAVENOUS at 07:21

## 2023-08-01 RX ADMIN — PROPOFOL 150 MG: 10 INJECTION, EMULSION INTRAVENOUS at 07:18

## 2023-08-01 RX ADMIN — ROCURONIUM BROMIDE 50 MG: 50 INJECTION INTRAVENOUS at 07:18

## 2023-08-01 RX ADMIN — LIDOCAINE HYDROCHLORIDE 70 MG: 20 INJECTION, SOLUTION EPIDURAL; INFILTRATION; INTRACAUDAL; PERINEURAL at 07:18

## 2023-08-01 RX ADMIN — FENTANYL CITRATE 50 MCG: 50 INJECTION, SOLUTION INTRAMUSCULAR; INTRAVENOUS at 07:18

## 2023-08-01 RX ADMIN — MIDAZOLAM HYDROCHLORIDE 2 MG: 1 INJECTION, SOLUTION INTRAMUSCULAR; INTRAVENOUS at 07:09

## 2023-08-01 RX ADMIN — ONDANSETRON 4 MG: 2 INJECTION INTRAMUSCULAR; INTRAVENOUS at 07:37

## 2023-08-01 RX ADMIN — SCOLOPAMINE TRANSDERMAL SYSTEM 1 PATCH: 1 PATCH, EXTENDED RELEASE TRANSDERMAL at 07:09

## 2023-08-01 RX ADMIN — SUGAMMADEX 200 MG: 100 INJECTION, SOLUTION INTRAVENOUS at 07:39

## 2023-08-01 RX ADMIN — Medication 50 MCG: at 07:32

## 2023-08-01 RX ADMIN — SODIUM CHLORIDE, POTASSIUM CHLORIDE, SODIUM LACTATE AND CALCIUM CHLORIDE 9 ML/HR: 600; 310; 30; 20 INJECTION, SOLUTION INTRAVENOUS at 07:08

## 2023-08-01 RX ADMIN — SODIUM CHLORIDE, POTASSIUM CHLORIDE, SODIUM LACTATE AND CALCIUM CHLORIDE: 600; 310; 30; 20 INJECTION, SOLUTION INTRAVENOUS at 07:14

## 2023-08-01 RX ADMIN — DEXAMETHASONE SODIUM PHOSPHATE 4 MG: 4 INJECTION, SOLUTION INTRAMUSCULAR; INTRAVENOUS at 07:37

## 2023-08-01 NOTE — OP NOTE
CYSTOSCOPY TRANSURETHRAL RESECTION OF BLADDER TUMOR  Procedure Report    Patient Name:  Neda Núñez  YOB: 1955    Date of Surgery:  8/1/2023     Pre-op Diagnosis:   Urothelial cancer [C68.9]       Post-Op Diagnosis Codes:     * Urothelial cancer [C68.9]      Procedure/CPTr Codes:    Procedure(s):  CYSTOSCOPY TRANSURETHRAL RESECTION OF BLADDER TUMOR, medium      Staff:  Surgeon(s):  Kadie Vu MD         Anesthesia: Monitored Anesthesia Care    Estimated Blood Loss: none    Implants:    Nothing was implanted during the procedure    Specimen:          Specimens       ID Source Type Tests Collected By Collected At Frozen?    A Urinary Bladder Tissue TISSUE PATHOLOGY EXAM   Kadie Vu MD 8/1/23 0736 No    Description: Bladder tumor                Complications: None    Description of Procedure:     After proper consent was obtained, patient was taken to operating room and general anesthesia was performed.  The patient was placed in the dorsal lithotomy position and prepped and draped in the normal sterile fashion for cystoscopy.      A 22 Sinhala rigid cystoscope was inserted into the bladder.  The bladder was inspected in a systemic meridian fashion using a 30 degree lens.  There was a papillar bladder tumor on the trigone.  The tumor was papillary in nature.  A gyrus resectoscope was inserted into the bladder with a medium loop.  The loop was then used to resect the tumor in its entirety.  There was no perforation of the bladder noted.  The area of resection was then fulgurated to stop any bleeding.  Good muscle was sent with the specimen.  The entire area of resection was approximately 3 cm in size.  Again, no peroration of the bladder was noted.      The pieces of bladder tumor were then removed from the bladder and sent to pathology labeled as bladder tumor.  There was no remaining bleeding.      The patient tolerated the procedure well and was transferred to the PACU in stable  condition.        Kadie uV MD     Date: 8/1/2023  Time: 07:54 EDT

## 2023-08-01 NOTE — DISCHARGE INSTRUCTIONS
DISCHARGE INSTRUCTIONS   TRANSURETHRAL RESECTION   OF BLADDER TUMOR            For your surgery you had:  [x] General anesthesia (you may have a sore throat for the first 24 hours)  -Remove scopolamine patch (patch for nausea behind your ear) as instructed tomorrow evening/night.     You may experience dizziness, drowsiness, or lightheadedness for several hours following surgery.  Do not stay alone today or tonight.  Limit your activity for 24 hours.   You should not drive, operate machinery, drink alcohol, or sign legally binding documents for 24 hours or while you are taking pain medication.   Resume your diet slowly. Follow any special dietary instructions you may have been given by your doctor.         NOTIFY YOUR DOCTOR IF YOU EXPERIENCE ANY OF THE FOLLOWING:  Temperature greater than 101 degrees Fahrenheit  Shaking chills  Nausea, vomiting, and/or pain that is not controlled by prescribed medications  Increase in bleeding or bleeding that is excessive  If unable to reach your doctor, please go to the closest Emergency Room   You will have a catheter to drain your bladder. Catheters are usually removed in 24-48 hours. Wash around the catheter with soap and water and rinse well.   Drink 6 glasses of water a day for 3-4 days.   You may see blood in your urine for up to a week, there may be small blood clots. If so, increase the amount you are drinking.   If you are passing large clots, call your doctor.   Avoid lifting or straining until released by MD.  You may have burning, pain, and frequency of urination for 48 hours after catheter is removed. Call your doctor if it continues longer.   Medications per physician as indicated on discharge medication information sheet.     [x] Last dose of pain medication given at:          SPECIAL INSTRUCTIONS:

## 2023-08-07 ENCOUNTER — HOSPITAL ENCOUNTER (OUTPATIENT)
Dept: MAMMOGRAPHY | Facility: HOSPITAL | Age: 68
Discharge: HOME | End: 2023-08-07
Payer: MEDICARE

## 2023-08-07 ENCOUNTER — HOSPITAL ENCOUNTER (OUTPATIENT)
Dept: ULTRASOUND IMAGING | Facility: HOSPITAL | Age: 68
Discharge: HOME | End: 2023-08-07
Payer: MEDICARE

## 2023-08-07 DIAGNOSIS — N60.19 FIBROCYSTIC BREAST DISEASE (FCBD), UNSPECIFIED LATERALITY: ICD-10-CM

## 2023-08-07 PROCEDURE — 77066 DX MAMMO INCL CAD BI: CPT

## 2023-08-07 PROCEDURE — 76642 ULTRASOUND BREAST LIMITED: CPT

## 2023-08-07 PROCEDURE — G0279 TOMOSYNTHESIS, MAMMO: HCPCS

## 2023-08-09 ENCOUNTER — OFFICE VISIT (OUTPATIENT)
Dept: UROLOGY | Facility: CLINIC | Age: 68
End: 2023-08-09
Payer: MEDICARE

## 2023-08-09 VITALS — WEIGHT: 137.2 LBS | BODY MASS INDEX: 25.25 KG/M2 | HEIGHT: 62 IN

## 2023-08-09 DIAGNOSIS — C68.9 UROTHELIAL CANCER: Primary | ICD-10-CM

## 2023-08-09 LAB
BILIRUB BLD-MCNC: NEGATIVE MG/DL
CLARITY, POC: CLEAR
COLOR UR: YELLOW
EXPIRATION DATE: ABNORMAL
GLUCOSE UR STRIP-MCNC: NEGATIVE MG/DL
KETONES UR QL: NEGATIVE
LEUKOCYTE EST, POC: ABNORMAL
Lab: ABNORMAL
NITRITE UR-MCNC: NEGATIVE MG/ML
PH UR: 5 [PH] (ref 5–8)
PROT UR STRIP-MCNC: NEGATIVE MG/DL
RBC # UR STRIP: ABNORMAL /UL
SP GR UR: 1.02 (ref 1–1.03)
UROBILINOGEN UR QL: ABNORMAL

## 2023-08-09 PROCEDURE — 1159F MED LIST DOCD IN RCRD: CPT | Performed by: UROLOGY

## 2023-08-09 PROCEDURE — 99213 OFFICE O/P EST LOW 20 MIN: CPT | Performed by: UROLOGY

## 2023-08-09 PROCEDURE — 81003 URINALYSIS AUTO W/O SCOPE: CPT | Performed by: UROLOGY

## 2023-08-09 PROCEDURE — 1160F RVW MEDS BY RX/DR IN RCRD: CPT | Performed by: UROLOGY

## 2023-08-09 NOTE — PROGRESS NOTES
"Chief Complaint  Bladder mass (TURBT)    Subjective          Neda Núñez presents to [facility] UROLOGY    History of Present Illness  Ms. Núñez is status post TURBT.  Pathology revealed squamous metaplasia but no cancer.       Urothelial Cancer History  ------------------------------------------  Her original tumor was in her left renal pelvis and she underwent a left nephroureterectomy for high grade urothelial carcinoma in 09/2020. At that time, her stage was a T3. Her cystoscopy in 12/2020 and again in 04/2021 were both normal with no tumor seen. She had cystis cystica in November 2021 but no tumor. Right retrograde pyelogram was done in Nov 2021 and was normal.  TURBT in August 2023 was with squamous metaplasia only.      CT scan in January 2022:   IMPRESSION:  Previous left nephrectomy. No convincing evidence for active malignancy in the abdomen   or pelvis.       Objective   Vital Signs:   Ht 157.5 cm (62\")   Wt 62.2 kg (137 lb 3.2 oz)   BMI 25.09 kg/mý       Physical Exam  Vitals and nursing note reviewed.   Constitutional:       Appearance: Normal appearance. She is well-developed.   Pulmonary:      Effort: Pulmonary effort is normal.      Breath sounds: Normal air entry.   Neurological:      Mental Status: She is alert and oriented to person, place, and time.      Motor: Motor function is intact.   Psychiatric:         Mood and Affect: Mood normal.         Behavior: Behavior normal.        Result Review :   The following data was reviewed by: Kadie Vu MD on 08/09/2023:    Results for orders placed or performed in visit on 08/09/23   POC Urinalysis Dipstick, Automated    Specimen: Urine   Result Value Ref Range    Color Yellow Yellow, Straw, Dark Yellow, Kathrine    Clarity, UA Clear Clear    Specific Gravity  1.020 1.005 - 1.030    pH, Urine 5.0 5.0 - 8.0    Leukocytes Trace (A) Negative    Nitrite, UA Negative Negative    Protein, POC Negative Negative mg/dL    Glucose, UA " Negative Negative mg/dL    Ketones, UA Negative Negative    Urobilinogen, UA 0.2 E.U./dL Normal, 0.2 E.U./dL    Bilirubin Negative Negative    Blood, UA Small (A) Negative    Lot Number [id number]     Expiration Date 72,024             Assessment and Plan    Diagnoses and all orders for this visit:    1. Urothelial cancer (Primary)  Overview:  S/p leftnephroureterectomy in Sept 2020    Orders:  -     POC Urinalysis Dipstick, Automated  -     CT Abdomen Pelvis With & Without Contrast; Future    She will do CT scan and cystoscopy in 6 months.          Follow Up       Return in about 6 months (around 2/9/2024) for Cystoscopy.  Patient was given instructions and counseling regarding her condition or for health maintenance advice. Please see specific information pulled into the AVS if appropriate.

## 2023-08-16 ENCOUNTER — PATIENT OUTREACH (OUTPATIENT)
Dept: ONCOLOGY | Facility: HOSPITAL | Age: 68
End: 2023-08-16
Payer: MEDICARE

## 2023-08-16 ENCOUNTER — HOSPITAL ENCOUNTER (OUTPATIENT)
Dept: MAMMOGRAPHY | Facility: HOSPITAL | Age: 68
Discharge: HOME | End: 2023-08-16
Payer: MEDICARE

## 2023-08-16 ENCOUNTER — APPOINTMENT (OUTPATIENT)
Dept: MAMMOGRAPHY | Facility: HOSPITAL | Age: 68
End: 2023-08-16
Payer: MEDICARE

## 2023-08-16 DIAGNOSIS — N63.0 PALPABLE MASS OF BREAST: ICD-10-CM

## 2023-08-16 PROCEDURE — 76642 ULTRASOUND BREAST LIMITED: CPT

## 2023-08-17 ENCOUNTER — HOSPITAL ENCOUNTER (OUTPATIENT)
Dept: MRI IMAGING | Facility: HOSPITAL | Age: 68
Discharge: HOME | End: 2023-08-17
Admitting: STUDENT IN AN ORGANIZED HEALTH CARE EDUCATION/TRAINING PROGRAM
Payer: MEDICARE

## 2023-08-17 DIAGNOSIS — M54.41 ACUTE LOW BACK PAIN WITH BILATERAL SCIATICA, UNSPECIFIED BACK PAIN LATERALITY: ICD-10-CM

## 2023-08-17 DIAGNOSIS — M54.42 ACUTE LOW BACK PAIN WITH BILATERAL SCIATICA, UNSPECIFIED BACK PAIN LATERALITY: ICD-10-CM

## 2023-08-17 PROCEDURE — 72148 MRI LUMBAR SPINE W/O DYE: CPT

## 2023-08-22 ENCOUNTER — TRANSCRIBE ORDERS (OUTPATIENT)
Dept: ADMINISTRATIVE | Facility: HOSPITAL | Age: 68
End: 2023-08-22
Payer: MEDICARE

## 2023-08-22 DIAGNOSIS — N80.9 ENDOMETRIOSIS: Primary | ICD-10-CM

## 2023-09-06 ENCOUNTER — HOSPITAL ENCOUNTER (OUTPATIENT)
Dept: ULTRASOUND IMAGING | Facility: HOSPITAL | Age: 68
Discharge: HOME | End: 2023-09-06
Admitting: STUDENT IN AN ORGANIZED HEALTH CARE EDUCATION/TRAINING PROGRAM
Payer: MEDICARE

## 2023-09-06 DIAGNOSIS — N80.9 ENDOMETRIOSIS: ICD-10-CM

## 2023-09-06 PROCEDURE — 76830 TRANSVAGINAL US NON-OB: CPT

## 2023-09-08 ENCOUNTER — TRANSCRIBE ORDERS (OUTPATIENT)
Dept: ADMINISTRATIVE | Facility: HOSPITAL | Age: 68
End: 2023-09-08
Payer: MEDICARE

## 2023-09-08 DIAGNOSIS — R93.89 THICKENED ENDOMETRIUM: Primary | ICD-10-CM

## 2023-09-20 ENCOUNTER — HOSPITAL ENCOUNTER (OUTPATIENT)
Dept: PET IMAGING | Facility: HOSPITAL | Age: 68
Discharge: HOME | End: 2023-09-20
Payer: MEDICARE

## 2023-09-20 DIAGNOSIS — R93.89 THICKENED ENDOMETRIUM: ICD-10-CM

## 2023-09-20 PROCEDURE — A9552 F18 FDG: HCPCS | Performed by: STUDENT IN AN ORGANIZED HEALTH CARE EDUCATION/TRAINING PROGRAM

## 2023-09-20 PROCEDURE — 78815 PET IMAGE W/CT SKULL-THIGH: CPT

## 2023-09-20 PROCEDURE — 0 FLUDEOXYGLUCOSE F18 SOLUTION: Performed by: STUDENT IN AN ORGANIZED HEALTH CARE EDUCATION/TRAINING PROGRAM

## 2023-09-20 RX ADMIN — FLUDEOXYGLUCOSE F18 1 DOSE: 300 INJECTION INTRAVENOUS at 13:02

## 2023-10-04 ENCOUNTER — TRANSCRIBE ORDERS (OUTPATIENT)
Dept: ADMINISTRATIVE | Facility: HOSPITAL | Age: 68
End: 2023-10-04
Payer: MEDICARE

## 2023-10-04 DIAGNOSIS — R94.8: Primary | ICD-10-CM

## 2023-10-05 ENCOUNTER — HOSPITAL ENCOUNTER (OUTPATIENT)
Dept: MRI IMAGING | Facility: HOSPITAL | Age: 68
Discharge: HOME | End: 2023-10-05
Admitting: STUDENT IN AN ORGANIZED HEALTH CARE EDUCATION/TRAINING PROGRAM
Payer: MEDICARE

## 2023-10-05 DIAGNOSIS — R94.8: ICD-10-CM

## 2023-10-05 LAB
CREAT BLDA-MCNC: 0.9 MG/DL
EGFRCR SERPLBLD CKD-EPI 2021: 70.2 ML/MIN/1.73

## 2023-10-05 PROCEDURE — 72157 MRI CHEST SPINE W/O & W/DYE: CPT

## 2023-10-05 PROCEDURE — 82565 ASSAY OF CREATININE: CPT

## 2023-10-05 PROCEDURE — A9577 INJ MULTIHANCE: HCPCS | Performed by: STUDENT IN AN ORGANIZED HEALTH CARE EDUCATION/TRAINING PROGRAM

## 2023-10-05 PROCEDURE — 0 GADOBENATE DIMEGLUMINE 529 MG/ML SOLUTION: Performed by: STUDENT IN AN ORGANIZED HEALTH CARE EDUCATION/TRAINING PROGRAM

## 2023-10-05 RX ADMIN — GADOBENATE DIMEGLUMINE 10 ML: 529 INJECTION, SOLUTION INTRAVENOUS at 19:07

## 2023-10-09 ENCOUNTER — TRANSCRIBE ORDERS (OUTPATIENT)
Dept: ADMINISTRATIVE | Facility: HOSPITAL | Age: 68
End: 2023-10-09
Payer: MEDICARE

## 2023-10-09 DIAGNOSIS — E04.1 THYROID NODULE: Primary | ICD-10-CM

## 2023-10-19 ENCOUNTER — TELEPHONE (OUTPATIENT)
Dept: UROLOGY | Facility: CLINIC | Age: 68
End: 2023-10-19
Payer: MEDICARE

## 2023-10-19 NOTE — TELEPHONE ENCOUNTER
Left message for patient explaining that Dr. Vu looked at the scans and didn't really see anything concerning from a urology standpoint as to why patient needed to be seen and that I was calling to get further information.

## 2023-10-19 NOTE — TELEPHONE ENCOUNTER
----- Message from Kadie Vu MD sent at 10/19/2023  4:08 PM EDT -----  Regarding: FW: Appointment Request  Contact: [phone]  I am not really sure either.  Her PET and CT don't seem to show anything concerning from a urology standpoint.  Maybe call her and see why they wanted her seen?    ----- Message -----  From: Iain Mullins RN  Sent: 10/17/2023  10:21 AM EDT  To: Kadie Vu MD  Subject: FW: Appointment Request                          Will you take a look at these and tell me when to schedule this patient? Unless I'm missing something, I'm not sure why she needs to be seen next week as she's requesting.  ----- Message -----  From: Arnie Thomas RegSched Rep  Sent: 10/17/2023  10:11 AM EDT  To: Iain Mullins RN  Subject: FW: Appointment Request                            ----- Message -----  From: Neda Núñez  Sent: 10/17/2023  10:01 AM EDT  To: [facility]  Subject: Appointment Request                              Appointment Request From: Neda Núñez    With Provider: Kadie Vu [[facility] UROLOGY]    Preferred Date Range: 10/25/2023 – 10/27/2023    Preferred Times: Any Time    Reason for visit: Follow-up    Comments:  My HCP is recommending I see you after getting PET scan and MRI with and without contrast.

## 2023-10-23 ENCOUNTER — TRANSCRIBE ORDERS (OUTPATIENT)
Dept: ADMINISTRATIVE | Facility: HOSPITAL | Age: 68
End: 2023-10-23
Payer: MEDICARE

## 2023-10-23 DIAGNOSIS — N60.02 BREAST CYST, LEFT: Primary | ICD-10-CM

## 2023-11-08 ENCOUNTER — TRANSCRIBE ORDERS (OUTPATIENT)
Dept: ADMINISTRATIVE | Facility: HOSPITAL | Age: 68
End: 2023-11-08
Payer: MEDICARE

## 2023-11-08 DIAGNOSIS — Z78.0 POST-MENOPAUSAL: Primary | ICD-10-CM

## 2023-11-17 ENCOUNTER — HOSPITAL ENCOUNTER (OUTPATIENT)
Dept: ULTRASOUND IMAGING | Facility: HOSPITAL | Age: 68
Discharge: HOME | End: 2023-11-17
Admitting: STUDENT IN AN ORGANIZED HEALTH CARE EDUCATION/TRAINING PROGRAM
Payer: MEDICARE

## 2023-11-17 DIAGNOSIS — N60.02 BREAST CYST, LEFT: ICD-10-CM

## 2023-11-17 PROCEDURE — 76642 ULTRASOUND BREAST LIMITED: CPT

## 2023-11-19 ENCOUNTER — TRANSCRIBE ORDERS (OUTPATIENT)
Dept: ADMINISTRATIVE | Facility: HOSPITAL | Age: 68
End: 2023-11-19
Payer: MEDICARE

## 2023-11-19 DIAGNOSIS — N90.89 VULVAR LESION: Primary | ICD-10-CM

## 2023-11-21 ENCOUNTER — LAB (OUTPATIENT)
Dept: LAB | Facility: HOSPITAL | Age: 68
End: 2023-11-21
Payer: MEDICARE

## 2023-11-21 DIAGNOSIS — N90.89 VULVAR LESION: ICD-10-CM

## 2023-11-21 LAB
ABO GROUP BLD: NORMAL
ANION GAP SERPL CALCULATED.3IONS-SCNC: 9.4 MMOL/L (ref 5–15)
APTT PPP: 31.3 SECONDS (ref 24.2–34.2)
BASOPHILS # BLD AUTO: 0.03 10*3/MM3 (ref 0–0.2)
BASOPHILS NFR BLD AUTO: 0.5 % (ref 0–1.5)
BUN SERPL-MCNC: 10 MG/DL (ref 8–23)
BUN/CREAT SERPL: 9.8 (ref 7–25)
CALCIUM SPEC-SCNC: 9.8 MG/DL (ref 8.6–10.5)
CHLORIDE SERPL-SCNC: 104 MMOL/L (ref 98–107)
CO2 SERPL-SCNC: 25.6 MMOL/L (ref 22–29)
CREAT SERPL-MCNC: 1.02 MG/DL (ref 0.57–1)
DEPRECATED RDW RBC AUTO: 41.5 FL (ref 37–54)
EGFRCR SERPLBLD CKD-EPI 2021: 60.4 ML/MIN/1.73
EOSINOPHIL # BLD AUTO: 0.09 10*3/MM3 (ref 0–0.4)
EOSINOPHIL NFR BLD AUTO: 1.4 % (ref 0.3–6.2)
ERYTHROCYTE [DISTWIDTH] IN BLOOD BY AUTOMATED COUNT: 12.5 % (ref 12.3–15.4)
GLUCOSE SERPL-MCNC: 105 MG/DL (ref 65–99)
HCT VFR BLD AUTO: 41.8 % (ref 34–46.6)
HGB BLD-MCNC: 14 G/DL (ref 12–15.9)
IMM GRANULOCYTES # BLD AUTO: 0.01 10*3/MM3 (ref 0–0.05)
IMM GRANULOCYTES NFR BLD AUTO: 0.2 % (ref 0–0.5)
INR PPP: 1.01 (ref 0.86–1.15)
LYMPHOCYTES # BLD AUTO: 1.96 10*3/MM3 (ref 0.7–3.1)
LYMPHOCYTES NFR BLD AUTO: 30.3 % (ref 19.6–45.3)
MCH RBC QN AUTO: 30.2 PG (ref 26.6–33)
MCHC RBC AUTO-ENTMCNC: 33.5 G/DL (ref 31.5–35.7)
MCV RBC AUTO: 90.3 FL (ref 79–97)
MONOCYTES # BLD AUTO: 0.85 10*3/MM3 (ref 0.1–0.9)
MONOCYTES NFR BLD AUTO: 13.2 % (ref 5–12)
NEUTROPHILS NFR BLD AUTO: 3.52 10*3/MM3 (ref 1.7–7)
NEUTROPHILS NFR BLD AUTO: 54.4 % (ref 42.7–76)
NRBC BLD AUTO-RTO: 0 /100 WBC (ref 0–0.2)
PLATELET # BLD AUTO: 326 10*3/MM3 (ref 140–450)
PMV BLD AUTO: 10.8 FL (ref 6–12)
POTASSIUM SERPL-SCNC: 4.2 MMOL/L (ref 3.5–5.2)
PROTHROMBIN TIME: 13.5 SECONDS (ref 11.8–14.9)
RBC # BLD AUTO: 4.63 10*6/MM3 (ref 3.77–5.28)
RH BLD: POSITIVE
SODIUM SERPL-SCNC: 139 MMOL/L (ref 136–145)
WBC NRBC COR # BLD AUTO: 6.46 10*3/MM3 (ref 3.4–10.8)

## 2023-11-21 PROCEDURE — 85610 PROTHROMBIN TIME: CPT

## 2023-11-21 PROCEDURE — 85025 COMPLETE CBC W/AUTO DIFF WBC: CPT

## 2023-11-21 PROCEDURE — 86900 BLOOD TYPING SEROLOGIC ABO: CPT

## 2023-11-21 PROCEDURE — 86901 BLOOD TYPING SEROLOGIC RH(D): CPT

## 2023-11-21 PROCEDURE — 80048 BASIC METABOLIC PNL TOTAL CA: CPT

## 2023-11-21 PROCEDURE — 36415 COLL VENOUS BLD VENIPUNCTURE: CPT

## 2023-11-21 PROCEDURE — 85730 THROMBOPLASTIN TIME PARTIAL: CPT

## 2023-12-01 RX ORDER — FLUTICASONE PROPIONATE 50 MCG
1 SPRAY, SUSPENSION (ML) NASAL DAILY
COMMUNITY
Start: 2023-11-07

## 2023-12-04 ENCOUNTER — OFFICE VISIT (OUTPATIENT)
Dept: SURGERY | Facility: CLINIC | Age: 68
End: 2023-12-04
Payer: MEDICARE

## 2023-12-04 VITALS
RESPIRATION RATE: 18 BRPM | WEIGHT: 138.6 LBS | SYSTOLIC BLOOD PRESSURE: 190 MMHG | BODY MASS INDEX: 25.51 KG/M2 | HEIGHT: 62 IN | DIASTOLIC BLOOD PRESSURE: 113 MMHG

## 2023-12-04 DIAGNOSIS — N60.19 FIBROCYSTIC BREAST DISEASE (FCBD), UNSPECIFIED LATERALITY: Primary | ICD-10-CM

## 2023-12-04 PROCEDURE — 1160F RVW MEDS BY RX/DR IN RCRD: CPT | Performed by: SURGERY

## 2023-12-04 PROCEDURE — 1159F MED LIST DOCD IN RCRD: CPT | Performed by: SURGERY

## 2023-12-04 PROCEDURE — 99203 OFFICE O/P NEW LOW 30 MIN: CPT | Performed by: SURGERY

## 2023-12-04 RX ORDER — ACETAMINOPHEN 500 MG
500 TABLET ORAL
COMMUNITY
Start: 2023-11-29

## 2023-12-04 RX ORDER — OXYCODONE HYDROCHLORIDE 5 MG/1
TABLET ORAL
COMMUNITY
Start: 2023-11-29

## 2023-12-04 RX ORDER — CONJUGATED ESTROGENS 0.62 MG/G
0.25 CREAM VAGINAL DAILY
COMMUNITY
Start: 2023-11-30 | End: 2024-01-28

## 2023-12-04 RX ORDER — NAPROXEN SODIUM 220 MG
220 TABLET ORAL
COMMUNITY
Start: 2023-11-29 | End: 2023-12-14

## 2023-12-04 NOTE — PROGRESS NOTES
Chief Complaint: Abnormal Breast Imaging    Subjective         History of Present Illness  Neda Núñez is a 68 y.o. female presents to [facility] GENERAL SURGERY to be seen for fibrocystic type breast pain.  Her most recent imaging is shown below:       Narrative & Impression   PROCEDURE:  US BREAST LEFT LIMITED     INDICATIONS:  BREAST CYST LEFT     TECHNIQUE:    A targeted breast ultrasound was performed, with evaluation focusing only on specific   areas of concern.     FINDINGS:          Left breast:  Further diminished size of the hypoechoic mass at the 1 o'clock position 5 cm from the nipple a now measuring 2 x 1.2 mm.  No significant change in the 0.9 cm cyst cluster 0200 hours 5 cm from the nipple.  There are no   areas of abnormal shadowing.     IMPRESSION:  Left breast:  0.9 cm cyst cluster 0200 hours 5 cm from the nipple.  Recommend 6   month sonographic follow-up.        RECOMMENDATION(S):               SHORT TERM FOLLOW-UP ULTRASOUND LEFT BREAST IN 6 MONTHS.      She was sent for breast cyst aspiration previously and the cyst had resolved.   She has a family hx of breast cancer.      Objective     Past Medical History:   Diagnosis Date    Allergies     Ear problem     Lung cancer     Reflux esophagitis     Ureteral mass     Urothelial cancer 2020    (Left)    Vocal cord dysfunction     Wax in ear        Past Surgical History:   Procedure Laterality Date     SECTION      CYSTOSCOPY RETROGRADE PYELOGRAM Bilateral 2023    Procedure: BILATERAL CYSTOSCOPY RETROGRADE PYELOGRAM;  Surgeon: Kadie Vu MD;  Location: [facility];  Service: Urology;  Laterality: Bilateral;    CYSTOSCOPY URETERAL BIOPSY      CYSTOSCOPY, URETEROSCOPY, RETROGRADE PYELOGRAM, STENT INSERTION      KIDNEY SURGERY      LEG SURGERY      ignacio and pins     LUNG SURGERY Right     upper lobe removed    NEPHRECTOMY Left     TRANSURETHRAL RESECTION OF BLADDER TUMOR N/A 2021    Procedure:  CYSTOSCOPY TRANSURETHRAL RESECTION OF BLADDER TUMOR; CYSTOSCOPY RETROGRADE PYELOGRAM right;  Surgeon: Kadie Vu MD;  Location: [facility] MAIN OR;  Service: Urology;  Laterality: N/A;    TRANSURETHRAL RESECTION OF BLADDER TUMOR N/A 8/1/2023    Procedure: CYSTOSCOPY TRANSURETHRAL RESECTION OF BLADDER TUMOR;  Surgeon: Kadie Vu MD;  Location: [facility] MAIN OR;  Service: Urology;  Laterality: N/A;    URETERECTOMY      at the same time Nephrectomy         Current Outpatient Medications:     acetaminophen (TYLENOL) 500 MG tablet, Take 1 tablet by mouth., Disp: , Rfl:     BIOTIN PO, Take  by mouth., Disp: , Rfl:     cholecalciferol (VITAMIN D3) 25 MCG (1000 UT) tablet, Take 1 tablet by mouth Daily., Disp: , Rfl:     fluticasone (FLONASE) 50 MCG/ACT nasal spray, 1 spray into the nostril(s) as directed by provider Daily., Disp: , Rfl:     lidocaine (XYLOCAINE) 2 % jelly, Apply  topically to the appropriate area as directed., Disp: , Rfl:     multivitamin with minerals tablet tablet, Take 1 tablet by mouth Daily., Disp: , Rfl:     naproxen sodium (ALEVE) 220 MG tablet, Take 1 tablet by mouth., Disp: , Rfl:     oxyCODONE (ROXICODONE) 5 MG immediate release tablet, , Disp: , Rfl:     Premarin 0.625 MG/GM vaginal cream, Insert 0.5 g into the vagina Daily., Disp: , Rfl:     vitamin B-12 (CYANOCOBALAMIN) 500 MCG tablet, Take 1 tablet by mouth Daily., Disp: , Rfl:     zinc sulfate (ZINCATE) 220 (50 Zn) MG capsule, Take 1 capsule by mouth Daily., Disp: , Rfl:     Allergies   Allergen Reactions    Penicillins Unknown - High Severity, Shortness Of Breath and Swelling     Tongue swelling    Throat swelling    Sulfa Antibiotics Unknown - High Severity and Hives     Itching/Rash        Family History   Problem Relation Age of Onset    Diabetes Brother         Unspecified Type    Diabetes Maternal Grandfather         Unspecified Type    Breast cancer Paternal Grandmother     Cancer Other         Unspecified    Migraines Other  "        Migraine Headaches    Lung cancer Other     Malig Hyperthermia Neg Hx        Social History     Socioeconomic History    Marital status:     Number of children: 2   Tobacco Use    Smoking status: Former     Packs/day: 2     Types: Cigarettes    Smokeless tobacco: Never    Tobacco comments:     Age: [age], quit 12 years ago   Vaping Use    Vaping Use: Never used   Substance and Sexual Activity    Alcohol use: Not Currently     Comment: Light - Rarely as of 7/30/2020    Drug use: Never    Sexual activity: Defer       Vital Signs:   BP (!) 190/113   Resp 18   Ht 157.5 cm (62\")   Wt 62.9 kg (138 lb 9.6 oz)   BMI 25.35 kg/m²    Review of Systems    Physical Exam  Vitals and nursing note reviewed.   Constitutional:       Appearance: Normal appearance.   HENT:      Head: Normocephalic and atraumatic.   Eyes:      Extraocular Movements: Extraocular movements intact.      Pupils: Pupils are equal, round, and reactive to light.   Cardiovascular:      Pulses: Normal pulses.   Pulmonary:      Effort: Pulmonary effort is normal. No accessory muscle usage or respiratory distress.   Chest:   Breasts:     Right: Normal. Tenderness present. No inverted nipple, mass, nipple discharge or skin change.      Left: Normal. Tenderness present. No inverted nipple, mass, nipple discharge or skin change.      Comments: + tenderness bilaterally with dense upper outer breast tissue  Abdominal:      General: Abdomen is flat.      Palpations: Abdomen is soft.      Tenderness: There is no abdominal tenderness. There is no guarding.   Musculoskeletal:         General: No swelling, tenderness or deformity.      Cervical back: Neck supple.   Lymphadenopathy:      Upper Body:      Right upper body: No supraclavicular or axillary adenopathy.      Left upper body: No supraclavicular or axillary adenopathy.   Skin:     General: Skin is warm and dry.   Neurological:      General: No focal deficit present.      Mental Status: She is " alert and oriented to person, place, and time.   Psychiatric:         Mood and Affect: Mood normal.         Thought Content: Thought content normal.          Result Review :               Assessment and Plan    Diagnoses and all orders for this visit:    1. Fibrocystic breast disease (FCBD), unspecified laterality (Primary)  -     US Guided Cyst Aspiration Breast Left; Future  -     Non-gynecologic Cytology; Standing  -     Tissue Pathology Exam; Standing    After cyst aspiration will discuss possible breast mri to better evaluate dense tissue    We discussed that the differential diagnosis of breast pain.There are no concerning findings on her examination today or on her most recent imaging for a focal cause of her pain- ie a mass, inflammation, or trauma. Both her exam and imaging are in good order. The most likely etiology of her breast pain is fibrocystic condition, meaning a hormonal responsiveness of the breast tissue.  We discussed that this pain can be aggravated by many things, including stress, caffeine, and fluctuations in hormone levels.  We discussed the most common interventions to alleviate her symptoms, including wearing a supportive bra, reducing caffeine intake, oral vitamin E 400 units qday or BID, or evening primrose oil 2000-3000mg orally daily. She understood.   I plan to see her back in 2 [facility] to assess her response to the above interventions and asked her to call in the interim with any changes or concerns in her exam.      Follow Up   Return for Followup after imaging study complete.  Patient was given instructions and counseling regarding her condition or for health maintenance advice. Please see specific information pulled into the AVS if appropriate.         This document has been electronically signed by Danyelle Rey MD  December 4, 2023 11:31 EST

## 2023-12-12 ENCOUNTER — HOSPITAL ENCOUNTER (OUTPATIENT)
Dept: BONE DENSITY | Facility: HOSPITAL | Age: 68
Discharge: HOME | End: 2023-12-12
Admitting: STUDENT IN AN ORGANIZED HEALTH CARE EDUCATION/TRAINING PROGRAM
Payer: MEDICARE

## 2023-12-12 PROCEDURE — 77080 DXA BONE DENSITY AXIAL: CPT

## 2023-12-13 ENCOUNTER — PATIENT OUTREACH (OUTPATIENT)
Dept: ONCOLOGY | Facility: HOSPITAL | Age: 68
End: 2023-12-13
Payer: MEDICARE

## 2023-12-13 ENCOUNTER — HOSPITAL ENCOUNTER (OUTPATIENT)
Dept: MAMMOGRAPHY | Facility: HOSPITAL | Age: 68
Discharge: HOME | End: 2023-12-13
Payer: MEDICARE

## 2023-12-13 DIAGNOSIS — N60.19 FIBROCYSTIC BREAST DISEASE (FCBD), UNSPECIFIED LATERALITY: ICD-10-CM

## 2023-12-13 PROCEDURE — 88305 TISSUE EXAM BY PATHOLOGIST: CPT | Performed by: SURGERY

## 2023-12-13 PROCEDURE — 25010000002 LIDOCAINE 1 % SOLUTION: Performed by: SURGERY

## 2023-12-13 PROCEDURE — A4648 IMPLANTABLE TISSUE MARKER: HCPCS

## 2023-12-13 RX ORDER — LIDOCAINE HYDROCHLORIDE AND EPINEPHRINE 10; 10 MG/ML; UG/ML
10 INJECTION, SOLUTION INFILTRATION; PERINEURAL ONCE
Status: COMPLETED | OUTPATIENT
Start: 2023-12-13 | End: 2023-12-13

## 2023-12-13 RX ORDER — LIDOCAINE HYDROCHLORIDE 10 MG/ML
20 INJECTION, SOLUTION INFILTRATION; PERINEURAL ONCE
Status: COMPLETED | OUTPATIENT
Start: 2023-12-13 | End: 2023-12-13

## 2023-12-13 RX ADMIN — LIDOCAINE HYDROCHLORIDE 20 ML: 10 INJECTION, SOLUTION INFILTRATION; PERINEURAL at 11:11

## 2023-12-13 RX ADMIN — LIDOCAINE HYDROCHLORIDE,EPINEPHRINE BITARTRATE 10 ML: 10; .01 INJECTION, SOLUTION INFILTRATION; PERINEURAL at 11:11

## 2023-12-15 ENCOUNTER — TELEPHONE (OUTPATIENT)
Dept: ONCOLOGY | Facility: HOSPITAL | Age: 68
End: 2023-12-15
Payer: MEDICARE

## 2023-12-15 NOTE — TELEPHONE ENCOUNTER
Patient called stating that Dr Frankel used to be her doctor but now she was supposed to be seeing Dr Sharpe and was requesting an appointment to establish care with Dr Sharpe.

## 2023-12-18 ENCOUNTER — OFFICE VISIT (OUTPATIENT)
Dept: SURGERY | Facility: CLINIC | Age: 68
End: 2023-12-18
Payer: MEDICARE

## 2023-12-18 VITALS — RESPIRATION RATE: 17 BRPM | BODY MASS INDEX: 25.4 KG/M2 | WEIGHT: 138 LBS | HEIGHT: 62 IN

## 2023-12-18 DIAGNOSIS — N60.19 FIBROCYSTIC BREAST DISEASE (FCBD), UNSPECIFIED LATERALITY: Primary | ICD-10-CM

## 2023-12-18 DIAGNOSIS — R92.2 DENSE BREASTS: ICD-10-CM

## 2023-12-18 DIAGNOSIS — N64.4 BREAST PAIN: ICD-10-CM

## 2023-12-18 DIAGNOSIS — R92.30 DENSE BREASTS: ICD-10-CM

## 2023-12-18 PROCEDURE — 1160F RVW MEDS BY RX/DR IN RCRD: CPT | Performed by: SURGERY

## 2023-12-18 PROCEDURE — 1159F MED LIST DOCD IN RCRD: CPT | Performed by: SURGERY

## 2023-12-18 PROCEDURE — 99212 OFFICE O/P EST SF 10 MIN: CPT | Performed by: SURGERY

## 2023-12-18 NOTE — PROGRESS NOTES
Chief Complaint: Follow-up    Subjective         History of Present Illness  Neda Núñez is a 68 y.o. female presents to [facility] GENERAL SURGERY to be seen for fibrocystic type breast pain.  Her most recent imaging is shown below:       Narrative & Impression   PROCEDURE:  US BREAST LEFT LIMITED     INDICATIONS:  BREAST CYST LEFT     TECHNIQUE:    A targeted breast ultrasound was performed, with evaluation focusing only on specific   areas of concern.     FINDINGS:          Left breast:  Further diminished size of the hypoechoic mass at the 1 o'clock position 5 cm from the nipple a now measuring 2 x 1.2 mm.  No significant change in the 0.9 cm cyst cluster 0200 hours 5 cm from the nipple.  There are no   areas of abnormal shadowing.     IMPRESSION:  Left breast:  0.9 cm cyst cluster 0200 hours 5 cm from the nipple.  Recommend 6   month sonographic follow-up.        RECOMMENDATION(S):               SHORT TERM FOLLOW-UP ULTRASOUND LEFT BREAST IN 6 MONTHS.      She was sent for breast cyst aspiration previously and the cyst had resolved.   She has a family hx of breast cancer.    Her pathology is shown below:    Clinical Information    Breast cyst   Final Diagnosis   Left breast,  2 o'clock position, 5 cm from nipple, ultrasound-guided core biopsy:               - Apocrine cysts  - Microcalcifications         Objective     Past Medical History:   Diagnosis Date    Allergies     Ear problem     Lung cancer     Reflux esophagitis     Ureteral mass     Urothelial cancer 2020    (Left)    Vocal cord dysfunction     Wax in ear        Past Surgical History:   Procedure Laterality Date     SECTION      CYSTOSCOPY RETROGRADE PYELOGRAM Bilateral 2023    Procedure: BILATERAL CYSTOSCOPY RETROGRADE PYELOGRAM;  Surgeon: Kadie Vu MD;  Location: [facility] MAIN OR;  Service: Urology;  Laterality: Bilateral;    CYSTOSCOPY URETERAL BIOPSY      CYSTOSCOPY, URETEROSCOPY, RETROGRADE  PYELOGRAM, STENT INSERTION      KIDNEY SURGERY      LEG SURGERY      ignacio and pins     LUNG SURGERY Right     upper lobe removed    NEPHRECTOMY Left     TRANSURETHRAL RESECTION OF BLADDER TUMOR N/A 11/02/2021    Procedure: CYSTOSCOPY TRANSURETHRAL RESECTION OF BLADDER TUMOR; CYSTOSCOPY RETROGRADE PYELOGRAM right;  Surgeon: Kadie Vu MD;  Location: [facility] MAIN OR;  Service: Urology;  Laterality: N/A;    TRANSURETHRAL RESECTION OF BLADDER TUMOR N/A 8/1/2023    Procedure: CYSTOSCOPY TRANSURETHRAL RESECTION OF BLADDER TUMOR;  Surgeon: Kadie Vu MD;  Location: [facility] MAIN OR;  Service: Urology;  Laterality: N/A;    URETERECTOMY      at the same time Nephrectomy         Current Outpatient Medications:     acetaminophen (TYLENOL) 500 MG tablet, Take 1 tablet by mouth., Disp: , Rfl:     BIOTIN PO, Take  by mouth., Disp: , Rfl:     cholecalciferol (VITAMIN D3) 25 MCG (1000 UT) tablet, Take 1 tablet by mouth Daily., Disp: , Rfl:     fluticasone (FLONASE) 50 MCG/ACT nasal spray, 1 spray into the nostril(s) as directed by provider Daily., Disp: , Rfl:     lidocaine (XYLOCAINE) 2 % jelly, Apply  topically to the appropriate area as directed., Disp: , Rfl:     multivitamin with minerals tablet tablet, Take 1 tablet by mouth Daily., Disp: , Rfl:     oxyCODONE (ROXICODONE) 5 MG immediate release tablet, , Disp: , Rfl:     Premarin 0.625 MG/GM vaginal cream, Insert 0.5 g into the vagina Daily., Disp: , Rfl:     vitamin B-12 (CYANOCOBALAMIN) 500 MCG tablet, Take 1 tablet by mouth Daily., Disp: , Rfl:     zinc sulfate (ZINCATE) 220 (50 Zn) MG capsule, Take 1 capsule by mouth Daily., Disp: , Rfl:     Allergies   Allergen Reactions    Penicillins Unknown - High Severity, Shortness Of Breath and Swelling     Tongue swelling    Throat swelling    Sulfa Antibiotics Unknown - High Severity and Hives     Itching/Rash        Family History   Problem Relation Age of Onset    Diabetes Brother         Unspecified Type     "Diabetes Maternal Grandfather         Unspecified Type    Breast cancer Paternal Grandmother     Cancer Other         Unspecified    Migraines Other         Migraine Headaches    Lung cancer Other     Malig Hyperthermia Neg Hx        Social History     Socioeconomic History    Marital status:     Number of children: 2   Tobacco Use    Smoking status: Former     Packs/day: 2     Types: Cigarettes    Smokeless tobacco: Never    Tobacco comments:     Age: [age], quit 12 years ago   Vaping Use    Vaping Use: Never used   Substance and Sexual Activity    Alcohol use: Not Currently     Comment: Light - Rarely as of 7/30/2020    Drug use: Never    Sexual activity: Defer       Vital Signs:   Resp 17   Ht 157.5 cm (62\")   Wt 62.6 kg (138 lb)   BMI 25.24 kg/m²    Review of Systems    Physical Exam  Vitals and nursing note reviewed.   Constitutional:       Appearance: Normal appearance.   HENT:      Head: Normocephalic and atraumatic.   Eyes:      Extraocular Movements: Extraocular movements intact.      Pupils: Pupils are equal, round, and reactive to light.   Cardiovascular:      Pulses: Normal pulses.   Pulmonary:      Effort: Pulmonary effort is normal. No accessory muscle usage or respiratory distress.   Chest:   Breasts:     Right: Normal. Tenderness present. No inverted nipple, mass, nipple discharge or skin change.      Left: Normal. Tenderness present. No inverted nipple, mass, nipple discharge or skin change.      Comments: + tenderness bilaterally with dense upper outer breast tissue  Abdominal:      General: Abdomen is flat.      Palpations: Abdomen is soft.      Tenderness: There is no abdominal tenderness. There is no guarding.   Musculoskeletal:         General: No swelling, tenderness or deformity.      Cervical back: Neck supple.   Lymphadenopathy:      Upper Body:      Right upper body: No supraclavicular or axillary adenopathy.      Left upper body: No supraclavicular or axillary adenopathy. "   Skin:     General: Skin is warm and dry.   Neurological:      General: No focal deficit present.      Mental Status: She is alert and oriented to person, place, and time.   Psychiatric:         Mood and Affect: Mood normal.         Thought Content: Thought content normal.          Result Review :               Assessment and Plan    Diagnoses and all orders for this visit:    1. Fibrocystic breast disease (FCBD), unspecified laterality (Primary)    2. Breast pain  -     MRI Breast Bilateral Screening With & Without Contrast; Future    3. Dense breasts  -     MRI Breast Bilateral Screening With & Without Contrast; Future      Will check breast MRI given density of breasts and pain    We discussed that the differential diagnosis of breast pain.There are no concerning findings on her examination today or on her most recent imaging for a focal cause of her pain- ie a mass, inflammation, or trauma. Both her exam and imaging are in good order. The most likely etiology of her breast pain is fibrocystic condition, meaning a hormonal responsiveness of the breast tissue.  We discussed that this pain can be aggravated by many things, including stress, caffeine, and fluctuations in hormone levels.  We discussed the most common interventions to alleviate her symptoms, including wearing a supportive bra, reducing caffeine intake, oral vitamin E 400 units qday or BID, or evening primrose oil 2000-3000mg orally daily. She understood.   I plan to see her back in 2 weels to assess her response to the above interventions and asked her to call in the interim with any changes or concerns in her exam.      Follow Up   Return for Followup after imaging study complete.  Patient was given instructions and counseling regarding her condition or for health maintenance advice. Please see specific information pulled into the AVS if appropriate.         This document has been electronically signed by Danyelle Rey MD  December 18, 2023  11:48 EST

## 2023-12-22 DIAGNOSIS — N64.4 BREAST PAIN: Primary | ICD-10-CM

## 2023-12-22 DIAGNOSIS — R92.2 DENSE BREASTS: ICD-10-CM

## 2023-12-22 DIAGNOSIS — R92.30 DENSE BREASTS: ICD-10-CM

## 2024-01-16 ENCOUNTER — TRANSCRIBE ORDERS (OUTPATIENT)
Dept: ADMINISTRATIVE | Facility: HOSPITAL | Age: 69
End: 2024-01-16
Payer: MEDICARE

## 2024-01-16 DIAGNOSIS — E04.2 MULTINODULAR GOITER: ICD-10-CM

## 2024-01-16 DIAGNOSIS — Z01.818 PRE-OP TESTING: Primary | ICD-10-CM

## 2024-01-17 ENCOUNTER — HOSPITAL ENCOUNTER (OUTPATIENT)
Dept: CARDIOLOGY | Facility: HOSPITAL | Age: 69
Discharge: HOME | End: 2024-01-17
Payer: MEDICARE

## 2024-01-17 DIAGNOSIS — E04.2 MULTINODULAR GOITER: ICD-10-CM

## 2024-01-17 DIAGNOSIS — Z01.818 PRE-OP TESTING: ICD-10-CM

## 2024-01-17 LAB
QT INTERVAL: 402 MS
QTC INTERVAL: 429 MS

## 2024-01-17 PROCEDURE — 93005 ELECTROCARDIOGRAM TRACING: CPT

## 2024-01-18 ENCOUNTER — APPOINTMENT (OUTPATIENT)
Dept: ONCOLOGY | Facility: HOSPITAL | Age: 69
End: 2024-01-18
Payer: MEDICARE

## 2024-01-18 ENCOUNTER — HOSPITAL ENCOUNTER (OUTPATIENT)
Dept: MRI IMAGING | Facility: HOSPITAL | Age: 69
Discharge: HOME | End: 2024-01-18
Admitting: SURGERY
Payer: MEDICARE

## 2024-01-18 DIAGNOSIS — R92.30 DENSE BREASTS: ICD-10-CM

## 2024-01-18 DIAGNOSIS — N64.4 BREAST PAIN: ICD-10-CM

## 2024-01-18 DIAGNOSIS — R92.2 DENSE BREASTS: ICD-10-CM

## 2024-01-18 LAB
CREAT BLDA-MCNC: 0.9 MG/DL (ref 0.6–1.3)
EGFRCR SERPLBLD CKD-EPI 2021: 70.2 ML/MIN/1.73

## 2024-01-18 PROCEDURE — 0 GADOBENATE DIMEGLUMINE 529 MG/ML SOLUTION: Performed by: SURGERY

## 2024-01-18 PROCEDURE — C8908 MRI W/O FOL W/CONT, BREAST,: HCPCS

## 2024-01-18 PROCEDURE — 82565 ASSAY OF CREATININE: CPT

## 2024-01-18 PROCEDURE — C8937 CAD BREAST MRI: HCPCS

## 2024-01-18 PROCEDURE — A9577 INJ MULTIHANCE: HCPCS | Performed by: SURGERY

## 2024-01-18 RX ADMIN — GADOBENATE DIMEGLUMINE 15 ML: 529 INJECTION, SOLUTION INTRAVENOUS at 14:30

## 2024-01-23 LAB
QT INTERVAL: 402 MS
QTC INTERVAL: 429 MS

## 2024-01-25 ENCOUNTER — TRANSCRIBE ORDERS (OUTPATIENT)
Dept: ADMINISTRATIVE | Facility: HOSPITAL | Age: 69
End: 2024-01-25
Payer: MEDICARE

## 2024-01-25 DIAGNOSIS — N90.89 VULVAR LESION: ICD-10-CM

## 2024-01-25 DIAGNOSIS — N90.89 PERINEAL MASS, FEMALE: Primary | ICD-10-CM

## 2024-02-05 ENCOUNTER — HOSPITAL ENCOUNTER (OUTPATIENT)
Dept: CT IMAGING | Facility: HOSPITAL | Age: 69
Discharge: HOME | End: 2024-02-05
Admitting: INTERNAL MEDICINE
Payer: MEDICARE

## 2024-02-05 DIAGNOSIS — C68.9 UROTHELIAL CANCER: ICD-10-CM

## 2024-02-05 PROCEDURE — 74178 CT ABD&PLV WO CNTR FLWD CNTR: CPT

## 2024-02-05 PROCEDURE — 25510000001 IOPAMIDOL PER 1 ML: Performed by: UROLOGY

## 2024-02-05 PROCEDURE — 71260 CT THORAX DX C+: CPT

## 2024-02-05 RX ADMIN — IOPAMIDOL 75 ML: 755 INJECTION, SOLUTION INTRAVENOUS at 11:34

## 2024-02-09 ENCOUNTER — PROCEDURE VISIT (OUTPATIENT)
Dept: UROLOGY | Facility: CLINIC | Age: 69
End: 2024-02-09
Payer: MEDICARE

## 2024-02-09 VITALS — WEIGHT: 138 LBS | HEIGHT: 62 IN | BODY MASS INDEX: 25.4 KG/M2

## 2024-02-09 DIAGNOSIS — C68.9 UROTHELIAL CANCER: Primary | ICD-10-CM

## 2024-02-09 LAB
BILIRUB BLD-MCNC: NEGATIVE MG/DL
CLARITY, POC: CLEAR
COLOR UR: YELLOW
EXPIRATION DATE: ABNORMAL
GLUCOSE UR STRIP-MCNC: NEGATIVE MG/DL
KETONES UR QL: NEGATIVE
LEUKOCYTE EST, POC: NEGATIVE
Lab: ABNORMAL
NITRITE UR-MCNC: NEGATIVE MG/ML
PH UR: 6.5 [PH] (ref 5–8)
PROT UR STRIP-MCNC: NEGATIVE MG/DL
RBC # UR STRIP: NEGATIVE /UL
SP GR UR: 1.01 (ref 1–1.03)
UROBILINOGEN UR QL: ABNORMAL

## 2024-02-09 RX ORDER — PHENOL 1.4 %
600 AEROSOL, SPRAY (ML) MUCOUS MEMBRANE DAILY
COMMUNITY
Start: 2023-12-26

## 2024-02-09 RX ORDER — DOXYCYCLINE HYCLATE 100 MG
1 TABLET ORAL EVERY 12 HOURS SCHEDULED
COMMUNITY
Start: 2024-01-24

## 2024-02-09 RX ORDER — LEVOTHYROXINE SODIUM 0.1 MG/1
100 TABLET ORAL DAILY
COMMUNITY

## 2024-02-09 RX ORDER — OXYCODONE HYDROCHLORIDE AND ACETAMINOPHEN 5; 325 MG/1; MG/1
1 TABLET ORAL EVERY 8 HOURS PRN
COMMUNITY
Start: 2024-01-26 | End: 2024-02-25

## 2024-02-09 NOTE — PROGRESS NOTES
Cystoscopy    Date/Time: 2/9/2024 1:26 PM    Performed by: Kadie Vu MD  Authorized by: Kadie Vu MD  Preparation: Patient was prepped and draped in the usual sterile fashion.  Local anesthesia used: no    Anesthesia:  Local anesthesia used: no    Sedation:  Patient sedated: no    Patient tolerance: patient tolerated the procedure well with no immediate complications  Comments: Cytoscopy Procedure:     Procedure: Flexible cytoscope was passed per urethra into the bladder without difficulty after proper consent. The bladder was inspected in a systematic meridian fashion. There were no tumors, lesions, stones, or other abnormalities noted within the bladder. Of note, there was no increased vascularity as well. The right ureteral orifice was identified and was normal in appearance. The flexible cytoscope was removed. The patient tolerated the procedure well.

## 2024-02-20 ENCOUNTER — HOSPITAL ENCOUNTER (OUTPATIENT)
Dept: PET IMAGING | Facility: HOSPITAL | Age: 69
Discharge: HOME | End: 2024-02-20
Payer: MEDICARE

## 2024-02-20 DIAGNOSIS — N90.89 VULVAR LESION: ICD-10-CM

## 2024-02-20 PROCEDURE — 0 FLUDEOXYGLUCOSE F18 SOLUTION: Performed by: OBSTETRICS & GYNECOLOGY

## 2024-02-20 PROCEDURE — A9552 F18 FDG: HCPCS | Performed by: OBSTETRICS & GYNECOLOGY

## 2024-02-20 PROCEDURE — 78815 PET IMAGE W/CT SKULL-THIGH: CPT

## 2024-02-20 RX ADMIN — FLUDEOXYGLUCOSE F 18 1 DOSE: 200 INJECTION, SOLUTION INTRAVENOUS at 13:04

## 2024-03-01 ENCOUNTER — TRANSCRIBE ORDERS (OUTPATIENT)
Dept: ADMINISTRATIVE | Facility: HOSPITAL | Age: 69
End: 2024-03-01
Payer: MEDICARE

## 2024-03-01 DIAGNOSIS — R59.9 SWELLING OF LYMPH NODES: Primary | ICD-10-CM

## 2024-03-18 NOTE — TELEPHONE ENCOUNTER
Patient said she has blood in her urine.  Has history of bladder cancer.  Had kidney and left ureter removed in September.  Flank pain on and off.  She said she has imaging scheduled 07/09.  What does she need to do?  Can she get urine checked?   Please contact your Cardiac Clinic in regards to your pacemaker and see if they can find out if your pacemaker is MRI-compatible. Call the pain clinic at [phone] once you find out if it is or not. We will then schedule either an MRI or CT for your lumbar spine.

## 2024-03-20 ENCOUNTER — HOSPITAL ENCOUNTER (OUTPATIENT)
Dept: CT IMAGING | Facility: HOSPITAL | Age: 69
Discharge: HOME | End: 2024-03-20
Admitting: STUDENT IN AN ORGANIZED HEALTH CARE EDUCATION/TRAINING PROGRAM
Payer: MEDICARE

## 2024-03-20 DIAGNOSIS — R59.9 SWELLING OF LYMPH NODES: ICD-10-CM

## 2024-03-20 LAB
CREAT BLDA-MCNC: 0.9 MG/DL (ref 0.6–1.3)
EGFRCR SERPLBLD CKD-EPI 2021: 70.2 ML/MIN/1.73

## 2024-03-20 PROCEDURE — 70491 CT SOFT TISSUE NECK W/DYE: CPT

## 2024-03-20 PROCEDURE — 25510000001 IOPAMIDOL PER 1 ML: Performed by: STUDENT IN AN ORGANIZED HEALTH CARE EDUCATION/TRAINING PROGRAM

## 2024-03-20 PROCEDURE — 82565 ASSAY OF CREATININE: CPT

## 2024-03-20 RX ADMIN — IOPAMIDOL 100 ML: 755 INJECTION, SOLUTION INTRAVENOUS at 16:53

## 2024-03-21 ENCOUNTER — OFFICE VISIT (OUTPATIENT)
Dept: ONCOLOGY | Facility: HOSPITAL | Age: 69
End: 2024-03-21
Payer: MEDICARE

## 2024-03-21 VITALS
RESPIRATION RATE: 18 BRPM | BODY MASS INDEX: 26.33 KG/M2 | HEIGHT: 62 IN | SYSTOLIC BLOOD PRESSURE: 153 MMHG | OXYGEN SATURATION: 99 % | WEIGHT: 143.08 LBS | DIASTOLIC BLOOD PRESSURE: 84 MMHG | TEMPERATURE: 97.5 F | HEART RATE: 74 BPM

## 2024-03-21 DIAGNOSIS — C73 PAPILLARY THYROID CARCINOMA: ICD-10-CM

## 2024-03-21 DIAGNOSIS — C34.11 MALIGNANT NEOPLASM OF UPPER LOBE OF RIGHT LUNG: Primary | ICD-10-CM

## 2024-03-21 PROCEDURE — G0463 HOSPITAL OUTPT CLINIC VISIT: HCPCS | Performed by: INTERNAL MEDICINE

## 2024-03-21 RX ORDER — LEVOTHYROXINE SODIUM 88 UG/1
88 TABLET ORAL DAILY
COMMUNITY
Start: 2024-03-01

## 2024-03-21 NOTE — PROGRESS NOTES
Chief Complaint/Care Team   urothelial cancer Dr. Frankel PT    Renetta Talbert MD Cothern, Amanda M, MD    History of Present Illness     Diagnosis: H/o Urothelial Carcinoma of Renal Pelvis    RUL Non-small cell Lung cancer    Papillary thyroid cancer, diagnosed 2023    Current Treatment: Active Surveillance        Neda Núñez is a 68 y.o. female who presents to [facility] HEMATOLOGY & ONCOLOGY for follow up regarding H/o Urothelial Carcinoma of Renal Pelvis, RUL Non-small cell Lung cancer and most recently Papillary thyroid cancer, diagnosed 2023.    Urothelial Carcinoma of the Renal Pelvis:  - s/p left nephroureterectomy in Sept 2020  - pathology showed papillary urothelial carcinoma, invasive, high grade, invading into the renal parenchyma, margins negative, no lymph nodes submitted, pT3Nx  -currently under surveillance     RUL Lung Cancer:  - 2014 at an OSH with NSCLC  - treated with surgery and chemotherapy  -currently under surveillance    Papillary thyroid cancer  -s/p total thyroidectomy in 2/2024 by Dr. Pino at [facility]  -[id number]      Pt denies any fever, chills, night sweats, nausea, vomiting, diarrhea, lymphadenopathy, or unintentional weight loss.    Review of Systems   Constitutional:  Negative for appetite change, diaphoresis, fatigue, fever, unexpected weight gain and unexpected weight loss.   HENT:  Negative for hearing loss, mouth sores, sore throat, swollen glands, trouble swallowing and voice change.    Eyes:  Negative for blurred vision.   Respiratory:  Negative for cough, shortness of breath and wheezing.    Cardiovascular:  Negative for chest pain and palpitations.   Gastrointestinal:  Negative for abdominal pain, blood in stool, constipation, diarrhea, nausea and vomiting.   Endocrine: Negative for cold intolerance and heat intolerance.   Genitourinary:  Negative for difficulty urinating, dysuria, frequency, hematuria and urinary incontinence.   Musculoskeletal:   "Negative for arthralgias, back pain and myalgias.   Skin:  Negative for rash, skin lesions and wound.   Neurological:  Negative for dizziness, seizures, weakness, numbness and headache.   Hematological:  Does not bruise/bleed easily.   Psychiatric/Behavioral:  Negative for depressed mood. The patient is not nervous/anxious.    All other systems reviewed and are negative.       Oncology/Hematology History Overview Note   Urothelial Carcinoma of the Renal Pelvis:  - s/p left nephroureterectomy in Sept 2020  - pathology showed papillary urothelial carcinoma, invasive, high grade, invading into the renal parenchyma, margins negative, no lymph nodes submitted, pT3Nx    RUL Lung Cancer:  - 2014 at an OSH with NSCLC  - treated with surgery and chemotherapy     Lung cancer   10/25/2021 Initial Diagnosis    Lung cancer (HCC)         Objective     Vitals:    03/21/24 1031   BP: 153/84   Pulse: 74   Resp: 18   Temp: 97.5 °F (36.4 °C)   TempSrc: Temporal   SpO2: 99%   Weight: 64.9 kg (143 lb 1.3 oz)   Height: 157.5 cm (62.01\")   PainSc: 0-No pain     ECOG score: 0         PHQ-9 Total Score:         Physical Exam  Vitals reviewed. Exam conducted with a chaperone present.   Constitutional:       General: She is not in acute distress.     Appearance: Normal appearance.   HENT:      Head: Normocephalic and atraumatic.   Eyes:      Extraocular Movements: Extraocular movements intact.      Conjunctiva/sclera: Conjunctivae normal.   Pulmonary:      Effort: Pulmonary effort is normal.   Musculoskeletal:      Cervical back: Normal range of motion and neck supple.   Skin:     General: Skin is warm and dry.      Findings: No bruising.   Neurological:      Mental Status: She is oriented to person, place, and time.           Past Medical History     Past Medical History:   Diagnosis Date    Allergies     Ear problem     Lung cancer     Reflux esophagitis     Ureteral mass     Urothelial cancer 12/03/2020    (Left)    Vocal cord dysfunction  "    Wax in ear      Current Outpatient Medications on File Prior to Visit   Medication Sig Dispense Refill    calcium carbonate (OS-KALYAN) 600 MG tablet Take 1 tablet by mouth Daily.      cholecalciferol (VITAMIN D3) 25 MCG (1000 UT) tablet Take 1 tablet by mouth Daily.      doxycycline (VIBRAMYICN) 100 MG tablet Take 1 tablet by mouth Every 12 (Twelve) Hours.      fluticasone (FLONASE) 50 MCG/ACT nasal spray 1 spray into the nostril(s) as directed by provider Daily.      levothyroxine (SYNTHROID, LEVOTHROID) 88 MCG tablet Take 1 tablet by mouth Daily.      lidocaine (XYLOCAINE) 2 % jelly Apply  topically to the appropriate area as directed.      multivitamin with minerals tablet tablet Take 1 tablet by mouth Daily.      levothyroxine (SYNTHROID, LEVOTHROID) 100 MCG tablet Take 1 tablet by mouth Daily. (Patient not taking: Reported on 3/21/2024)       No current facility-administered medications on file prior to visit.      Allergies   Allergen Reactions    Penicillins Unknown - High Severity, Shortness Of Breath and Swelling     Tongue swelling    Throat swelling    Sulfa Antibiotics Unknown - High Severity and Hives     Itching/Rash    Propoxyphene Other (See Comments)     Jittery     Past Surgical History:   Procedure Laterality Date     SECTION      CYSTOSCOPY RETROGRADE PYELOGRAM Bilateral 2023    Procedure: BILATERAL CYSTOSCOPY RETROGRADE PYELOGRAM;  Surgeon: Kadie Vu MD;  Location: [facility] OR;  Service: Urology;  Laterality: Bilateral;    CYSTOSCOPY URETERAL BIOPSY      CYSTOSCOPY, URETEROSCOPY, RETROGRADE PYELOGRAM, STENT INSERTION      KIDNEY SURGERY      LEG SURGERY      ignacio and pins     LUNG SURGERY Right     upper lobe removed    NEPHRECTOMY Left     TRANSURETHRAL RESECTION OF BLADDER TUMOR N/A 2021    Procedure: CYSTOSCOPY TRANSURETHRAL RESECTION OF BLADDER TUMOR; CYSTOSCOPY RETROGRADE PYELOGRAM right;  Surgeon: Kadie Vu MD;  Location[facility] MAIN OR;  Service:  Urology;  Laterality: N/A;    TRANSURETHRAL RESECTION OF BLADDER TUMOR N/A 8/1/2023    Procedure: CYSTOSCOPY TRANSURETHRAL RESECTION OF BLADDER TUMOR;  Surgeon: Kadie Vu MD;  Location: [facility] MAIN OR;  Service: Urology;  Laterality: N/A;    URETERECTOMY      at the same time Nephrectomy     Social History     Socioeconomic History    Marital status:     Number of children: 2   Tobacco Use    Smoking status: Former     Current packs/day: 2.00     Types: Cigarettes     Passive exposure: Past    Smokeless tobacco: Never    Tobacco comments:     Age: [age], quit 12 years ago   Vaping Use    Vaping status: Never Used   Substance and Sexual Activity    Alcohol use: Not Currently     Comment: Light - Rarely as of 7/30/2020    Drug use: Never    Sexual activity: Defer     Family History   Problem Relation Age of Onset    Diabetes Brother         Unspecified Type    Diabetes Maternal Grandfather         Unspecified Type    Breast cancer Paternal Grandmother     Cancer Other         Unspecified    Migraines Other         Migraine Headaches    Lung cancer Other     Malig Hyperthermia Neg Hx        Results     Result Review   The following data was reviewed by: Catrachito Sharpe MD on 03/21/2024:  Lab Results   Component Value Date    HGB 13.6 01/26/2024    HCT 41.4 01/26/2024    MCV 91.6 01/26/2024     01/26/2024    WBC 8.77 01/26/2024    NEUTROABS 4.32 01/26/2024    LYMPHSABS 3.45 01/26/2024    MONOSABS 0.59 01/26/2024    EOSABS 0.34 01/26/2024    BASOSABS 0.05 01/26/2024     Lab Results   Component Value Date    GLUCOSE 105 (H) 11/21/2023    BUN 10 11/21/2023    CREATININE 0.90 03/20/2024     11/21/2023    K 4.2 11/21/2023     11/21/2023    CO2 25.6 11/21/2023    CALCIUM 9.2 02/22/2024    PROTEINTOT 6.9 06/02/2023    ALBUMIN 4.1 06/02/2023    BILITOT 0.4 06/02/2023    ALKPHOS 50 06/02/2023    AST 14 06/02/2023    ALT 21 06/02/2023     Lab Results   Component Value Date    MG 2.0 06/02/2023     FREET4 1.13 02/27/2024    TSH 1.460 10/24/2022           CT Soft Tissue Neck With Contrast    Result Date: 3/21/2024  Impression: 1.  No evidence of cervical lymphadenopathy or metastasis within neck. 2.  No acute process identified within neck.    Electronically Signed By-Karlos Delarosa MD On:3/21/2024 6:14 PM     CT Soft Tissue Neck With Contrast    Result Date: 3/21/2024  Impression: Impression: 1.  No evidence of cervical lymphadenopathy or metastasis within neck. 2.  No acute process identified within neck.    Electronically Signed By-Karlos Delarosa MD On:3/21/2024 6:14 PM       Assessment & Plan     Diagnoses and all orders for this visit:    1. Malignant neoplasm of upper lobe of right lung (Primary)  -     CBC & Differential; Future  -     Comprehensive Metabolic Panel; Future  -     TSH+Free T4; Future    2. Papillary thyroid carcinoma  -     TSH+Free T4; Future        Neda Núñez is a 68 y.o. female who presents to [facility] HEMATOLOGY & ONCOLOGY for follow up regarding H/o Urothelial Carcinoma of Renal Pelvis, RUL Non-small cell Lung cancer and most recently Papillary thyroid cancer, diagnosed 2023.    All are currently under active surveillance.  -Pt on levothyroxine which was prescribed by Dr. Pino at [facility]  -attempting to get clinic records for history from that office to confirm history    Urothelial Carcinoma of the Renal Pelvis:  - s/p left nephroureterectomy in Sept 2020  - pathology showed papillary urothelial carcinoma, invasive, high grade, invading into the renal parenchyma, margins negative, no lymph nodes submitted, pT3Nx  -currently under surveillance with Dr. Horace SNYDER Lung Cancer:  - 2014 at an OSH with NSCLC  - treated with surgery and chemotherapy  -currently under surveillance    Papillary thyroid cancer  -s/p total thyroidectomy in 2/2024 by Dr. Pino at [facility]  -[id number]    -Plan for pt follow up in 4 months with repeat labs.     Please note that portions  of this note were completed with a voice recognition program.    Electronically signed by Catrachito Sharpe MD, 03/22/24, 4:46 PM EDT.      Follow Up     I spent 45 minutes caring for Neda on this date of service. This time includes time spent by me in the following activities:preparing for the visit, reviewing tests, obtaining and/or reviewing a separately obtained history, performing a medically appropriate examination and/or evaluation , counseling and educating the patient/family/caregiver, ordering medications, tests, or procedures, referring and communicating with other health care professionals , documenting information in the medical record, independently interpreting results and communicating that information with the patient/family/caregiver, and care coordination.    This is an acute or chronic illness that poses a threat to life or bodily function. The above treatment plan involves a high risk of complications and/or mortality of patient management.    The patient was seen and examined. Work by the provider also included review and/or ordering of lab tests, review and/or ordering of radiology tests, review and/or ordering of medicine tests, discussion with other physicians or providers, independent review of data, obtaining old records, review/summation of old records, and/or other review.    I have reviewed the family history, social history, and past medical history for this patient. Previous information and data has been reviewed and updated as needed. I have reviewed and verified the chief complaint, history, and other documentation. The patient was interviewed and examined in the clinic and the chart reviewed. The previous observations, recommendations, and conclusions were reviewed including those of other providers.     The plan was discussed with the patient and/or family. The patient was given time to ask questions and these questions were answered. At the conclusion of their visit they had no  additional questions or concerns and all questions were answered to their satisfaction.    Patient was given instructions and counseling regarding her condition or for health maintenance advice. Please see specific information pulled into the AVS if appropriate.

## 2024-03-23 NOTE — ANESTHESIA POSTPROCEDURE EVALUATION
Patient: Neda Núñez    Procedure Summary     Date: 11/02/21 Room / Location: [facility] OR 07 / [facility] MAIN OR    Anesthesia Start: 1042 Anesthesia Stop: 1134    Procedure: CYSTOSCOPY TRANSURETHRAL RESECTION OF BLADDER TUMOR; CYSTOSCOPY RETROGRADE PYELOGRAM right (N/A ) Diagnosis:       Urothelial cancer (HCC)      (Urothelial cancer (HCC) [C68.9])    Surgeons: Kadie Vu MD Provider: Kush Lindo MD    Anesthesia Type: general ASA Status: 3          Anesthesia Type: general    Vitals  Vitals Value Taken Time   /58 11/02/21 1204   Temp 36.3 °C (97.4 °F) 11/02/21 1134   Pulse 73 11/02/21 1205   Resp 16 11/02/21 1151   SpO2 91 % 11/02/21 1205   Vitals shown include unvalidated device data.        Post Anesthesia Care and Evaluation    Patient location during evaluation: bedside  Patient participation: complete - patient participated  Level of consciousness: awake  Pain score: 0  Pain management: adequate  Airway patency: patent  Anesthetic complications: No anesthetic complications  PONV Status: none  Cardiovascular status: acceptable and stable  Respiratory status: acceptable and room air  Hydration status: acceptable    Comments: An Anesthesiologist personally participated in the most demanding procedures (including induction and emergence if applicable) in the anesthesia plan, monitored the course of anesthesia administration at frequent intervals and remained physically present and available for immediate diagnosis and treatment of emergencies.             Ruth BURTON/Pediatrician

## 2024-04-11 ENCOUNTER — HOSPITAL ENCOUNTER (OUTPATIENT)
Dept: OTHER | Facility: HOSPITAL | Age: 69
Discharge: HOME | End: 2024-04-11

## 2024-05-01 ENCOUNTER — TRANSCRIBE ORDERS (OUTPATIENT)
Dept: ADMINISTRATIVE | Facility: HOSPITAL | Age: 69
End: 2024-05-01
Payer: MEDICARE

## 2024-05-01 DIAGNOSIS — N60.02 BREAST CYST, LEFT: Primary | ICD-10-CM

## 2024-06-14 ENCOUNTER — HOSPITAL ENCOUNTER (OUTPATIENT)
Dept: OTHER | Facility: HOSPITAL | Age: 69
Discharge: HOME | End: 2024-06-14

## 2024-06-17 ENCOUNTER — HOSPITAL ENCOUNTER (OUTPATIENT)
Dept: OTHER | Facility: HOSPITAL | Age: 69
Discharge: HOME | End: 2024-06-17

## 2024-06-19 ENCOUNTER — HOSPITAL ENCOUNTER (OUTPATIENT)
Dept: OTHER | Facility: HOSPITAL | Age: 69
Discharge: HOME | End: 2024-06-19

## 2024-06-20 ENCOUNTER — TELEPHONE (OUTPATIENT)
Dept: UROLOGY | Facility: CLINIC | Age: 69
End: 2024-06-20
Payer: MEDICARE

## 2024-06-20 NOTE — TELEPHONE ENCOUNTER
Pietro MOSS, at [facility] called to advise that patient has 4 mm and punctate kidney stones and a stent was placed after hysterectomy surgery. She will need to be seen by Dr. Vu once the patient has been discharged from [facility] sooner then the 8/16/24 already scheduled appointment. Dr. Grant has already made Dr. Camacho aware of this patients stent placement. I spoke with patient informing her that Dr. Vu is out of town until next week and I will advise her of patient's stent and kidney stones. Once Dr. Vu advises on the next step, we will call the patient. She will call back with any issues or concerns.

## 2024-06-25 ENCOUNTER — TELEPHONE (OUTPATIENT)
Dept: UROLOGY | Facility: CLINIC | Age: 69
End: 2024-06-25
Payer: MEDICARE

## 2024-06-25 ENCOUNTER — PREP FOR SURGERY (OUTPATIENT)
Dept: OTHER | Facility: HOSPITAL | Age: 69
End: 2024-06-25
Payer: MEDICARE

## 2024-06-25 DIAGNOSIS — N20.1 RIGHT URETERAL STONE: Primary | ICD-10-CM

## 2024-06-25 RX ORDER — SODIUM CHLORIDE 9 MG/ML
100 INJECTION, SOLUTION INTRAVENOUS CONTINUOUS
OUTPATIENT
Start: 2024-06-25

## 2024-06-25 RX ORDER — SODIUM CHLORIDE 0.9 % (FLUSH) 0.9 %
10 SYRINGE (ML) INJECTION AS NEEDED
OUTPATIENT
Start: 2024-06-25

## 2024-06-25 RX ORDER — SODIUM CHLORIDE 0.9 % (FLUSH) 0.9 %
10 SYRINGE (ML) INJECTION EVERY 12 HOURS SCHEDULED
OUTPATIENT
Start: 2024-06-25

## 2024-06-25 RX ORDER — SODIUM CHLORIDE 9 MG/ML
40 INJECTION, SOLUTION INTRAVENOUS AS NEEDED
OUTPATIENT
Start: 2024-06-25

## 2024-06-25 NOTE — H&P (VIEW-ONLY)
[facility]   Urology HISTORY AND PHYSICAL    Patient Name: Neda Núñez  : 1955  MRN: [id number]  Primary Care Physician:  Renetta Talbert MD  Date of admission: (Not on file)    Subjective   Subjective       History of Present Illness  Patient has right ureteral stone 4mm proximal and right ureteral stent and presents for right ureteroscopy, laser lithotripsy and right ureteral stent placement.       Personal History     Past Medical History:   Diagnosis Date    Allergies     Ear problem     Lung cancer     Reflux esophagitis     Ureteral mass     Urothelial cancer 2020    (Left)    Vocal cord dysfunction     Wax in ear        Past Surgical History:   Procedure Laterality Date     SECTION      CYSTOSCOPY RETROGRADE PYELOGRAM Bilateral 2023    Procedure: BILATERAL CYSTOSCOPY RETROGRADE PYELOGRAM;  Surgeon: Kadie Vu MD;  Location: [facility];  Service: Urology;  Laterality: Bilateral;    CYSTOSCOPY URETERAL BIOPSY      CYSTOSCOPY, URETEROSCOPY, RETROGRADE PYELOGRAM, STENT INSERTION      KIDNEY SURGERY      LEG SURGERY      ignacio and pins     LUNG SURGERY Right     upper lobe removed    NEPHRECTOMY Left     TRANSURETHRAL RESECTION OF BLADDER TUMOR N/A 2021    Procedure: CYSTOSCOPY TRANSURETHRAL RESECTION OF BLADDER TUMOR; CYSTOSCOPY RETROGRADE PYELOGRAM right;  Surgeon: Kadie Vu MD;  Location: [facility] OR;  Service: Urology;  Laterality: N/A;    TRANSURETHRAL RESECTION OF BLADDER TUMOR N/A 2023    Procedure: CYSTOSCOPY TRANSURETHRAL RESECTION OF BLADDER TUMOR;  Surgeon: Kadie Vu MD;  Location: [facility] OR;  Service: Urology;  Laterality: N/A;    URETERECTOMY      at the same time Nephrectomy       Family History: family history includes Breast cancer in her paternal grandmother; Cancer in an other family member; Diabetes in her brother and maternal grandfather; Lung cancer in an other family member; Migraines in an other family member.  Otherwise pertinent FHx was reviewed and not pertinent to current issue.    Social History:  reports that she has quit smoking. Her smoking use included cigarettes. She has been exposed to tobacco smoke. She has never used smokeless tobacco. She reports that she does not currently use alcohol. She reports that she does not use drugs.    Home Medications:  calcium carbonate, cholecalciferol, doxycycline, fluticasone, levothyroxine, lidocaine, and multivitamin with minerals    Allergies:  Allergies   Allergen Reactions    Penicillins Unknown - High Severity, Shortness Of Breath and Swelling     Tongue swelling    Throat swelling    Sulfa Antibiotics Unknown - High Severity and Hives     Itching/Rash    Propoxyphene Other (See Comments)     Jittery       Objective    Objective     Vitals:   BP: ()/()   Arterial Line BP: ()/()     Physical Exam  Constitutional:       Appearance: Normal appearance.   Cardiovascular:      Rate and Rhythm: Normal rate and regular rhythm.   Pulmonary:      Effort: Pulmonary effort is normal.      Breath sounds: Normal breath sounds.   Neurological:      Mental Status: She is alert. Mental status is at baseline.   Psychiatric:         Mood and Affect: Mood and affect normal.         Speech: Speech normal.         Judgment: Judgment normal.         Result Review    Result Review:  I have personally reviewed the results from the time of this admission to 6/25/2024 13:27 EDT and agree with these findings:  [x]  Laboratory  []  Microbiology  [x]  Radiology  []  EKG/Telemetry   []  Cardiology/Vascular   []  Pathology  [x]  Old records  []  Other:      Assessment & Plan   Assessment / Plan       Active Hospital Problems:  There are no active hospital problems to display for this patient.      Plan: right ureteroscopy, laser lithotripsy and right ureteral stent placement  Risks and benefits discussed with patient and they are agreeable to proceed.    VTE Prophylaxis:  No VTE prophylaxis order  currently exists.        CODE STATUS:           Electronically signed by Kadie Vu MD, 06/25/24, 1:27 PM EDT.

## 2024-06-25 NOTE — TELEPHONE ENCOUNTER
Spoke to patient and scheduled surgery for 7/11/24. I went over preop instructions and informed that I would be sending the information in the mail. Patient voiced understanding.

## 2024-07-05 RX ORDER — MONTELUKAST SODIUM 10 MG/1
10 TABLET ORAL NIGHTLY
COMMUNITY

## 2024-07-05 RX ORDER — OXYCODONE HYDROCHLORIDE 5 MG/1
5 TABLET ORAL EVERY 4 HOURS PRN
COMMUNITY
Start: 2024-06-13

## 2024-07-05 NOTE — PRE-PROCEDURE INSTRUCTIONS
IMPORTANT INSTRUCTIONS - PRE-ADMISSION TESTING  DO NOT EAT OR CHEW anything after midnight the night before your procedure.    You may have CLEAR liquids up to __2__ hours prior to ARRIVAL time.   Take the following medications the morning of your procedure with JUST A SIP OF WATER:  LEVOTHYROXINE, OXYCODONE, IF NEEDED FLONASE _______________________________________________________________________________________________________________________________________________________________________________________    DO NOT BRING your medications to the hospital with you, UNLESS something has changed since your PRE-Admission Testing appointment.  Hold all vitamins, supplements, and NSAIDS (Non- steroidal anti-inflammatory meds) for one week prior to surgery (you MAY take Tylenol or Acetaminophen).  If you are diabetic, check your blood sugar the morning of your procedure. If it is less than 70 or if you are feeling symptomatic, call the following number for further instructions: [phone]  Use your inhalers/nebulizers as usual, the morning of your procedure. BRING YOUR INHALERS with you.   Bring your CPAP or BIPAP to hospital, ONLY IF YOU WILL BE SPENDING THE NIGHT.   Make sure you have a ride home and have someone who will stay with you the day of your procedure after you go home.  If you have any questions, please call your Pre-Admission Testing Nurse, PERRY____ at [phone]_____.   Per anesthesia request, do not smoke for 24 hours before your procedure or as instructed by your surgeon.    Clear Liquid Diet        Find out when you need to start a clear liquid diet.   Think of “clear liquids” as anything you could read a newspaper through. This includes things like water, broth, sports drinks, or tea WITHOUT any kind of milk or cream.           Once you are told to start a clear liquid diet, only drink these things until 2 hours before arrival to the hospital or when the hospital says to stop. Total volume  limitation: 8 oz.       Clear liquids you CAN drink:   Water   Clear broth: beef, chicken, vegetable, or bone broth with nothing in it   Gatorade   Lemonade or Alexsander-aid   Soda   Tea, coffee (NO cream or honey)   Jell-O (without fruit)   Popsicles (without fruit or cream)   Italian ices   Juice without pulp: apple, white, grape   You may use salt, pepper, and sugar    Do NOT drink:   Milk or cream   Soy milk, almond milk, coconut milk, or other non-dairy drinks and   creamers   Milkshakes or smoothies   Tomato juice   Orange juice   Grapefruit juice   Cream soups or any other than broth         Clear Liquid Diet:  Do NOT eat any solid food.  Do NOT eat or suck on mints or candy.  Do NOT chew gum.  Do NOT drink thick liquids like milk or juice with pulp in it.  Do NOT add milk, cream, or anything like soy milk or almond milk to coffee or tea.

## 2024-07-11 ENCOUNTER — ANESTHESIA (OUTPATIENT)
Dept: PERIOP | Facility: HOSPITAL | Age: 69
End: 2024-07-11
Payer: MEDICARE

## 2024-07-11 ENCOUNTER — APPOINTMENT (OUTPATIENT)
Dept: GENERAL RADIOLOGY | Facility: HOSPITAL | Age: 69
End: 2024-07-11
Payer: MEDICARE

## 2024-07-11 ENCOUNTER — HOSPITAL ENCOUNTER (OUTPATIENT)
Facility: HOSPITAL | Age: 69
Setting detail: HOSPITAL OUTPATIENT SURGERY
Discharge: HOME | End: 2024-07-11
Attending: UROLOGY | Admitting: UROLOGY
Payer: MEDICARE

## 2024-07-11 ENCOUNTER — ANESTHESIA EVENT (OUTPATIENT)
Dept: PERIOP | Facility: HOSPITAL | Age: 69
End: 2024-07-11
Payer: MEDICARE

## 2024-07-11 VITALS
WEIGHT: 138.45 LBS | HEIGHT: 62 IN | BODY MASS INDEX: 25.48 KG/M2 | OXYGEN SATURATION: 99 % | TEMPERATURE: 98.1 F | RESPIRATION RATE: 16 BRPM | DIASTOLIC BLOOD PRESSURE: 75 MMHG | SYSTOLIC BLOOD PRESSURE: 132 MMHG | HEART RATE: 70 BPM

## 2024-07-11 DIAGNOSIS — N20.1 RIGHT URETERAL STONE: ICD-10-CM

## 2024-07-11 PROCEDURE — 76000 FLUOROSCOPY <1 HR PHYS/QHP: CPT

## 2024-07-11 PROCEDURE — 25010000002 DEXAMETHASONE PER 1 MG: Performed by: NURSE ANESTHETIST, CERTIFIED REGISTERED

## 2024-07-11 PROCEDURE — 25010000002 MIDAZOLAM PER 1MG: Performed by: ANESTHESIOLOGY

## 2024-07-11 PROCEDURE — 25010000002 CEFAZOLIN PER 500 MG: Performed by: UROLOGY

## 2024-07-11 PROCEDURE — C1758 CATHETER, URETERAL: HCPCS | Performed by: UROLOGY

## 2024-07-11 PROCEDURE — 25010000002 FENTANYL CITRATE (PF) 50 MCG/ML SOLUTION: Performed by: NURSE ANESTHETIST, CERTIFIED REGISTERED

## 2024-07-11 PROCEDURE — 52332 CYSTOSCOPY AND TREATMENT: CPT | Performed by: UROLOGY

## 2024-07-11 PROCEDURE — 25010000002 KETOROLAC TROMETHAMINE PER 15 MG: Performed by: NURSE ANESTHETIST, CERTIFIED REGISTERED

## 2024-07-11 PROCEDURE — C2617 STENT, NON-COR, TEM W/O DEL: HCPCS | Performed by: UROLOGY

## 2024-07-11 PROCEDURE — 52351 CYSTOURETERO & OR PYELOSCOPE: CPT | Performed by: UROLOGY

## 2024-07-11 PROCEDURE — 25010000002 ONDANSETRON PER 1 MG: Performed by: NURSE ANESTHETIST, CERTIFIED REGISTERED

## 2024-07-11 PROCEDURE — C1769 GUIDE WIRE: HCPCS | Performed by: UROLOGY

## 2024-07-11 PROCEDURE — C1894 INTRO/SHEATH, NON-LASER: HCPCS

## 2024-07-11 PROCEDURE — 25810000003 LACTATED RINGERS PER 1000 ML: Performed by: ANESTHESIOLOGY

## 2024-07-11 PROCEDURE — 25010000002 PROPOFOL 10 MG/ML EMULSION: Performed by: NURSE ANESTHETIST, CERTIFIED REGISTERED

## 2024-07-11 DEVICE — URETERAL STENT
Type: IMPLANTABLE DEVICE | Site: URETER | Status: FUNCTIONAL
Brand: ASCERTA™

## 2024-07-11 RX ORDER — FENTANYL CITRATE 50 UG/ML
INJECTION, SOLUTION INTRAMUSCULAR; INTRAVENOUS AS NEEDED
Status: DISCONTINUED | OUTPATIENT
Start: 2024-07-11 | End: 2024-07-11 | Stop reason: SURG

## 2024-07-11 RX ORDER — HYDROCODONE BITARTRATE AND ACETAMINOPHEN 5; 325 MG/1; MG/1
1 TABLET ORAL ONCE AS NEEDED
Status: DISCONTINUED | OUTPATIENT
Start: 2024-07-11 | End: 2024-07-11 | Stop reason: HOSPADM

## 2024-07-11 RX ORDER — ACETAMINOPHEN 500 MG
1000 TABLET ORAL ONCE
Status: DISCONTINUED | OUTPATIENT
Start: 2024-07-11 | End: 2024-07-11 | Stop reason: HOSPADM

## 2024-07-11 RX ORDER — SODIUM CHLORIDE, SODIUM LACTATE, POTASSIUM CHLORIDE, CALCIUM CHLORIDE 600; 310; 30; 20 MG/100ML; MG/100ML; MG/100ML; MG/100ML
9 INJECTION, SOLUTION INTRAVENOUS CONTINUOUS PRN
Status: DISCONTINUED | OUTPATIENT
Start: 2024-07-11 | End: 2024-07-11 | Stop reason: HOSPADM

## 2024-07-11 RX ORDER — PROMETHAZINE HYDROCHLORIDE 25 MG/1
25 SUPPOSITORY RECTAL ONCE AS NEEDED
Status: DISCONTINUED | OUTPATIENT
Start: 2024-07-11 | End: 2024-07-11 | Stop reason: HOSPADM

## 2024-07-11 RX ORDER — MAGNESIUM HYDROXIDE 1200 MG/15ML
LIQUID ORAL AS NEEDED
Status: DISCONTINUED | OUTPATIENT
Start: 2024-07-11 | End: 2024-07-11 | Stop reason: HOSPADM

## 2024-07-11 RX ORDER — PROMETHAZINE HYDROCHLORIDE 12.5 MG/1
25 TABLET ORAL ONCE AS NEEDED
Status: DISCONTINUED | OUTPATIENT
Start: 2024-07-11 | End: 2024-07-11 | Stop reason: HOSPADM

## 2024-07-11 RX ORDER — SODIUM CHLORIDE 9 MG/ML
100 INJECTION, SOLUTION INTRAVENOUS CONTINUOUS
Status: DISCONTINUED | OUTPATIENT
Start: 2024-07-11 | End: 2024-07-11 | Stop reason: HOSPADM

## 2024-07-11 RX ORDER — PROPOFOL 10 MG/ML
VIAL (ML) INTRAVENOUS AS NEEDED
Status: DISCONTINUED | OUTPATIENT
Start: 2024-07-11 | End: 2024-07-11 | Stop reason: SURG

## 2024-07-11 RX ORDER — IBUPROFEN 600 MG/1
600 TABLET ORAL EVERY 6 HOURS PRN
Status: DISCONTINUED | OUTPATIENT
Start: 2024-07-11 | End: 2024-07-11 | Stop reason: HOSPADM

## 2024-07-11 RX ORDER — ONDANSETRON 2 MG/ML
INJECTION INTRAMUSCULAR; INTRAVENOUS AS NEEDED
Status: DISCONTINUED | OUTPATIENT
Start: 2024-07-11 | End: 2024-07-11 | Stop reason: SURG

## 2024-07-11 RX ORDER — SODIUM CHLORIDE 0.9 % (FLUSH) 0.9 %
10 SYRINGE (ML) INJECTION AS NEEDED
Status: DISCONTINUED | OUTPATIENT
Start: 2024-07-11 | End: 2024-07-11 | Stop reason: HOSPADM

## 2024-07-11 RX ORDER — DEXAMETHASONE SODIUM PHOSPHATE 4 MG/ML
INJECTION, SOLUTION INTRA-ARTICULAR; INTRALESIONAL; INTRAMUSCULAR; INTRAVENOUS; SOFT TISSUE AS NEEDED
Status: DISCONTINUED | OUTPATIENT
Start: 2024-07-11 | End: 2024-07-11 | Stop reason: SURG

## 2024-07-11 RX ORDER — KETOROLAC TROMETHAMINE 30 MG/ML
INJECTION, SOLUTION INTRAMUSCULAR; INTRAVENOUS AS NEEDED
Status: DISCONTINUED | OUTPATIENT
Start: 2024-07-11 | End: 2024-07-11 | Stop reason: SURG

## 2024-07-11 RX ORDER — PHENYLEPHRINE HCL IN 0.9% NACL 1 MG/10 ML
SYRINGE (ML) INTRAVENOUS AS NEEDED
Status: DISCONTINUED | OUTPATIENT
Start: 2024-07-11 | End: 2024-07-11 | Stop reason: SURG

## 2024-07-11 RX ORDER — SODIUM CHLORIDE 9 MG/ML
40 INJECTION, SOLUTION INTRAVENOUS AS NEEDED
Status: DISCONTINUED | OUTPATIENT
Start: 2024-07-11 | End: 2024-07-11 | Stop reason: HOSPADM

## 2024-07-11 RX ORDER — LIDOCAINE HYDROCHLORIDE 20 MG/ML
INJECTION, SOLUTION EPIDURAL; INFILTRATION; INTRACAUDAL; PERINEURAL AS NEEDED
Status: DISCONTINUED | OUTPATIENT
Start: 2024-07-11 | End: 2024-07-11 | Stop reason: SURG

## 2024-07-11 RX ORDER — PROMETHAZINE HYDROCHLORIDE 12.5 MG/1
12.5 TABLET ORAL ONCE AS NEEDED
Status: DISCONTINUED | OUTPATIENT
Start: 2024-07-11 | End: 2024-07-11 | Stop reason: HOSPADM

## 2024-07-11 RX ORDER — MEPERIDINE HYDROCHLORIDE 25 MG/ML
12.5 INJECTION INTRAMUSCULAR; INTRAVENOUS; SUBCUTANEOUS
Status: DISCONTINUED | OUTPATIENT
Start: 2024-07-11 | End: 2024-07-11 | Stop reason: HOSPADM

## 2024-07-11 RX ORDER — MIDAZOLAM HYDROCHLORIDE 2 MG/2ML
2 INJECTION, SOLUTION INTRAMUSCULAR; INTRAVENOUS ONCE
Status: COMPLETED | OUTPATIENT
Start: 2024-07-11 | End: 2024-07-11

## 2024-07-11 RX ORDER — ACETAMINOPHEN 325 MG/1
650 TABLET ORAL ONCE
Status: DISCONTINUED | OUTPATIENT
Start: 2024-07-11 | End: 2024-07-11

## 2024-07-11 RX ORDER — SODIUM CHLORIDE 0.9 % (FLUSH) 0.9 %
10 SYRINGE (ML) INJECTION EVERY 12 HOURS SCHEDULED
Status: DISCONTINUED | OUTPATIENT
Start: 2024-07-11 | End: 2024-07-11 | Stop reason: HOSPADM

## 2024-07-11 RX ORDER — ONDANSETRON 2 MG/ML
4 INJECTION INTRAMUSCULAR; INTRAVENOUS ONCE AS NEEDED
Status: DISCONTINUED | OUTPATIENT
Start: 2024-07-11 | End: 2024-07-11 | Stop reason: HOSPADM

## 2024-07-11 RX ADMIN — KETOROLAC TROMETHAMINE 15 MG: 30 INJECTION, SOLUTION INTRAMUSCULAR; INTRAVENOUS at 10:14

## 2024-07-11 RX ADMIN — Medication 50 MCG: at 10:11

## 2024-07-11 RX ADMIN — MIDAZOLAM HYDROCHLORIDE 2 MG: 1 INJECTION, SOLUTION INTRAMUSCULAR; INTRAVENOUS at 09:35

## 2024-07-11 RX ADMIN — PROPOFOL 130 MG: 10 INJECTION, EMULSION INTRAVENOUS at 09:55

## 2024-07-11 RX ADMIN — SODIUM CHLORIDE 2 G: 9 INJECTION, SOLUTION INTRAVENOUS at 09:51

## 2024-07-11 RX ADMIN — DEXAMETHASONE SODIUM PHOSPHATE 4 MG: 4 INJECTION, SOLUTION INTRAMUSCULAR; INTRAVENOUS at 10:00

## 2024-07-11 RX ADMIN — Medication 100 MCG: at 10:05

## 2024-07-11 RX ADMIN — LIDOCAINE HYDROCHLORIDE 60 MG: 20 INJECTION, SOLUTION INTRAVENOUS at 09:55

## 2024-07-11 RX ADMIN — ONDANSETRON HYDROCHLORIDE 4 MG: 2 SOLUTION INTRAMUSCULAR; INTRAVENOUS at 10:00

## 2024-07-11 RX ADMIN — SODIUM CHLORIDE, POTASSIUM CHLORIDE, SODIUM LACTATE AND CALCIUM CHLORIDE 9 ML/HR: 600; 310; 30; 20 INJECTION, SOLUTION INTRAVENOUS at 09:02

## 2024-07-11 RX ADMIN — FENTANYL CITRATE 50 MCG: 50 INJECTION, SOLUTION INTRAMUSCULAR; INTRAVENOUS at 10:00

## 2024-07-11 NOTE — DISCHARGE INSTRUCTIONS
DISCHARGE INSTRUCTIONS  CYSTOSCOPY STENT PLACEMENT/EXCHANGE      For your surgery you had:  General anesthesia (you may have a sore throat for the first 24 hours)    You may experience dizziness, drowsiness, or lightheadedness for several hours following surgery.  Do not stay alone today or tonight.  Limit your activity for 24 hours.  You should not drive or operate machinery, drink alcohol, or sign legally binding documents for 24 hours or while you are taking pain medication.  Resume your diet slowly.  Follow any special dietary instructions you may have been given by your doctor.     NOTIFY YOUR DOCTOR IF YOU EXPERIENCE ANY OF THE FOLLOWING:  Temperature greater than 101 degrees Fahrenheit  Shaking Chills  Redness or excessive drainage from incision  Nausea, vomiting and/or pain that is not controlled by prescribed medications  Increase in bleeding or bleeding that is excessive  Unable to urinate in 6 hours after surgery  If unable to reach your doctor, please go to the closest Emergency Room  Strain urine if instructed by physician.  Collect any fragments and take with you on your scheduled appointment. You may pass small blood clots.  Blood in your urine is normal.  It could be light pink to cherry color. Urine will be bloody for several days.  Drink 6-8 glasses of fluid each day to assist with flushing kidneys.  Back pain is common.  It may feel like a dull ache or back spasm. Most of the time pain may decrease a little with medication, but will not go away until stent is removed.  If you have difficulty urinating, try sitting in a bathtub of warm water.    If you have a stent, it must be managed by your urologist.  Do NOT forget.      Last dose of pain medication was given at:   Toradol last at 10:14am. May take ibuprofen next at 4:14pm if able and needed.    OK TO REMOVE STENT BY STRING IN 5 DAYS    SPECIAL INSTRUCTIONS:              URETERAL STENT TEACHING SHEET   Frequently Asked Questions about Ureteral  Stents          What is a ureteral stent?  A ureteral stent is a small, soft tube that is about 10-12 inches long and about as big around as a swizzle stick. It is placed in the ureter, which is the muscular tube that drains urine from the kidney to the bladder.  Each end of the stent is shaped like a pigtail. One end of the tube sits inside the kidney, and the other end sits in the bladder. The purpose of the stent is to hold the ureter open and maintain proper drainage of urine.  It usually is temporary but may be used long term.  This decision will be made by your doctor.  When is the stent used?  A stent is used in a number of situations.  A stent is placed if the doctor is concerned that urine might not drain well through the ureter, due to blockage or as a reaction to surgery.  Stents usually will be placed in a ureter that has been irritated during a procedure that involves removal of a stone.  Stents for this reason are usually left in place for about a week.  These stents ensure that the ureter does not spasm and collapse after the trauma of the procedure.  Does the stent cause any symptoms?  Many patients do feel the stent.  Most commonly there is bladder irritation, typically causing frequent and/or uncomfortable urination and a sensation of pressure over the bladder or in the lower abdomen. Bloody urine is common. Some patients experience pain in the kidney during urination. There can be urinary tract infections associated with the stent so it is very important that you practice good hygiene. Once the stent is removed, all of the symptoms associated with the stent will quite rapidly disappear (oftentimes immediately). While the stent is in place, you may carry on with most normal activities, including work.  Strenuous activity may cause discomfort. Showering is preferred to tub baths while your stent is in place. If you must take a tub bath, do not use bubble baths or oils as they may lead to infection.        When should the stent be removed?  In some cases the stent can be removed just a few days after the procedure, while in other cases your doctor may recommend that it stay in place for longer periods of time.  You must follow-up with your doctor as directed when you have a stent since stents left in place for too long can lead to blockage, stone formation, urinary infections and ultimately, kidney failure if they are not removed. If your stent passes by itself when you urinate, or you inadvertently pull the string and begin to have large amounts of urine leakage, follow the procedure below to remove the stent.  How is the stent removed?  In some instances, your doctor may decide to leave a string on the stent.  The string is attached to the stent and the string comes out of the urethra (the natural hole where urine exits the body).  The doctor may tell you to remove the stent at home on a given day.  Stents with the string may be removed in a matter of seconds by pulling on the string.  When removing the stent, constant, steady force should be applied, to avoid starting and stopping. Something should be placed below the patient to catch any urine that leaks during removal.  You may choose to remove the stent in the shower, just be sure to have someone close at hand in case you become weak or dizzy.  After the stent is removed, it should be placed in a sealed bag and taken with you to your next office visit.  On the rare occasion that the string breaks and the stent doesn't come out, you should call your doctors office. You should urinate within 4-6 hours after your stent is removed. For this reason, your stent should be removed early in the day.  If you are unable to urinate within 6 hours, call your doctor.   Stents without a string can be removed in the office using a cystoscope. Cystoscopy involves placement of a small flexible tube through the urethra (the hole where urine exits the body). The procedure,  which usually only takes a few minutes and causes little discomfort, is performed in the office. Most patients tolerate having the stent removed using only a topical anesthetic instilled into the urethra. Since no IV line is inserted and there is no anesthesia, you do not have to be accompanied by anyone else and you can eat normally before and after the procedure.  Your doctor may choose to have you return to the hospital to have your stent removed. In this case, you will receive anesthesia and must not eat or drink anything after midnight.

## 2024-07-11 NOTE — ANESTHESIA PREPROCEDURE EVALUATION
Anesthesia Evaluation     Patient summary reviewed and Nursing notes reviewed   no history of anesthetic complications:   NPO Solid Status: > 8 hours  NPO Liquid Status: > 2 hours           Airway   Mallampati: II  TM distance: >3 FB  Neck ROM: full  No difficulty expected  Dental      Pulmonary - normal exam    breath sounds clear to auscultation  (+) lung cancer, COPD,  Cardiovascular - normal exam  Exercise tolerance: good (4-7 METS)    Rhythm: regular  Rate: normal    (+) hyperlipidemia      Neuro/Psych- negative ROS  GI/Hepatic/Renal/Endo    (+) GERD, renal disease- stones, thyroid problem     Musculoskeletal (-) negative ROS    Abdominal    Substance History - negative use     OB/GYN negative ob/gyn ROS         Other      history of cancer remission    ROS/Med Hx Other: PAT Nursing Notes unavailable.                     Anesthesia Plan    ASA 3     general     (Patient understands anesthesia not responsible for dental damage.)  intravenous induction     Anesthetic plan, risks, benefits, and alternatives have been provided, discussed and informed consent has been obtained with: patient.    Use of blood products discussed with patient .    Plan discussed with CRNA.        CODE STATUS:

## 2024-07-11 NOTE — ANESTHESIA POSTPROCEDURE EVALUATION
Patient: Neda Núñez    Procedure Summary       Date: 07/11/24 Room / Location: [facility] OR  / [facility] MAIN OR    Anesthesia Start: 0951 Anesthesia Stop: 1020    Procedure: CYSTOSCOPY, RIGHT URETEROSCOPY, STENT EXCAHNGE (Right) Diagnosis:       Right ureteral stone      (Right ureteral stone [N20.1])    Surgeons: Kadie Vu MD Provider: Omer Martini MD    Anesthesia Type: general ASA Status: 3            Anesthesia Type: general    Vitals  Vitals Value Taken Time   BP 99/61 07/11/24 1035   Temp 36.3 °C (97.3 °F) 07/11/24 1020   Pulse 67 07/11/24 1037   Resp 16 07/11/24 1030   SpO2 94 % 07/11/24 1037   Vitals shown include unfiled device data.        Post Anesthesia Care and Evaluation    Patient location during evaluation: bedside  Patient participation: complete - patient participated  Level of consciousness: awake  Pain score: 2  Pain management: adequate    Airway patency: patent  PONV Status: none  Cardiovascular status: acceptable and stable  Respiratory status: acceptable  Hydration status: acceptable

## 2024-07-11 NOTE — OP NOTE
URETEROSCOPY LASER LITHOTRIPSY WITH STENT INSERTION  Procedure Report    Patient Name:  Neda Núñez  YOB: 1955    Date of Surgery:  7/11/2024     Pre-op Diagnosis:   Right ureteral stone [N20.1]       Post-Op Diagnosis Codes:     * Right ureteral stone [N20.1]      Procedure/CPT® Codes:    Procedure(s):  CYSTOSCOPY, RIGHT URETEROSCOPY, STENT EXCAHNGE      Staff:  Surgeon(s):  Kadie Vu MD         Anesthesia: General    Estimated Blood Loss: 0 mL    Implants:    Implant Name Type Inv. Item Serial No.  Lot No. LRB No. Used Action   STNT URETRL ASCERTA 4.8F 24CM - [id number] Stent STNT URETRL ASCERTA 4.8F 24CM  [employer] [id number] Right 1 Implanted       Specimen:          None        Complications: None    Description of Procedure:     After proper consent was obtained, patient was taken to operating room and placed in the dorsal lithotomy position.  The patient was prepped and draped in the normal sterile fashion for a right ureteroscopy.      A 22 [place] rigid cystoscopy was passed per urethra into the bladder.  The bladder was inspected in a systemic meridian fashion.  No stones, tumors or other abnormalities were seen.  Stent coming from the right ureteral orifice.  It was grasped and removed.    A glide wire was passed up the right ureteral orifice without difficulty.  A dual lumen catheter was placed and a second wire was placed as a safety wire.  Over the working wire a ureteral access sheath was passed.  A flexible scope was then passed into the ureter.  The right ureter and renal pelvis was extensively explored.  There were no stones seen within the kidney nor within the ureter.       There was no evidence of injury to the ureter.  The ureteroscope was removed.  Over the wire, a 4.8 [place]uran 24 cm stent was passed.  Under fluoroscopy it was seen curling in the renal pelvis and under cystoscopy was seen curling in the bladder.  The cystoscope was removed.      A  string was left on the stent.      The patient tolerated the procedure well and was transferred to the PACU in stable condition.        Kadie Vu MD     Date: 7/11/2024  Time: 10:38 EDT

## 2024-07-12 ENCOUNTER — TELEPHONE (OUTPATIENT)
Dept: UROLOGY | Facility: CLINIC | Age: 69
End: 2024-07-12
Payer: MEDICARE

## 2024-07-12 NOTE — TELEPHONE ENCOUNTER
PATIENT HAD A CYSTO STENT PLACEMENT YESTERDAY AND STATES THE TAPE GOT CAUGHT ON HER CLOTHES AND PULLED THE STENT PART OF THE WAY OUT. SHE STATES SHE KEPT LEAKING URINE SO SHE CALLED THE HOSPITAL AND THE DOCTOR ON CALL TOLD HER TO PULL THE STENT OUT AND TALKED HER THROUGH PULLING THE STENT OUT. PATIENT STATES SHE IS NOT HAVING ANY PAIN OR BLEEDING. SHE IS URINATING FINE.# [phone].

## 2024-07-17 ENCOUNTER — TELEPHONE (OUTPATIENT)
Dept: UROLOGY | Facility: CLINIC | Age: 69
End: 2024-07-17

## 2024-07-17 DIAGNOSIS — N20.0 KIDNEY STONE: Primary | ICD-10-CM

## 2024-07-17 NOTE — TELEPHONE ENCOUNTER
PATIENT DAUGHTER SHAKIRA LISTED ON  VERBAL CALLED IN TO CX POST OP APPT 7/17 W/ KISHORE    SHAKIRA STATED PT PULLED OUT STENT SAME DAY IT WAS PLACED.     INFORMED HER WE WOULD PUT IN A 1 YEAR RECALL W/ SANDRA PRIOR. DAUGHTER VERBALIZED UNDERSTANDING.

## 2024-07-23 ENCOUNTER — HOSPITAL ENCOUNTER (OUTPATIENT)
Dept: OTHER | Facility: HOSPITAL | Age: 69
Discharge: HOME | End: 2024-07-23

## 2024-07-24 ENCOUNTER — OFFICE VISIT (OUTPATIENT)
Dept: ONCOLOGY | Facility: HOSPITAL | Age: 69
End: 2024-07-24
Payer: MEDICARE

## 2024-07-24 VITALS
BODY MASS INDEX: 24.92 KG/M2 | HEART RATE: 77 BPM | OXYGEN SATURATION: 100 % | TEMPERATURE: 98.9 F | WEIGHT: 135.4 LBS | RESPIRATION RATE: 18 BRPM | DIASTOLIC BLOOD PRESSURE: 92 MMHG | HEIGHT: 62 IN | SYSTOLIC BLOOD PRESSURE: 170 MMHG

## 2024-07-24 DIAGNOSIS — Z12.31 ENCOUNTER FOR SCREENING MAMMOGRAM FOR MALIGNANT NEOPLASM OF BREAST: ICD-10-CM

## 2024-07-24 DIAGNOSIS — C73 PAPILLARY THYROID CARCINOMA: ICD-10-CM

## 2024-07-24 DIAGNOSIS — C56.9 MALIGNANT NEOPLASM OF OVARY, UNSPECIFIED LATERALITY: ICD-10-CM

## 2024-07-24 DIAGNOSIS — C68.9 UROTHELIAL CANCER: ICD-10-CM

## 2024-07-24 DIAGNOSIS — C34.11 MALIGNANT NEOPLASM OF UPPER LOBE OF RIGHT LUNG: Primary | ICD-10-CM

## 2024-07-24 PROCEDURE — G0463 HOSPITAL OUTPT CLINIC VISIT: HCPCS | Performed by: INTERNAL MEDICINE

## 2024-07-24 NOTE — PROGRESS NOTES
Chief Complaint/Care Team   Malignant neoplasm of upper lobe of right lung    Renetta Talbert MD Cothern, Amanda M, MD    History of Present Illness     Diagnosis: H/o Urothelial Carcinoma of Renal Pelvis    RUL Non-small cell Lung cancer    Papillary thyroid cancer, diagnosed 2023    Endometrioid adenocarcinoma, FIGO grade 1 diagnosed 6/12/2024 via [facility], pT1a pN0, FIGO stage IA    Left ovary granulosa cell tumor diagnosed 6/12/2024, pT1a pN0 FIGO stage IA      Current Treatment: Active Surveillance        Neda Núñez is a 69 y.o. female who presents to [facility]EMAT[facility]Y for follow up regarding H/o Urothelial Carcinoma of Renal Pelvis, RUL Non-small cell Lung cancer and most recently Papillary thyroid cancer, diagnosed 2023.    Urothelial Carcinoma of the Renal Pelvis:  - s/p left nephroureterectomy in Sept 2020  - pathology showed papillary urothelial carcinoma, invasive, high grade, invading into the renal parenchyma, margins negative, no lymph nodes submitted, pT3Nx  -currently under surveillance     RUL Lung Cancer:  - 2014 at an OSH with NSCLC  - treated with surgery and chemotherapy  -currently under surveillance    Papillary thyroid cancer  -s/p total thyroidectomy in 2/2024 by Dr. Pino at [email or web address]]  -[id number]    Endometrioid adenocarcinoma, FIGO grade 1 diagnosed 6/12/2024 via [facility], pT1a pN0, FIGO stage IA    Left ovary granulosa cell tumor diagnosed 6/12/2024, pT1a pN0 FIGO stage IA    Pt denies any fever, chills, night sweats, nausea, vomiting, diarrhea, lymphadenopathy, or unintentional weight loss.  Patient does report right areola breast is larger than the left, reports this sensation began after her BSO and complete hysterectomy at [facility] on 6/12/2024.  Patient underwent restaging imaging with CT chest and pelvis on 7/23/2024 and she is here to discuss those results.    Review of Systems   Constitutional:  Negative for  "appetite change, diaphoresis, fatigue, fever, unexpected weight gain and unexpected weight loss.   HENT:  Negative for hearing loss, mouth sores, sore throat, swollen glands, trouble swallowing and voice change.    Eyes:  Negative for blurred vision.   Respiratory:  Negative for cough, shortness of breath and wheezing.    Cardiovascular:  Negative for chest pain and palpitations.   Gastrointestinal:  Negative for abdominal pain, blood in stool, constipation, diarrhea, nausea and vomiting.   Endocrine: Positive for heat intolerance (hot flashes). Negative for cold intolerance.   Genitourinary:  Negative for difficulty urinating, dysuria, frequency, hematuria and urinary incontinence.   Musculoskeletal:  Negative for arthralgias, back pain and myalgias.   Skin:  Negative for rash, skin lesions and wound.   Neurological:  Negative for dizziness, seizures, weakness, numbness and headache.   Hematological:  Does not bruise/bleed easily.   Psychiatric/Behavioral:  Negative for depressed mood. The patient is not nervous/anxious.    All other systems reviewed and are negative.       Oncology/Hematology History Overview Note   Urothelial Carcinoma of the Renal Pelvis:  - s/p left nephroureterectomy in Sept 2020  - pathology showed papillary urothelial carcinoma, invasive, high grade, invading into the renal parenchyma, margins negative, no lymph nodes submitted, pT3Nx    RUL Lung Cancer:  - 2014 at an OSH with NSCLC  - treated with surgery and chemotherapy     Lung cancer   10/25/2021 Initial Diagnosis    Lung cancer (HCC)         Objective     Vitals:    07/24/24 1156   BP: 170/92   Pulse: 77   Resp: 18   Temp: 98.9 °F (37.2 °C)   TempSrc: Temporal   SpO2: 100%   Weight: 61.4 kg (135 lb 6.4 oz)   Height: 157.5 cm (62\")   PainSc: 0-No pain       ECOG score: 0         PHQ-9 Total Score:         Physical Exam  Vitals reviewed. Exam conducted with a chaperone present.   Constitutional:       General: She is not in acute " distress.     Appearance: Normal appearance.   HENT:      Head: Normocephalic and atraumatic.   Eyes:      Extraocular Movements: Extraocular movements intact.      Conjunctiva/sclera: Conjunctivae normal.   Cardiovascular:      Comments: No nipple changes or palpable masses in either breast and no bilateral axillary LAD palpated on exam  Pulmonary:      Effort: Pulmonary effort is normal.   Musculoskeletal:      Cervical back: Normal range of motion and neck supple.   Skin:     General: Skin is warm and dry.      Findings: No bruising.   Neurological:      Mental Status: She is oriented to person, place, and time.           Past Medical History     Past Medical History:   Diagnosis Date    Allergies     Disease of thyroid gland     Ear problem     Emphysema lung     Endometrial cancer     Kidney stone     Lung cancer     Mass of vulva     Reflux esophagitis     Thyroid cancer     Ureteral mass     Urothelial cancer 12/03/2020    (Left)    Vocal cord dysfunction     Wax in ear      Current Outpatient Medications on File Prior to Visit   Medication Sig Dispense Refill    fluticasone (FLONASE) 50 MCG/ACT nasal spray 1 spray into the nostril(s) as directed by provider Daily.      levothyroxine (SYNTHROID, LEVOTHROID) 75 MCG tablet Take 1 tablet by mouth Daily. MONDAY - SATURDAY . TO HOLD ON SUNDAY      montelukast (SINGULAIR) 10 MG tablet Take 1 tablet by mouth Every Night. (Patient not taking: Reported on 7/24/2024)      oxyCODONE (ROXICODONE) 5 MG immediate release tablet Take 1 tablet by mouth Every 4 (Four) Hours As Needed. (Patient not taking: Reported on 7/24/2024)       No current facility-administered medications on file prior to visit.      Allergies   Allergen Reactions    Penicillins Unknown - High Severity, Shortness Of Breath and Swelling     Tongue swelling    Throat swelling    Sulfa Antibiotics Unknown - High Severity and Hives     Itching/Rash    Percocet [Oxycodone-Acetaminophen] Irritability     Past  Surgical History:   Procedure Laterality Date     SECTION      CYSTOSCOPY RETROGRADE PYELOGRAM Bilateral 2023    Procedure: BILATERAL CYSTOSCOPY RETROGRADE PYELOGRAM;  Surgeon: Kadie Vu MD;  Location: [facility] MAIN OR;  Service: Urology;  Laterality: Bilateral;    CYSTOSCOPY URETERAL BIOPSY      CYSTOSCOPY W/ URETERAL STENT PLACEMENT      CYSTOSCOPY, URETEROSCOPY, RETROGRADE PYELOGRAM, STENT INSERTION      HYSTERECTOMY  2024    KIDNEY SURGERY      LEG SURGERY      ignacio and pins     LUNG SURGERY Right     upper lobe removed    NEPHRECTOMY Left     THYROIDECTOMY      TRANSURETHRAL RESECTION OF BLADDER TUMOR N/A 2021    Procedure: CYSTOSCOPY TRANSURETHRAL RESECTION OF BLADDER TUMOR; CYSTOSCOPY RETROGRADE PYELOGRAM right;  Surgeon: Kadie Vu MD;  Location: [facility] OR;  Service: Urology;  Laterality: N/A;    TRANSURETHRAL RESECTION OF BLADDER TUMOR N/A 2023    Procedure: CYSTOSCOPY TRANSURETHRAL RESECTION OF BLADDER TUMOR;  Surgeon: Kadie Vu MD;  Location: [facility] OR;  Service: Urology;  Laterality: N/A;    URETERECTOMY      at the same time Nephrectomy    URETEROSCOPY LASER LITHOTRIPSY WITH STENT INSERTION Right 2024    Procedure: CYSTOSCOPY, RIGHT URETEROSCOPY, STENT EXCAHNGE;  Surgeon: Kadie Vu MD;  Location: [facility] OR;  Service: Urology;  Laterality: Right;     Social History     Socioeconomic History    Marital status:     Number of children: 2   Tobacco Use    Smoking status: Former     Current packs/day: 2.00     Types: Cigarettes     Passive exposure: Past    Smokeless tobacco: Never    Tobacco comments:     Age: [age], quit 12 years ago   Vaping Use    Vaping status: Never Used   Substance and Sexual Activity    Alcohol use: Not Currently     Comment: Light - Rarely as of 2020    Drug use: Never    Sexual activity: Defer     Family History   Problem Relation Age of Onset    Diabetes Brother         Unspecified Type     Diabetes Maternal Grandfather         Unspecified Type    Breast cancer Paternal Grandmother     Cancer Other         Unspecified    Migraines Other         Migraine Headaches    Lung cancer Other     Malig Hyperthermia Neg Hx        Results     Result Review   The following data was reviewed by: Catrachito Sharpe MD on 03/21/2024:  Lab Results   Component Value Date    HGB 11.5 (L) 07/09/2024    HCT 37.4 07/09/2024    MCV 97.9 07/09/2024     07/09/2024    WBC 6.96 07/09/2024    NEUTROABS 3.50 07/09/2024    LYMPHSABS 2.30 07/09/2024    MONOSABS 0.63 07/09/2024    EOSABS 0.45 07/09/2024    BASOSABS 0.03 07/09/2024     Lab Results   Component Value Date    GLUCOSE 105 (H) 11/21/2023    BUN 10 11/21/2023    CREATININE 0.90 03/20/2024     11/21/2023    K 4.2 11/21/2023     11/21/2023    CO2 25.6 11/21/2023    CALCIUM 9.2 02/22/2024    PROTEINTOT 6.9 06/02/2023    ALBUMIN 4.1 06/02/2023    BILITOT 0.4 06/02/2023    ALKPHOS 50 06/02/2023    AST 14 06/02/2023    ALT 21 06/02/2023     Lab Results   Component Value Date    MG 2.0 06/21/2024    FREET4 0.72 06/17/2024    TSH 1.460 10/24/2022           No radiology results for the last day       Assessment & Plan     Diagnoses and all orders for this visit:    1. Malignant neoplasm of upper lobe of right lung (Primary)  -     Mammo Screening Digital Tomosynthesis Bilateral With CAD; Future  -     ; Future    2. Papillary thyroid carcinoma  -     Mammo Screening Digital Tomosynthesis Bilateral With CAD; Future  -     ; Future    3. Urothelial cancer  -     Mammo Screening Digital Tomosynthesis Bilateral With CAD; Future  -     ; Future    4. Encounter for screening mammogram for malignant neoplasm of breast  -     Mammo Screening Digital Tomosynthesis Bilateral With CAD; Future  -     ; Future    5. Malignant neoplasm of ovary, unspecified laterality  -     ; Future          Neda Núñez is a 69 y.o. female who presents to [facility], [place], operated by [facility] for follow up regarding H/o Urothelial Carcinoma of Renal Pelvis, RUL Non-small cell Lung cancer and most recently Papillary thyroid cancer, diagnosed 2023.    All are currently under active surveillance.  -Pt on levothyroxine which was prescribed by Dr. Pino at [facility]  -attempting to get clinic records for history from that office to confirm history    Urothelial Carcinoma of the Renal Pelvis:  - s/p left nephroureterectomy in Sept 2020  - pathology showed papillary urothelial carcinoma, invasive, high grade, invading into the renal parenchyma, margins negative, no lymph nodes submitted, pT3Nx  -currently under surveillance with Dr. Horace SNYDER Lung Cancer:  - 2014 at an OSH with NSCLC  - treated with surgery and chemotherapy  -currently under surveillance    Papillary thyroid cancer  -s/p total thyroidectomy in 2/2024 by Dr. Pino at [facility]  -[id number]    Endometrioid adenocarcinoma, FIGO grade 1 diagnosed 6/12/2024 via [facility], pT1a pN0, FIGO stage IA, Grade 1 is observation is preferred,       Left ovary granulosa cell tumor diagnosed 6/12/2024, pT1a pN0 FIGO stage IA  -reviewed NCCN guidelines that for stage I sex cord-stromal tumors  --per NCCN guidelines, recommend scans with symptoms, elevated biomarkers, but typically every 6 months for years 0-2    Recommend pt follow up with gyn/onc physician Dr. Nieto    -Plan for pt follow up in 6 weeks with repeat labs and to discuss mammogram results.      Please note that portions of this note were completed with a voice recognition program.    Electronically signed by Catrachito Sharpe MD, 07/25/24, 5:15 PM EDT.        Follow Up     I spent 45 minutes caring for Neda on this date of service. This time includes time spent by me in the following activities:preparing for the visit, reviewing tests, obtaining and/or reviewing a separately obtained history, performing a medically appropriate examination and/or  evaluation , counseling and educating the patient/family/caregiver, ordering medications, tests, or procedures, referring and communicating with other health care professionals , documenting information in the medical record, independently interpreting results and communicating that information with the patient/family/caregiver, and care coordination.    This is an acute or chronic illness that poses a threat to life or bodily function. The above treatment plan involves a high risk of complications and/or mortality of patient management.    The patient was seen and examined. Work by the provider also included review and/or ordering of lab tests, review and/or ordering of radiology tests, review and/or ordering of medicine tests, discussion with other physicians or providers, independent review of data, obtaining old records, review/summation of old records, and/or other review.    I have reviewed the family history, social history, and past medical history for this patient. Previous information and data has been reviewed and updated as needed. I have reviewed and verified the chief complaint, history, and other documentation. The patient was interviewed and examined in the clinic and the chart reviewed. The previous observations, recommendations, and conclusions were reviewed including those of other providers.     The plan was discussed with the patient and/or family. The patient was given time to ask questions and these questions were answered. At the conclusion of their visit they had no additional questions or concerns and all questions were answered to their satisfaction.    Patient was given instructions and counseling regarding her condition or for health maintenance advice. Please see specific information pulled into the AVS if appropriate.

## 2024-07-30 ENCOUNTER — TELEPHONE (OUTPATIENT)
Dept: UROLOGY | Facility: CLINIC | Age: 69
End: 2024-07-30
Payer: MEDICARE

## 2024-07-30 ENCOUNTER — LAB (OUTPATIENT)
Dept: LAB | Facility: HOSPITAL | Age: 69
End: 2024-07-30
Payer: MEDICARE

## 2024-07-30 DIAGNOSIS — N30.00 ACUTE CYSTITIS WITHOUT HEMATURIA: Primary | ICD-10-CM

## 2024-07-30 DIAGNOSIS — N30.00 ACUTE CYSTITIS WITHOUT HEMATURIA: ICD-10-CM

## 2024-07-30 PROCEDURE — 87086 URINE CULTURE/COLONY COUNT: CPT

## 2024-07-30 NOTE — TELEPHONE ENCOUNTER
Spoke with patient informing her that Dr. Vu is out of the office and her appointment on the 16th is the soonest she will see Dr. Vu. Also she states that she is having burning and discomfort with urination. She asked for a urine culture. The order is in her chart and she will proceed to the lab and leave a specimen. It will take 48 hours to get the results and we will call her with those.

## 2024-07-30 NOTE — TELEPHONE ENCOUNTER
Caller:   Jacqui Patel   Relationship:   DAUGHTER  Best call back number: [phone] (home)     What is the best time to reach you: ANY    Who are you requesting to speak with (clinical staff, provider,  specific staff member): DR NOVAK OR STAFF    Do you know the name of the person who called: DAUGHTER CALLED    What was the call regarding: PT HAD CT SCAN AND STATED WAS TOLD BY DR AHN NEEDED TO BE SEEN SOONER THAN HER APPT ON 8/16/24. PT CALLED TO TRY TO GET SOONER APPT. PLEASE CALL BACK TO DISCUSS. THANK YOU.

## 2024-07-31 LAB — BACTERIA SPEC AEROBE CULT: NORMAL

## 2024-08-01 ENCOUNTER — TELEPHONE (OUTPATIENT)
Dept: UROLOGY | Facility: CLINIC | Age: 69
End: 2024-08-01
Payer: MEDICARE

## 2024-08-01 DIAGNOSIS — N30.00 ACUTE CYSTITIS WITHOUT HEMATURIA: Primary | ICD-10-CM

## 2024-08-06 ENCOUNTER — LAB (OUTPATIENT)
Dept: LAB | Facility: HOSPITAL | Age: 69
End: 2024-08-06
Payer: MEDICARE

## 2024-08-06 DIAGNOSIS — N30.00 ACUTE CYSTITIS WITHOUT HEMATURIA: ICD-10-CM

## 2024-08-06 PROCEDURE — 87086 URINE CULTURE/COLONY COUNT: CPT

## 2024-08-08 ENCOUNTER — TELEPHONE (OUTPATIENT)
Dept: UROLOGY | Facility: CLINIC | Age: 69
End: 2024-08-08
Payer: MEDICARE

## 2024-08-08 LAB — BACTERIA SPEC AEROBE CULT: NO GROWTH

## 2024-08-08 NOTE — TELEPHONE ENCOUNTER
Spoke to patient to let her know that her culture came back normal. Patient verbalized understanding

## 2024-08-16 ENCOUNTER — PREP FOR SURGERY (OUTPATIENT)
Dept: OTHER | Facility: HOSPITAL | Age: 69
End: 2024-08-16
Payer: MEDICARE

## 2024-08-16 ENCOUNTER — PROCEDURE VISIT (OUTPATIENT)
Dept: UROLOGY | Facility: CLINIC | Age: 69
End: 2024-08-16
Payer: MEDICARE

## 2024-08-16 VITALS
DIASTOLIC BLOOD PRESSURE: 74 MMHG | WEIGHT: 135 LBS | BODY MASS INDEX: 24.84 KG/M2 | HEIGHT: 62 IN | SYSTOLIC BLOOD PRESSURE: 139 MMHG

## 2024-08-16 DIAGNOSIS — C68.9 UROTHELIAL CANCER: Primary | ICD-10-CM

## 2024-08-16 LAB
BILIRUB BLD-MCNC: NEGATIVE MG/DL
CLARITY, POC: CLEAR
COLOR UR: YELLOW
EXPIRATION DATE: ABNORMAL
GLUCOSE UR STRIP-MCNC: NEGATIVE MG/DL
KETONES UR QL: NEGATIVE
LEUKOCYTE EST, POC: NEGATIVE
Lab: ABNORMAL
NITRITE UR-MCNC: NEGATIVE MG/ML
PH UR: 5 [PH] (ref 5–8)
PROT UR STRIP-MCNC: ABNORMAL MG/DL
RBC # UR STRIP: NEGATIVE /UL
SP GR UR: 1.03 (ref 1–1.03)
UROBILINOGEN UR QL: ABNORMAL

## 2024-08-16 RX ORDER — SODIUM CHLORIDE 9 MG/ML
100 INJECTION, SOLUTION INTRAVENOUS CONTINUOUS
OUTPATIENT
Start: 2024-08-16

## 2024-08-16 RX ORDER — SODIUM CHLORIDE 0.9 % (FLUSH) 0.9 %
10 SYRINGE (ML) INJECTION AS NEEDED
OUTPATIENT
Start: 2024-08-16

## 2024-08-16 RX ORDER — SODIUM CHLORIDE 0.9 % (FLUSH) 0.9 %
10 SYRINGE (ML) INJECTION EVERY 12 HOURS SCHEDULED
OUTPATIENT
Start: 2024-08-16

## 2024-08-16 RX ORDER — SODIUM CHLORIDE 9 MG/ML
40 INJECTION, SOLUTION INTRAVENOUS AS NEEDED
OUTPATIENT
Start: 2024-08-16

## 2024-08-16 NOTE — PROGRESS NOTES
Procedures    Oncology/Hematology History Overview Note   Urothelial Carcinoma of the Renal Pelvis:  - s/p left nephroureterectomy in Sept 2020  - pathology showed papillary urothelial carcinoma, invasive, high grade, invading into the renal parenchyma, margins negative, no lymph nodes submitted, pT3Nx    RUL Lung Cancer:  - 2014 at an OSH with NSCLC  - treated with surgery and chemotherapy     Lung cancer   10/25/2021 Initial Diagnosis    Lung cancer (HCC)           Cytoscopy Procedure:     Procedure: Flexible cytoscope was passed per urethra into the bladder without difficulty after proper consent. The bladder was inspected in a systematic meridian fashion. There were no tumors, lesions, stones, or other abnormalities noted within the bladder. The flexible cytoscope was removed. The patient tolerated the procedure well.       Need cystoscopy and retrograde pyelograms in February or March at 1125.  Will call her close at that time to arrange.

## 2024-08-16 NOTE — H&P
[facility]   Urology HISTORY AND PHYSICAL    Patient Name: Neda Núñez  : 1955  MRN: [id number]  Primary Care Physician:  Renetta Talbert MD  Date of admission: (Not on file)    Subjective   Subjective     History of Present Illness  Patient has a history of renal pelvis cancer and presents for cystoscopy and retrograde pyelogram      Personal History     Past Medical History:   Diagnosis Date    Allergies     Disease of thyroid gland     Ear problem     Emphysema lung     Endometrial cancer     Kidney stone     Lung cancer     Mass of vulva     Reflux esophagitis     Thyroid cancer     Ureteral mass     Urothelial cancer 2020    (Left)    Vocal cord dysfunction     Wax in ear        Past Surgical History:   Procedure Laterality Date     SECTION      CYSTOSCOPY RETROGRADE PYELOGRAM Bilateral 2023    Procedure: BILATERAL CYSTOSCOPY RETROGRADE PYELOGRAM;  Surgeon: Kadie Vu MD;  Location: [facility];  Service: Urology;  Laterality: Bilateral;    CYSTOSCOPY URETERAL BIOPSY      CYSTOSCOPY W/ URETERAL STENT PLACEMENT      CYSTOSCOPY, URETEROSCOPY, RETROGRADE PYELOGRAM, STENT INSERTION      HYSTERECTOMY  2024    KIDNEY SURGERY      LEG SURGERY      ignacio and pins     LUNG SURGERY Right     upper lobe removed    NEPHRECTOMY Left     THYROIDECTOMY      TRANSURETHRAL RESECTION OF BLADDER TUMOR N/A 2021    Procedure: CYSTOSCOPY TRANSURETHRAL RESECTION OF BLADDER TUMOR; CYSTOSCOPY RETROGRADE PYELOGRAM right;  Surgeon: Kadie Vu MD;  Location: [facility] OR;  Service: Urology;  Laterality: N/A;    TRANSURETHRAL RESECTION OF BLADDER TUMOR N/A 2023    Procedure: CYSTOSCOPY TRANSURETHRAL RESECTION OF BLADDER TUMOR;  Surgeon: Kadie Vu MD;  Location: [facility] OR;  Service: Urology;  Laterality: N/A;    URETERECTOMY      at the same time Nephrectomy    URETEROSCOPY LASER LITHOTRIPSY WITH STENT INSERTION Right 2024    Procedure: CYSTOSCOPY,  RIGHT URETEROSCOPY, STENT EXCAHNGE;  Surgeon: Kadie Vu MD;  Location: [facility] OR;  Service: Urology;  Laterality: Right;       Family History: family history includes Breast cancer in her paternal grandmother; Cancer in an other family member; Diabetes in her brother and maternal grandfather; Lung cancer in an other family member; Migraines in an other family member. Otherwise pertinent FHx was reviewed and not pertinent to current issue.    Social History:  reports that she has quit smoking. Her smoking use included cigarettes. She has been exposed to tobacco smoke. She has never used smokeless tobacco. She reports that she does not currently use alcohol. She reports that she does not use drugs.    Home Medications:  fluticasone, levothyroxine, montelukast, and oxyCODONE    Allergies:  Allergies   Allergen Reactions    Penicillins Unknown - High Severity, Shortness Of Breath and Swelling     Tongue swelling    Throat swelling    Sulfa Antibiotics Unknown - High Severity and Hives     Itching/Rash    Percocet [Oxycodone-Acetaminophen] Irritability       Objective    Objective     Vitals:   BP: (139)/(74) 139/74    Physical Exam  Constitutional:       Appearance: Normal appearance.   Cardiovascular:      Rate and Rhythm: Normal rate and regular rhythm.   Pulmonary:      Effort: Pulmonary effort is normal.      Breath sounds: Normal breath sounds.   Neurological:      Mental Status: She is alert. Mental status is at baseline.   Psychiatric:         Mood and Affect: Mood and affect normal.         Speech: Speech normal.         Judgment: Judgment normal.         Result Review    Result Review:  I have personally reviewed the results from the time of this admission to 8/16/2024 12:22 EDT and agree with these findings:  [x]  Laboratory  []  Microbiology  [x]  Radiology  []  EKG/Telemetry   []  Cardiology/Vascular   []  Pathology  [x]  Old records  []  Other:      Assessment & Plan   Assessment / Plan        Active Hospital Problems:  There are no active hospital problems to display for this patient.      Plan: cystoscopy and retrograde pyelogram  Risks and benefits discussed with patient and they are agreeable to proceed.    VTE Prophylaxis:  No VTE prophylaxis order currently exists.        CODE STATUS:           Electronically signed by Kadie Vu MD, 08/16/24, 12:22 PM EDT.

## 2024-09-09 NOTE — PROGRESS NOTES
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 female who presents to [facility]EMATOLOGY [facility] ONCOLOGY for follow up regarding H/o Urothelial Carcinoma of Renal Pelvis, RUL Non-small cell Lung cancer and most recently Papillary thyroid cancer, diagnosed 2023.    Urothelial Carcinoma of the Renal Pelvis:  - s/p left nephroureterectomy in Sept 2020  - pathology showed papillary urothelial carcinoma, invasive, high grade, invading into the renal parenchyma, margins negative, no lymph nodes submitted, pT3Nx  -currently under surveillance     RUL Lung Cancer:  - 2014 at an OSH with NSCLC  - treated with surgery and chemotherapy  -currently under surveillance    Papillary thyroid cancer  -s/p total thyroidectomy in 2/2024 by Dr. Pino at [facility]  -[id number]    Endometrioid adenocarcinoma, FIGO grade 1 diagnosed 6/12/2024 via [facility], pT1a pN0, FIGO stage IA    Left ovary granulosa cell tumor diagnosed 6/12/2024, pT1a pN0 FIGO stage IA    No report of any fever, chills, night sweats, nausea, vomiting, diarrhea, lymphadenopathy, or unintentional weight loss.  Patient does report right areola breast is larger than the left, reports this sensation began after her BSO and complete hysterectomy at [facility] on 6/12/2024.  Patient underwent mammogram on 8/27/2024 which she is here to discuss those results.    Review of Systems   Constitutional:  Negative for appetite change, diaphoresis,  "fatigue, fever, unexpected weight gain and unexpected weight loss.   HENT:  Negative for hearing loss, mouth sores, sore throat, swollen glands, trouble swallowing and voice change.    Eyes:  Negative for blurred vision.   Respiratory:  Negative for cough, shortness of breath and wheezing.    Cardiovascular:  Negative for chest pain and palpitations.   Gastrointestinal:  Negative for abdominal pain, blood in stool, constipation, diarrhea, nausea and vomiting.   Endocrine: Positive for heat intolerance (hot flashes). Negative for cold intolerance.   Genitourinary:  Negative for difficulty urinating, dysuria, frequency, hematuria and urinary incontinence.   Musculoskeletal:  Positive for back pain. Negative for arthralgias and myalgias.   Skin:  Negative for rash, skin lesions and wound.   Neurological:  Negative for dizziness, seizures, weakness, numbness and headache.   Hematological:  Does not bruise/bleed easily.   Psychiatric/Behavioral:  Negative for depressed mood. The patient is not nervous/anxious.    All other systems reviewed and are negative.       Oncology/Hematology History Overview Note   Urothelial Carcinoma of the Renal Pelvis:  - s/p left nephroureterectomy in Sept 2020  - pathology showed papillary urothelial carcinoma, invasive, high grade, invading into the renal parenchyma, margins negative, no lymph nodes submitted, pT3Nx    RUL Lung Cancer:  - 2014 at an OSH with NSCLC  - treated with surgery and chemotherapy     Lung cancer   10/25/2021 Initial Diagnosis    Lung cancer (HCC)         Objective     Vitals:    09/11/24 1059   BP: 139/82   Pulse: 83   Resp: 18   Temp: 97.8 °F (36.6 °C)   TempSrc: Temporal   SpO2: 99%   Weight: 61.1 kg (134 lb 9.6 oz)   Height: 157.5 cm (62\")   PainSc: 0-No pain         ECOG score: 0         PHQ-9 Total Score:         Physical Exam  Vitals reviewed. Exam conducted with a chaperone present.   Constitutional:       General: She is not in acute distress.     " Appearance: Normal appearance.   HENT:      Head: Normocephalic and atraumatic.   Eyes:      Extraocular Movements: Extraocular movements intact.      Conjunctiva/sclera: Conjunctivae normal.   Cardiovascular:      Comments: No nipple changes or palpable masses in either breast and no bilateral axillary LAD palpated on exam  Pulmonary:      Effort: Pulmonary effort is normal.   Musculoskeletal:      Cervical back: Normal range of motion and neck supple.   Skin:     General: Skin is warm and dry.      Findings: No bruising.   Neurological:      Mental Status: She is oriented to person, place, and time.           Past Medical History     Past Medical History:   Diagnosis Date    Allergies     Disease of thyroid gland     Ear problem     Emphysema lung     Endometrial cancer     Kidney stone     Lung cancer     Mass of vulva     Reflux esophagitis     Thyroid cancer     Ureteral mass     Urothelial cancer 12/03/2020    (Left)    Vocal cord dysfunction     Wax in ear      Current Outpatient Medications on File Prior to Visit   Medication Sig Dispense Refill    fluticasone (FLONASE) 50 MCG/ACT nasal spray 1 spray into the nostril(s) as directed by provider Daily.      levothyroxine (SYNTHROID, LEVOTHROID) 75 MCG tablet Take 1 tablet by mouth Daily. MONDAY - SATURDAY . TO HOLD ON SUNDAY      montelukast (SINGULAIR) 10 MG tablet Take 1 tablet by mouth Every Night. (Patient not taking: Reported on 7/24/2024)      oxyCODONE (ROXICODONE) 5 MG immediate release tablet Take 1 tablet by mouth Every 4 (Four) Hours As Needed. (Patient not taking: Reported on 7/24/2024)       No current facility-administered medications on file prior to visit.      Allergies   Allergen Reactions    Penicillins Unknown - High Severity, Shortness Of Breath and Swelling     Tongue swelling    Throat swelling    Sulfa Antibiotics Unknown - High Severity and Hives     Itching/Rash    Percocet [Oxycodone-Acetaminophen] Irritability     Past Surgical  History:   Procedure Laterality Date     SECTION      CYSTOSCOPY RETROGRADE PYELOGRAM Bilateral 2023    Procedure: BILATERAL CYSTOSCOPY RETROGRADE PYELOGRAM;  Surgeon: Kadie Vu MD;  Location: [facility] MAIN OR;  Service: Urology;  Laterality: Bilateral;    CYSTOSCOPY URETERAL BIOPSY      CYSTOSCOPY W/ URETERAL STENT PLACEMENT      CYSTOSCOPY, URETEROSCOPY, RETROGRADE PYELOGRAM, STENT INSERTION      HYSTERECTOMY  2024    KIDNEY SURGERY      LEG SURGERY      ignacio and pins     LUNG SURGERY Right     upper lobe removed    NEPHRECTOMY Left     THYROIDECTOMY      TRANSURETHRAL RESECTION OF BLADDER TUMOR N/A 2021    Procedure: CYSTOSCOPY TRANSURETHRAL RESECTION OF BLADDER TUMOR; CYSTOSCOPY RETROGRADE PYELOGRAM right;  Surgeon: Kadie Vu MD;  Location: [facility] OR;  Service: Urology;  Laterality: N/A;    TRANSURETHRAL RESECTION OF BLADDER TUMOR N/A 2023    Procedure: CYSTOSCOPY TRANSURETHRAL RESECTION OF BLADDER TUMOR;  Surgeon: Kadie Vu MD;  Location: [facility] OR;  Service: Urology;  Laterality: N/A;    URETERECTOMY      at the same time Nephrectomy    URETEROSCOPY LASER LITHOTRIPSY WITH STENT INSERTION Right 2024    Procedure: CYSTOSCOPY, RIGHT URETEROSCOPY, STENT EXCAHNGE;  Surgeon: Kadie Vu MD;  Location: [facility] OR;  Service: Urology;  Laterality: Right;     Social History     Socioeconomic History    Marital status:     Number of children: 2   Tobacco Use    Smoking status: Former     Current packs/day: 2.00     Types: Cigarettes     Passive exposure: Past    Smokeless tobacco: Never    Tobacco comments:     Age: [age], quit 12 years ago   Vaping Use    Vaping status: Never Used   Substance and Sexual Activity    Alcohol use: Not Currently     Comment: Light - Rarely as of 2020    Drug use: Never    Sexual activity: Defer     Family History   Problem Relation Age of Onset    Diabetes Brother         Unspecified Type     Diabetes Maternal Grandfather         Unspecified Type    Breast cancer Paternal Grandmother     Cancer Other         Unspecified    Migraines Other         Migraine Headaches    Lung cancer Other     Malig Hyperthermia Neg Hx        Results     Result Review   The following data was reviewed by: Catrachito Sharpe MD on 03/21/2024:  Lab Results   Component Value Date    HGB 11.9 (L) 07/24/2024    HCT 39.4 07/24/2024    MCV 95.6 07/24/2024     07/24/2024    WBC 9.81 07/24/2024    NEUTROABS 6.65 07/24/2024    LYMPHSABS 2.33 07/24/2024    MONOSABS 0.54 07/24/2024    EOSABS 0.20 07/24/2024    BASOSABS 0.05 07/24/2024     Lab Results   Component Value Date    GLUCOSE 105 (H) 11/21/2023    BUN 10 11/21/2023    CREATININE 0.90 03/20/2024     11/21/2023    K 4.2 11/21/2023     11/21/2023    CO2 25.6 11/21/2023    CALCIUM 9.2 02/22/2024    PROTEINTOT 6.9 06/02/2023    ALBUMIN 4.1 06/02/2023    BILITOT 0.4 06/02/2023    ALKPHOS 50 06/02/2023    AST 14 06/02/2023    ALT 21 06/02/2023     Lab Results   Component Value Date    MG 2.0 06/21/2024    FREET4 0.95 07/24/2024    TSH 1.460 10/24/2022           No radiology results for the last day       Assessment & Plan     Diagnoses and all orders for this visit:    1. Malignant neoplasm of upper lobe of right lung (Primary)  -     NM PET/CT Skull Base to Mid Thigh; Future  -     CBC & Differential; Future  -     Comprehensive Metabolic Panel; Future            Neda Núñez is a 69 y.o. female who presents to [facility] HEMATOLOGY & ONCOLOGY for follow up regarding H/o Urothelial Carcinoma of Renal Pelvis, RUL Non-small cell Lung cancer and most recently Papillary thyroid cancer, diagnosed 2023.    All are currently under active surveillance.  -Pt on levothyroxine which was prescribed by Dr. Pino at [facility]  -attempting to get clinic records for history from that office to confirm history    Urothelial Carcinoma of the Renal Pelvis:  - s/p left  nephroureterectomy in Sept 2020  - pathology showed papillary urothelial carcinoma, invasive, high grade, invading into the renal parenchyma, margins negative, no lymph nodes submitted, pT3Nx  -currently under surveillance with Dr. Horace SNYDER Lung Cancer:  - 2014 at an OSH with NSCLC  - treated with surgery and chemotherapy  -currently under surveillance    Papillary thyroid cancer  -s/p total thyroidectomy in 2/2024 by Dr. Pino at [facility]  -[id number]    Endometrioid adenocarcinoma, FIGO grade 1 diagnosed 6/12/2024 via [facility], pT1a pN0, FIGO stage IA, Grade 1 is observation is preferred,       Left ovary granulosa cell tumor diagnosed 6/12/2024, pT1a pN0 FIGO stage IA  -reviewed NCCN guidelines that for stage I sex cord-stromal tumors  --per NCCN guidelines, recommend scans with symptoms, elevated biomarkers, but typically every 6 months for years 0-2    Recommend pt follow up with gyn/onc physician Dr. Nieto    -Mammogram from 8/27/2024 was BI-RADS 1 negative recommend repeating in 1 year    -Given patient's back pain, ordered PET/CT scan to assess for malignancy.    -Plan for pt follow up in 6 weeks with PET/CT scan results    Please note that portions of this note were completed with a voice recognition program.    Electronically signed by Catrachito Sharpe MD, 09/11/24, 1:23 PM EDT.          Follow Up     I spent 30 minutes caring for Neda on this date of service. This time includes time spent by me in the following activities:preparing for the visit, reviewing tests, obtaining and/or reviewing a separately obtained history, performing a medically appropriate examination and/or evaluation , counseling and educating the patient/family/caregiver, ordering medications, tests, or procedures, referring and communicating with other health care professionals , documenting information in the medical record, independently interpreting results and communicating that information with the  patient/family/caregiver, and care coordination.    This is an acute or chronic illness that poses a threat to life or bodily function. The above treatment plan involves a high risk of complications and/or mortality of patient management.    The patient was seen and examined. Work by the provider also included review and/or ordering of lab tests, review and/or ordering of radiology tests, review and/or ordering of medicine tests, discussion with other physicians or providers, independent review of data, obtaining old records, review/summation of old records, and/or other review.    I have reviewed the family history, social history, and past medical history for this patient. Previous information and data has been reviewed and updated as needed. I have reviewed and verified the chief complaint, history, and other documentation. The patient was interviewed and examined in the clinic and the chart reviewed. The previous observations, recommendations, and conclusions were reviewed including those of other providers.     The plan was discussed with the patient and/or family. The patient was given time to ask questions and these questions were answered. At the conclusion of their visit they had no additional questions or concerns and all questions were answered to their satisfaction.    Patient was given instructions and counseling regarding her condition or for health maintenance advice. Please see specific information pulled into the AVS if appropriate.

## 2024-09-11 ENCOUNTER — TELEPHONE (OUTPATIENT)
Dept: ONCOLOGY | Facility: HOSPITAL | Age: 69
End: 2024-09-11
Payer: MEDICARE

## 2024-09-11 ENCOUNTER — OFFICE VISIT (OUTPATIENT)
Dept: ONCOLOGY | Facility: HOSPITAL | Age: 69
End: 2024-09-11
Payer: MEDICARE

## 2024-09-11 VITALS
DIASTOLIC BLOOD PRESSURE: 82 MMHG | HEART RATE: 83 BPM | RESPIRATION RATE: 18 BRPM | SYSTOLIC BLOOD PRESSURE: 139 MMHG | OXYGEN SATURATION: 99 % | WEIGHT: 134.6 LBS | BODY MASS INDEX: 24.77 KG/M2 | TEMPERATURE: 97.8 F | HEIGHT: 62 IN

## 2024-09-11 DIAGNOSIS — C34.11 MALIGNANT NEOPLASM OF UPPER LOBE OF RIGHT LUNG: Primary | ICD-10-CM

## 2024-09-11 PROCEDURE — G0463 HOSPITAL OUTPT CLINIC VISIT: HCPCS | Performed by: INTERNAL MEDICINE

## 2024-09-11 PROCEDURE — 99214 OFFICE O/P EST MOD 30 MIN: CPT | Performed by: INTERNAL MEDICINE

## 2024-09-11 PROCEDURE — 1126F AMNT PAIN NOTED NONE PRSNT: CPT | Performed by: INTERNAL MEDICINE

## 2024-09-11 NOTE — TELEPHONE ENCOUNTER
[facility] TO TELL: ATTEMPTED TO REACH PATIENT, BUT THE LINE KEEPS HANGING UP EVERY TIME IT SOUNDS LIKE SOMEONE ANSWERS. WANTED TO LET PT KNOW THAT WE HAVE HER LAB AND PET SCAN SCHEDULED ON 10/18/24 AT 9:45AM.

## 2024-10-18 ENCOUNTER — HOSPITAL ENCOUNTER (OUTPATIENT)
Dept: PET IMAGING | Facility: HOSPITAL | Age: 69
Discharge: HOME | End: 2024-10-18
Payer: MEDICARE

## 2024-10-18 ENCOUNTER — LAB (OUTPATIENT)
Dept: ONCOLOGY | Facility: HOSPITAL | Age: 69
End: 2024-10-18
Payer: MEDICARE

## 2024-10-18 DIAGNOSIS — C34.11 MALIGNANT NEOPLASM OF UPPER LOBE OF RIGHT LUNG: ICD-10-CM

## 2024-10-18 LAB
ALBUMIN SERPL-MCNC: 4.5 G/DL (ref 3.5–5.2)
ALBUMIN/GLOB SERPL: 1.7 G/DL
ALP SERPL-CCNC: 62 U/L (ref 39–117)
ALT SERPL W P-5'-P-CCNC: 19 U/L (ref 1–33)
ANION GAP SERPL CALCULATED.3IONS-SCNC: 5.3 MMOL/L (ref 5–15)
AST SERPL-CCNC: 12 U/L (ref 1–32)
BASOPHILS # BLD AUTO: 0.05 10*3/MM3 (ref 0–0.2)
BASOPHILS NFR BLD AUTO: 0.6 % (ref 0–1.5)
BILIRUB SERPL-MCNC: 0.2 MG/DL (ref 0–1.2)
BUN SERPL-MCNC: 14 MG/DL (ref 8–23)
BUN/CREAT SERPL: 17.3 (ref 7–25)
CALCIUM SPEC-SCNC: 9.9 MG/DL (ref 8.6–10.5)
CHLORIDE SERPL-SCNC: 105 MMOL/L (ref 98–107)
CO2 SERPL-SCNC: 30.7 MMOL/L (ref 22–29)
CREAT SERPL-MCNC: 0.81 MG/DL (ref 0.57–1)
DEPRECATED RDW RBC AUTO: 45.8 FL (ref 37–54)
EGFRCR SERPLBLD CKD-EPI 2021: 79.2 ML/MIN/1.73
EOSINOPHIL # BLD AUTO: 0.3 10*3/MM3 (ref 0–0.4)
EOSINOPHIL NFR BLD AUTO: 3.5 % (ref 0.3–6.2)
ERYTHROCYTE [DISTWIDTH] IN BLOOD BY AUTOMATED COUNT: 13.5 % (ref 12.3–15.4)
GLOBULIN UR ELPH-MCNC: 2.6 GM/DL
GLUCOSE SERPL-MCNC: 103 MG/DL (ref 65–99)
HCT VFR BLD AUTO: 45 % (ref 34–46.6)
HGB BLD-MCNC: 14.3 G/DL (ref 12–15.9)
IMM GRANULOCYTES # BLD AUTO: 0.01 10*3/MM3 (ref 0–0.05)
IMM GRANULOCYTES NFR BLD AUTO: 0.1 % (ref 0–0.5)
LYMPHOCYTES # BLD AUTO: 2.17 10*3/MM3 (ref 0.7–3.1)
LYMPHOCYTES NFR BLD AUTO: 25.3 % (ref 19.6–45.3)
MCH RBC QN AUTO: 28.5 PG (ref 26.6–33)
MCHC RBC AUTO-ENTMCNC: 31.8 G/DL (ref 31.5–35.7)
MCV RBC AUTO: 89.8 FL (ref 79–97)
MONOCYTES # BLD AUTO: 0.64 10*3/MM3 (ref 0.1–0.9)
MONOCYTES NFR BLD AUTO: 7.5 % (ref 5–12)
NEUTROPHILS NFR BLD AUTO: 5.42 10*3/MM3 (ref 1.7–7)
NEUTROPHILS NFR BLD AUTO: 63 % (ref 42.7–76)
PLATELET # BLD AUTO: 337 10*3/MM3 (ref 140–450)
PMV BLD AUTO: 10.5 FL (ref 6–12)
POTASSIUM SERPL-SCNC: 4.5 MMOL/L (ref 3.5–5.2)
PROT SERPL-MCNC: 7.1 G/DL (ref 6–8.5)
RBC # BLD AUTO: 5.01 10*6/MM3 (ref 3.77–5.28)
SODIUM SERPL-SCNC: 141 MMOL/L (ref 136–145)
WBC NRBC COR # BLD AUTO: 8.59 10*3/MM3 (ref 3.4–10.8)

## 2024-10-18 PROCEDURE — 78815 PET IMAGE W/CT SKULL-THIGH: CPT

## 2024-10-18 PROCEDURE — A9552 F18 FDG: HCPCS | Performed by: INTERNAL MEDICINE

## 2024-10-18 PROCEDURE — 36415 COLL VENOUS BLD VENIPUNCTURE: CPT

## 2024-10-18 PROCEDURE — 85025 COMPLETE CBC W/AUTO DIFF WBC: CPT

## 2024-10-18 PROCEDURE — 80053 COMPREHEN METABOLIC PANEL: CPT

## 2024-10-18 PROCEDURE — 0 FLUDEOXYGLUCOSE F18 SOLUTION: Performed by: INTERNAL MEDICINE

## 2024-10-18 RX ADMIN — FLUDEOXYGLUCOSE F 18 1 DOSE: 200 INJECTION, SOLUTION INTRAVENOUS at 09:55

## 2024-10-23 ENCOUNTER — OFFICE VISIT (OUTPATIENT)
Dept: ONCOLOGY | Facility: HOSPITAL | Age: 69
End: 2024-10-23
Payer: MEDICARE

## 2024-10-23 VITALS
DIASTOLIC BLOOD PRESSURE: 75 MMHG | TEMPERATURE: 97.7 F | WEIGHT: 139.6 LBS | BODY MASS INDEX: 25.69 KG/M2 | RESPIRATION RATE: 18 BRPM | SYSTOLIC BLOOD PRESSURE: 152 MMHG | OXYGEN SATURATION: 97 % | HEART RATE: 71 BPM | HEIGHT: 62 IN

## 2024-10-23 DIAGNOSIS — R94.8 ABNORMAL POSITRON EMISSION TOMOGRAPHY (PET) SCAN: Primary | ICD-10-CM

## 2024-10-23 DIAGNOSIS — C56.9 MALIGNANT NEOPLASM OF OVARY, UNSPECIFIED LATERALITY: ICD-10-CM

## 2024-10-23 DIAGNOSIS — C73 PAPILLARY THYROID CARCINOMA: ICD-10-CM

## 2024-10-23 DIAGNOSIS — C34.11 MALIGNANT NEOPLASM OF UPPER LOBE OF RIGHT LUNG: ICD-10-CM

## 2024-10-23 DIAGNOSIS — C68.9 UROTHELIAL CANCER: ICD-10-CM

## 2024-10-23 PROCEDURE — G0463 HOSPITAL OUTPT CLINIC VISIT: HCPCS | Performed by: INTERNAL MEDICINE

## 2024-10-23 NOTE — PROGRESS NOTES
Chief Complaint/Care Team   Follow-up ( UROTHELIAL CA-6 WK FOLLOW UP, LAB & PET RESULTS)    Renetta Talbert MD Cothern, Amanda M, MD    History of Present Illness     Diagnosis: H/o Urothelial Carcinoma of Renal Pelvis    RUL Non-small cell Lung cancer    Papillary thyroid cancer, diagnosed 2023    Endometrioid adenocarcinoma, FIGO grade 1 diagnosed 6/12/2024 via [facility], pT1a pN0, FIGO stage IA    Left ovary granulosa cell tumor diagnosed 6/12/2024, pT1a pN0 FIGO stage IA      Current Treatment: Active Surveillance        Neda Núñez is a 69 y.o. female who presents to [facility] HEMATOLOGY [facility] ONCOLOGY for follow up regarding H/o Urothelial Carcinoma of Renal Pelvis, RUL Non-small cell Lung cancer and most recently Papillary thyroid cancer, diagnosed 2023.    Urothelial Carcinoma of the Renal Pelvis:  - s/p left nephroureterectomy in Sept 2020  - pathology showed papillary urothelial carcinoma, invasive, high grade, invading into the renal parenchyma, margins negative, no lymph nodes submitted, pT3Nx  -currently under surveillance     RUL Lung Cancer:  - 2014 at an OSH with NSCLC  - treated with surgery and chemotherapy  -currently under surveillance    Papillary thyroid cancer  -s/p total thyroidectomy in 2/2024 by Dr. Pino at [facility]  -[id number]    Endometrioid adenocarcinoma, FIGO grade 1 diagnosed 6/12/2024 via [facility], pT1a pN0, FIGO stage IA    Left ovary granulosa cell tumor diagnosed 6/12/2024, pT1a pN0 FIGO stage IA    Again no report of any fever, chills, night sweats, nausea, vomiting, diarrhea, lymphadenopathy, or unintentional weight loss.  She does report pain in her lower back. Pt here to discuss results of PET CT scan from 10/18/2024.    Review of Systems   Constitutional: Negative.  Negative for appetite change, diaphoresis, fatigue, fever, unexpected weight gain and unexpected weight loss.   HENT: Negative.  Negative for hearing loss, mouth sores, sore  "throat, swollen glands, trouble swallowing and voice change.    Eyes: Negative.  Negative for blurred vision.   Respiratory: Negative.  Negative for cough, shortness of breath and wheezing.    Cardiovascular: Negative.  Negative for chest pain and palpitations.   Gastrointestinal: Negative.  Negative for abdominal pain, blood in stool, constipation, diarrhea, nausea and vomiting.   Endocrine: Positive for heat intolerance (hot flashes). Negative for cold intolerance.   Genitourinary: Negative.  Negative for difficulty urinating, dysuria, frequency, hematuria and urinary incontinence.   Musculoskeletal:  Positive for back pain. Negative for arthralgias and myalgias.   Skin:  Negative for rash and wound.        hairloss   Allergic/Immunologic: Negative.    Neurological: Negative.  Negative for dizziness, seizures, weakness and numbness.   Hematological: Negative.  Does not bruise/bleed easily.   Psychiatric/Behavioral: Negative.  Negative for depressed mood. The patient is not nervous/anxious.    All other systems reviewed and are negative.       Oncology/Hematology History Overview Note   Urothelial Carcinoma of the Renal Pelvis:  - s/p left nephroureterectomy in Sept 2020  - pathology showed papillary urothelial carcinoma, invasive, high grade, invading into the renal parenchyma, margins negative, no lymph nodes submitted, pT3Nx    RUL Lung Cancer:  - 2014 at an OSH with NSCLC  - treated with surgery and chemotherapy     Lung cancer   10/25/2021 Initial Diagnosis    Lung cancer (HCC)         Objective     Vitals:    10/23/24 1003   BP: 152/75   Pulse: 71   Resp: 18   Temp: 97.7 °F (36.5 °C)   TempSrc: Temporal   SpO2: 97%   Weight: 63.3 kg (139 lb 9.6 oz)   Height: 157.5 cm (62\")   PainSc:   4   PainLoc: Back           ECOG score: 0         PHQ-9 Total Score:         Physical Exam  Vitals reviewed. Exam conducted with a chaperone present.   Constitutional:       General: She is not in acute distress.     Appearance: " Normal appearance.   HENT:      Head: Normocephalic and atraumatic.   Eyes:      Extraocular Movements: Extraocular movements intact.      Conjunctiva/sclera: Conjunctivae normal.   Cardiovascular:      Comments: No nipple changes or palpable masses in either breast and no bilateral axillary LAD palpated on exam  Pulmonary:      Effort: Pulmonary effort is normal.   Musculoskeletal:      Cervical back: Normal range of motion and neck supple.   Skin:     General: Skin is warm and dry.      Findings: No bruising.   Neurological:      Mental Status: She is oriented to person, place, and time.           Past Medical History     Past Medical History:   Diagnosis Date    Allergies     Disease of thyroid gland     Ear problem     Emphysema lung     Endometrial cancer     Kidney stone     Lung cancer     Mass of vulva     Reflux esophagitis     Thyroid cancer     Ureteral mass     Urothelial cancer 2020    (Left)    Vocal cord dysfunction     Wax in ear      Current Outpatient Medications on File Prior to Visit   Medication Sig Dispense Refill    fluticasone (FLONASE) 50 MCG/ACT nasal spray Administer 1 spray into the nostril(s) as directed by provider Daily.      levothyroxine (SYNTHROID, LEVOTHROID) 75 MCG tablet Take 1 tablet by mouth Daily. MONDAY - SATURDAY . TO HOLD ON       montelukast (SINGULAIR) 10 MG tablet Take 1 tablet by mouth Every Night.      oxyCODONE (ROXICODONE) 5 MG immediate release tablet Take 1 tablet by mouth Every 4 (Four) Hours As Needed. (Patient not taking: Reported on 10/23/2024)       No current facility-administered medications on file prior to visit.      Allergies   Allergen Reactions    Penicillins Unknown - High Severity, Shortness Of Breath and Swelling     Tongue swelling    Throat swelling    Sulfa Antibiotics Unknown - High Severity and Hives     Itching/Rash    Percocet [Oxycodone-Acetaminophen] Irritability     Past Surgical History:   Procedure Laterality Date      SECTION      CYSTOSCOPY RETROGRADE PYELOGRAM Bilateral 01/12/2023    Procedure: BILATERAL CYSTOSCOPY RETROGRADE PYELOGRAM;  Surgeon: Kadie Vu MD;  Location: [facility] MAIN OR;  Service: Urology;  Laterality: Bilateral;    CYSTOSCOPY URETERAL BIOPSY      CYSTOSCOPY W/ URETERAL STENT PLACEMENT      CYSTOSCOPY, URETEROSCOPY, RETROGRADE PYELOGRAM, STENT INSERTION      HYSTERECTOMY  06/12/2024    KIDNEY SURGERY      LEG SURGERY      ignacio and pins     LUNG SURGERY Right     upper lobe removed    NEPHRECTOMY Left     THYROIDECTOMY      TRANSURETHRAL RESECTION OF BLADDER TUMOR N/A 11/02/2021    Procedure: CYSTOSCOPY TRANSURETHRAL RESECTION OF BLADDER TUMOR; CYSTOSCOPY RETROGRADE PYELOGRAM right;  Surgeon: Kadie Vu MD;  Location: [facility] MAIN OR;  Service: Urology;  Laterality: N/A;    TRANSURETHRAL RESECTION OF BLADDER TUMOR N/A 08/01/2023    Procedure: CYSTOSCOPY TRANSURETHRAL RESECTION OF BLADDER TUMOR;  Surgeon: Kadie Vu MD;  Location: [facility] OR;  Service: Urology;  Laterality: N/A;    URETERECTOMY      at the same time Nephrectomy    URETEROSCOPY LASER LITHOTRIPSY WITH STENT INSERTION Right 7/11/2024    Procedure: CYSTOSCOPY, RIGHT URETEROSCOPY, STENT EXCAHNGE;  Surgeon: Kadie Vu MD;  Location: [facility] MAIN OR;  Service: Urology;  Laterality: Right;     Social History     Socioeconomic History    Marital status:     Number of children: 2   Tobacco Use    Smoking status: Former     Current packs/day: 2.00     Types: Cigarettes     Passive exposure: Past    Smokeless tobacco: Never    Tobacco comments:     Age: [age], quit 12 years ago   Vaping Use    Vaping status: Never Used   Substance and Sexual Activity    Alcohol use: Not Currently     Comment: Light - Rarely as of 7/30/2020    Drug use: Never    Sexual activity: Defer     Family History   Problem Relation Age of Onset    Diabetes Brother         Unspecified Type    Diabetes Maternal Grandfather         Unspecified Type     Breast cancer Paternal Grandmother     Cancer Other         Unspecified    Migraines Other         Migraine Headaches    Lung cancer Other     Malig Hyperthermia Neg Hx        Results     Result Review   The following data was reviewed by: Catrachito Sharpe MD on 03/21/2024:  Lab Results   Component Value Date    HGB 14.3 10/18/2024    HCT 45.0 10/18/2024    MCV 89.8 10/18/2024     10/18/2024    WBC 8.59 10/18/2024    NEUTROABS 5.42 10/18/2024    LYMPHSABS 2.17 10/18/2024    MONOSABS 0.64 10/18/2024    EOSABS 0.30 10/18/2024    BASOSABS 0.05 10/18/2024     Lab Results   Component Value Date    GLUCOSE 103 (H) 10/18/2024    BUN 14 10/18/2024    CREATININE 0.81 10/18/2024     10/18/2024    K 4.5 10/18/2024     10/18/2024    CO2 30.7 (H) 10/18/2024    CALCIUM 9.9 10/18/2024    PROTEINTOT 7.1 10/18/2024    ALBUMIN 4.5 10/18/2024    BILITOT 0.2 10/18/2024    ALKPHOS 62 10/18/2024    AST 12 10/18/2024    ALT 19 10/18/2024     Lab Results   Component Value Date    MG 2.0 06/21/2024    FREET4 1.01 09/18/2024    TSH 1.460 10/24/2022           No radiology results for the last day  NM PET/CT Skull Base to Mid Thigh    Result Date: 10/22/2024  Impression: Volume loss and scarring is seen in the right upper lobe similar to previous exam. Mild metabolic has slightly increased but the appearance is unchanged. There is also mild scarring or atelectasis in the right lower lobe. Mild metabolic activity in the distal thoracic esophagus could be inflammatory. Consider esophagram or direct visualization. There has been interval hysterectomy. There is a 4.1 cm low-density fluid in the region of the vaginal cuff that appears smaller than prior CT 7/23/2024 and was previously suspected to be hematoma. There is mild peripheral metabolic activity. Recommend continued follow-up. Left pelvic sidewall fluid collection appears smaller than prior CT 7/23/2024 without metabolic activity could reflect postoperative seroma or  lymphocele. This was previously aspirated at outside hospital. Small focus of metabolic activity in the left adnexal region measuring 0.7 cm. Patient reportedly had a left ovary granulosa cell tumor and presumed oophorectomy. The amount of soft tissue appears decreased compared to prior CT 7/23/2024 and could reflect postoperative change. Recommend continued attention follow-up. Focal abnormal appearing metabolic activity in the region of the left vagina on image 243 measuring about 1 cm with a max SUV 15.8 similar in size and appearance to prior PET scan 2/20/2024. Previous MRI was performed at outside hospital. Correlate with exam. Focal area of metabolic activity in the right proximal femur measuring about 1 cm max SUV 3.5 with small lesion. This appears similar to prior PET scan. Recommend MRI right femur with contrast to further evaluate. Electronically Signed: Rustam Orona MD  10/22/2024 10:33 AM EDT  Workstation ID: [id number]     Assessment & Plan     Diagnoses and all orders for this visit:    1. Abnormal positron emission tomography (PET) scan (Primary)  -     MRI femur right w wo contrast; Future  -     NM Bone Scan Whole Body; Future  -     Ambulatory Referral to Gastroenterology    2. Malignant neoplasm of upper lobe of right lung  -     CBC & Differential; Future  -     Comprehensive Metabolic Panel; Future    3. Papillary thyroid carcinoma  -     TSH+Free T4; Future  -     T3, Free; Future    4. Urothelial cancer    5. Malignant neoplasm of ovary, unspecified laterality  -     ; Future              Neda Núñez is a 69 y.o. female who presents to [facility] HEMATOLOGY & ONCOLOGY for follow up regarding H/o Urothelial Carcinoma of Renal Pelvis, RUL Non-small cell Lung cancer and most recently Papillary thyroid cancer, diagnosed 2023.    All are currently under active surveillance.  -Pt on levothyroxine which was prescribed by Dr. Pino at [facility]  -attempting to get clinic  records for history from that office to confirm history    Urothelial Carcinoma of the Renal Pelvis:  - s/p left nephroureterectomy in Sept 2020  - pathology showed papillary urothelial carcinoma, invasive, high grade, invading into the renal parenchyma, margins negative, no lymph nodes submitted, pT3Nx  -currently under surveillance with Dr. Horace SNYDER Lung Cancer:  - 2014 at an OSH with NSCLC  - treated with surgery and chemotherapy  -currently under surveillance    Papillary thyroid cancer  -s/p total thyroidectomy in 2/2024 by Dr. Pino at [facility]  -[id number]    Endometrioid adenocarcinoma, FIGO grade 1 diagnosed 6/12/2024 via [facility], pT1a pN0, FIGO stage IA, Grade 1 is observation is preferred,       Left ovary granulosa cell tumor diagnosed 6/12/2024, pT1a pN0 FIGO stage IA  -reviewed NCCN guidelines that for stage I sex cord-stromal tumors  --per NCCN guidelines, recommend scans with symptoms, elevated biomarkers, but typically every 6 months for years 0-2    Recommend pt follow up with gyn/onc physician Dr. Nieto    -Mammogram from 8/27/2024 was BI-RADS 1 negative recommend repeating in 1 year    -Given patient's back pain, ordered PET/CT scan to assess for malignancy, which occurred on 10/18/2024 which showed focal area of metabolic activity right proximal femur, mild metabolic activity in distal thoracic esophagus possible inflammatory, esophagram or direct visualization was recommended, decrease in size of fluid collection since her surgery, otherwise no other evidence of disease recurrence, pt reports recent pelvic exam by gyn/onc was unremarkable, recommend she continue with pelvic exams with gyn/onc  -Thus, will order MRI right femur today, also refer patient to GI for consideration for EGD,  mild metabolic activity in distal thoracic esophagus     -Plan for pt follow up in 4 weeks with MRI right femur results and NM bone scan results and labs CBC, CMP, .    Please note that  portions of this note were completed with a voice recognition program.      Electronically signed by Catrachito Sharpe MD, 10/23/24, 2:41 PM EDT.      Follow Up     I spent 30 minutes caring for Neda on this date of service. This time includes time spent by me in the following activities:preparing for the visit, reviewing tests, obtaining and/or reviewing a separately obtained history, performing a medically appropriate examination and/or evaluation , counseling and educating the patient/family/caregiver, ordering medications, tests, or procedures, referring and communicating with other health care professionals , documenting information in the medical record, independently interpreting results and communicating that information with the patient/family/caregiver, and care coordination.    This is an acute or chronic illness that poses a threat to life or bodily function. The above treatment plan involves a high risk of complications and/or mortality of patient management.    The patient was seen and examined. Work by the provider also included review and/or ordering of lab tests, review and/or ordering of radiology tests, review and/or ordering of medicine tests, discussion with other physicians or providers, independent review of data, obtaining old records, review/summation of old records, and/or other review.    I have reviewed the family history, social history, and past medical history for this patient. Previous information and data has been reviewed and updated as needed. I have reviewed and verified the chief complaint, history, and other documentation. The patient was interviewed and examined in the clinic and the chart reviewed. The previous observations, recommendations, and conclusions were reviewed including those of other providers.     The plan was discussed with the patient and/or family. The patient was given time to ask questions and these questions were answered. At the conclusion of their visit they  had no additional questions or concerns and all questions were answered to their satisfaction.    Patient was given instructions and counseling regarding her condition or for health maintenance advice. Please see specific information pulled into the AVS if appropriate.

## 2024-10-29 ENCOUNTER — OFFICE VISIT (OUTPATIENT)
Dept: GASTROENTEROLOGY | Facility: CLINIC | Age: 69
End: 2024-10-29
Payer: MEDICARE

## 2024-10-29 VITALS
HEIGHT: 62 IN | BODY MASS INDEX: 25.69 KG/M2 | HEART RATE: 81 BPM | WEIGHT: 139.6 LBS | SYSTOLIC BLOOD PRESSURE: 152 MMHG | DIASTOLIC BLOOD PRESSURE: 81 MMHG

## 2024-10-29 DIAGNOSIS — Z85.43 HISTORY OF OVARIAN CANCER: ICD-10-CM

## 2024-10-29 DIAGNOSIS — Z85.118 HISTORY OF LUNG CANCER: ICD-10-CM

## 2024-10-29 DIAGNOSIS — Z85.528 HISTORY OF KIDNEY CANCER: ICD-10-CM

## 2024-10-29 DIAGNOSIS — R93.3 ABNORMAL FINDING ON GI TRACT IMAGING: Primary | ICD-10-CM

## 2024-10-29 DIAGNOSIS — K21.9 GASTROESOPHAGEAL REFLUX DISEASE, UNSPECIFIED WHETHER ESOPHAGITIS PRESENT: ICD-10-CM

## 2024-10-29 PROCEDURE — 1159F MED LIST DOCD IN RCRD: CPT | Performed by: NURSE PRACTITIONER

## 2024-10-29 PROCEDURE — 1160F RVW MEDS BY RX/DR IN RCRD: CPT | Performed by: NURSE PRACTITIONER

## 2024-10-29 PROCEDURE — 99204 OFFICE O/P NEW MOD 45 MIN: CPT | Performed by: NURSE PRACTITIONER

## 2024-10-29 RX ORDER — SODIUM, POTASSIUM,MAG SULFATES 17.5-3.13G
2 SOLUTION, RECONSTITUTED, ORAL ORAL TAKE AS DIRECTED
Qty: 177 ML | Refills: 0 | Status: SHIPPED | OUTPATIENT
Start: 2024-10-29

## 2024-10-29 NOTE — PROGRESS NOTES
Chief Complaint        Abnormal Imaging    History of Present Illness      Neda Núñez is a 69 y.o. female who presents to [facility] as a new patient for abnormal imaging.    She has hx 5 different cancers. (Endometrial, thyroid, kidney, ovarian and lung) . She had lung cancer (non-small cell lung cancer) initially 13 years ago. She had right lobe lumpectomy at that time. She also has her esophagus scraped at that time per patient report. 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  Also history of total hysterectomy.      She gets regular scans for surveillance.  She had a PET scan done on 10/18/2024 that showed mild metabolic activity in the distal thoracic esophagus could be inflammatory.  Consider esophagram or direct visualization.    She does have hx GERD and HH. Denies any dysphagia or reflux today. She admits her bowels move well everyday. She denies any rectal bleeding or melena.  Good appetite.  Weight is stable.    Her last EGD and colonoscopy was done by Dr. Castano on 3/31/2021.  EGD was normal.  Colonoscopy showed mild diverticulosis.  Repeat colonoscopy in 10 years.    GI FH---denies any     Results       Result Review :   The following data was reviewed by: NAHOMY Up on 10/29/2024     CMP          2/22/2024    14:19 3/20/2024    16:39 10/18/2024    11:00   CMP   Glucose   103    BUN   14    Creatinine  0.90  0.81    EGFR  70.2  79.2    Sodium   141    Potassium   4.5    Chloride   105    Calcium 9.2      9.9    Total Protein   7.1    Albumin   4.5    Globulin   2.6    Total Bilirubin   0.2    Alkaline Phosphatase   62    AST (SGOT)   12    ALT (SGPT)   19    Albumin/Globulin Ratio   1.7    BUN/Creatinine Ratio   17.3    Anion Gap   5.3       Details          This result is from an external source.             CBC          7/9/2024    11:58 7/24/2024    11:13 10/18/2024    11:00   CBC   WBC 6.96     9.81     8.59    RBC 3.82      "4.12     5.01    Hemoglobin 11.5     11.9     14.3    Hematocrit 37.4     39.4     45.0    MCV 97.9     95.6     89.8    MCH 30.1     28.9     28.5    MCHC 30.7     30.2     31.8    RDW 14.4     13.5     13.5    Platelets 418     399     337       Details          This result is from an external source.             CBC w/diff          2024    11:58 2024    11:13 10/18/2024    11:00   CBC w/Diff   WBC 6.96     9.81     8.59    RBC 3.82     4.12     5.01    Hemoglobin 11.5     11.9     14.3    Hematocrit 37.4     39.4     45.0    MCV 97.9     95.6     89.8    MCH 30.1     28.9     28.5    MCHC 30.7     30.2     31.8    RDW 14.4     13.5     13.5    Platelets 418     399     337    Neutrophil Rel % 50.9     67.8     63.0    Immature Granulocyte Rel % 0.1     0.4     0.1    Lymphocyte Rel % 33.0     23.8     25.3    Monocyte Rel % 9.1     5.5     7.5    Eosinophil Rel % 6.5     2.0     3.5    Basophil Rel % 0.4     0.5     0.6       Details          This result is from an external source.                   Lipase No results found for: \"LIPASE\"  Amylase No results found for: \"AMYLASE\"  Iron Profile No results found for: \"IRON\", \"TIBC\", \"LABIRON\", \"TRANSFERRIN\"  Ferritin No results found for: \"FERRITIN\"  ESR (Sed Rate)   Sed Rate   Date Value Ref Range Status   2020 6 0 - 30 mm/h Final     CRP (C-Reactive) No results found for: \"CRP\"         Past Medical History       Past Medical History:   Diagnosis Date    Allergies     Disease of thyroid gland     Ear problem     Emphysema lung     Endometrial cancer     Kidney stone     Lung cancer     Mass of vulva     Reflux esophagitis     Thyroid cancer     Ureteral mass     Urothelial cancer 2020    (Left)    Vocal cord dysfunction     Wax in ear        Past Surgical History:   Procedure Laterality Date     SECTION      CYSTOSCOPY RETROGRADE PYELOGRAM Bilateral 2023    Procedure: BILATERAL CYSTOSCOPY RETROGRADE PYELOGRAM;  Surgeon: Horace " Kadie PALMA MD;  Location: [facility] MAIN OR;  Service: Urology;  Laterality: Bilateral;    CYSTOSCOPY URETERAL BIOPSY      CYSTOSCOPY W/ URETERAL STENT PLACEMENT      CYSTOSCOPY, URETEROSCOPY, RETROGRADE PYELOGRAM, STENT INSERTION      HYSTERECTOMY  06/12/2024    KIDNEY SURGERY      LEG SURGERY      ignacio and pins     LUNG SURGERY Right     upper lobe removed    NEPHRECTOMY Left     THYROIDECTOMY      TRANSURETHRAL RESECTION OF BLADDER TUMOR N/A 11/02/2021    Procedure: CYSTOSCOPY TRANSURETHRAL RESECTION OF BLADDER TUMOR; CYSTOSCOPY RETROGRADE PYELOGRAM right;  Surgeon: Kadie Vu MD;  Location: [facility] MAIN OR;  Service: Urology;  Laterality: N/A;    TRANSURETHRAL RESECTION OF BLADDER TUMOR N/A 08/01/2023    Procedure: CYSTOSCOPY TRANSURETHRAL RESECTION OF BLADDER TUMOR;  Surgeon: Kadie Vu MD;  Location: [facility] MAIN OR;  Service: Urology;  Laterality: N/A;    URETERECTOMY      at the same time Nephrectomy    URETEROSCOPY LASER LITHOTRIPSY WITH STENT INSERTION Right 7/11/2024    Procedure: CYSTOSCOPY, RIGHT URETEROSCOPY, STENT EXCAHNGE;  Surgeon: Kadie Vu MD;  Location: [facility] OR;  Service: Urology;  Laterality: Right;         Current Outpatient Medications:     fluticasone (FLONASE) 50 MCG/ACT nasal spray, Administer 1 spray into the nostril(s) as directed by provider Daily., Disp: , Rfl:     levothyroxine (SYNTHROID, LEVOTHROID) 75 MCG tablet, Take 1 tablet by mouth Daily. MONDAY - SATURDAY . TO HOLD ON SUNDAY, Disp: , Rfl:     montelukast (SINGULAIR) 10 MG tablet, Take 1 tablet by mouth Every Night., Disp: , Rfl:     sodium-potassium-magnesium sulfates (Suprep Bowel Prep Kit) 17.5-3.13-1.6 GM/177ML solution oral solution, Take 2 bottles by mouth Take As Directed., Disp: 177 mL, Rfl: 0     Allergies   Allergen Reactions    Penicillins Unknown - High Severity, Shortness Of Breath and Swelling     Tongue swelling    Throat swelling    Sulfa Antibiotics Unknown - High Severity and Hives      "Itching/Rash    Percocet [Oxycodone-Acetaminophen] Irritability       Family History   Problem Relation Age of Onset    Diabetes Brother         Unspecified Type    Diabetes Maternal Grandfather         Unspecified Type    Breast cancer Paternal Grandmother     Cancer Other         Unspecified    Migraines Other         Migraine Headaches    Lung cancer Other     Malig Hyperthermia Neg Hx         Social History     Social History Narrative    Not on file       Objective       Objective     Vital Signs:   /81 (BP Location: Left arm, Patient Position: Sitting, Cuff Size: Adult)   Pulse 81   Ht 157.5 cm (62\")   Wt 63.3 kg (139 lb 9.6 oz)   BMI 25.53 kg/m²     Body mass index is 25.53 kg/m².    Review of Systems   Constitutional:  Negative for appetite change, chills, diaphoresis, fatigue, fever and unexpected weight change.   HENT:  Negative for nosebleeds, postnasal drip, sore throat, trouble swallowing and voice change.    Respiratory:  Negative for cough, choking, chest tightness, shortness of breath, wheezing and stridor.    Cardiovascular:  Negative for chest pain, palpitations and leg swelling.   Gastrointestinal:  Negative for abdominal distention, abdominal pain, anal bleeding, blood in stool, constipation, diarrhea, nausea, rectal pain and vomiting.   Endocrine: Negative for polydipsia, polyphagia and polyuria.   Musculoskeletal:  Negative for gait problem.   Skin:  Negative for rash and wound.   Allergic/Immunologic: Negative for food allergies.   Neurological:  Negative for dizziness, speech difficulty and light-headedness.   Psychiatric/Behavioral:  Negative for confusion, self-injury, sleep disturbance and suicidal ideas.         Physical Exam  Constitutional:       General: She is not in acute distress.     Appearance: She is well-developed. She is not ill-appearing.   HENT:      Head: Normocephalic.   Eyes:      Pupils: Pupils are equal, round, and reactive to light.   Cardiovascular:      Rate " and Rhythm: Normal rate and regular rhythm.      Heart sounds: Normal heart sounds.   Pulmonary:      Effort: Pulmonary effort is normal.      Breath sounds: Normal breath sounds.   Abdominal:      General: Bowel sounds are normal. There is no distension.      Palpations: Abdomen is soft. There is no mass.      Tenderness: There is no abdominal tenderness. There is no guarding or rebound.      Hernia: No hernia is present.   Musculoskeletal:         General: Normal range of motion.   Skin:     General: Skin is warm and dry.   Neurological:      Mental Status: She is alert and oriented to person, place, and time.   Psychiatric:         Speech: Speech normal.         Behavior: Behavior normal.         Judgment: Judgment normal.              Assessment & Plan          Assessment and Plan    Diagnoses and all orders for this visit:    1. Abnormal finding on GI tract imaging (Primary)  -     Case Request; Standing  -     Follow Anesthesia Guidelines / Protocol; Future  -     Case Request  -     sodium-potassium-magnesium sulfates (Suprep Bowel Prep Kit) 17.5-3.13-1.6 GM/177ML solution oral solution; Take 2 bottles by mouth Take As Directed.  Dispense: 177 mL; Refill: 0    2. Gastroesophageal reflux disease, unspecified whether esophagitis present  -     Case Request; Standing  -     Follow Anesthesia Guidelines / Protocol; Future  -     Case Request  -     sodium-potassium-magnesium sulfates (Suprep Bowel Prep Kit) 17.5-3.13-1.6 GM/177ML solution oral solution; Take 2 bottles by mouth Take As Directed.  Dispense: 177 mL; Refill: 0    3. History of lung cancer  -     Case Request; Standing  -     Follow Anesthesia Guidelines / Protocol; Future  -     Case Request  -     sodium-potassium-magnesium sulfates (Suprep Bowel Prep Kit) 17.5-3.13-1.6 GM/177ML solution oral solution; Take 2 bottles by mouth Take As Directed.  Dispense: 177 mL; Refill: 0    4. History of kidney cancer  -     Case Request; Standing  -     Follow  Anesthesia Guidelines / Protocol; Future  -     Case Request  -     sodium-potassium-magnesium sulfates (Suprep Bowel Prep Kit) 17.5-3.13-1.6 GM/177ML solution oral solution; Take 2 bottles by mouth Take As Directed.  Dispense: 177 mL; Refill: 0    5. History of ovarian cancer  -     Case Request; Standing  -     Follow Anesthesia Guidelines / Protocol; Future  -     Case Request  -     sodium-potassium-magnesium sulfates (Suprep Bowel Prep Kit) 17.5-3.13-1.6 GM/177ML solution oral solution; Take 2 bottles by mouth Take As Directed.  Dispense: 177 mL; Refill: 0    Other orders  -     Verify NPO; Standing  -     Verify Bowel Prep Was Successful; Standing  -     Give Tap Water Enema If Bowel Prep Insufficient; Standing      Reviewed medical history with her today.  Reviewed most recent imaging and the PET scan results.  Given her history and current symptoms recommend EGD and colonoscopy with Dr. Rodriguez for further evaluation.  Patient is agreeable to the scopes.  No blood thinners, no clearances and Suprep.  Patient to call the office with any issues.  Patient to follow-up with me after her scopes.  Patient is agreeable to the plan.    Surgical Risk and Benefits discussed: Possible risks/complications, benefits, and alternatives to surgical or invasive procedure have been explained to patient and/or legal guardian; risks include bleeding, infection, and perforation. Patient has been evaluated and can tolerate anesthesia and/or sedation. Risks, benefits, and alternatives to anesthesia and sedation have been explained to patient and/or legal guardian.          Follow Up       Follow Up   Return for F/U AFTER PROCEDURE.  Patient was given instructions and counseling regarding her condition or for health maintenance advice. Please see specific information pulled into the AVS if appropriate.

## 2024-11-26 ENCOUNTER — HOSPITAL ENCOUNTER (OUTPATIENT)
Dept: NUCLEAR MEDICINE | Facility: HOSPITAL | Age: 69
Discharge: HOME | End: 2024-11-26
Payer: MEDICARE

## 2024-11-26 ENCOUNTER — HOSPITAL ENCOUNTER (OUTPATIENT)
Dept: MRI IMAGING | Facility: HOSPITAL | Age: 69
Discharge: HOME | End: 2024-11-26
Payer: MEDICARE

## 2024-11-26 DIAGNOSIS — R94.8 ABNORMAL POSITRON EMISSION TOMOGRAPHY (PET) SCAN: ICD-10-CM

## 2024-11-26 LAB
CREAT BLDA-MCNC: 1 MG/DL (ref 0.6–1.3)
EGFRCR SERPLBLD CKD-EPI 2021: 61.5 ML/MIN/1.73

## 2024-11-26 PROCEDURE — 73720 MRI LWR EXTREMITY W/O&W/DYE: CPT

## 2024-11-26 PROCEDURE — 82565 ASSAY OF CREATININE: CPT

## 2024-11-26 PROCEDURE — 25510000002 GADOBENATE DIMEGLUMINE 529 MG/ML SOLUTION: Performed by: INTERNAL MEDICINE

## 2024-11-26 PROCEDURE — A9577 INJ MULTIHANCE: HCPCS | Performed by: INTERNAL MEDICINE

## 2024-11-26 PROCEDURE — 78306 BONE IMAGING WHOLE BODY: CPT

## 2024-11-26 PROCEDURE — A9503 TC99M MEDRONATE: HCPCS | Performed by: INTERNAL MEDICINE

## 2024-11-26 PROCEDURE — 34310000005 TECHNETIUM MEDRONATE KIT: Performed by: INTERNAL MEDICINE

## 2024-11-26 RX ORDER — TC 99M MEDRONATE 20 MG/10ML
22 INJECTION, POWDER, LYOPHILIZED, FOR SOLUTION INTRAVENOUS
Status: COMPLETED | OUTPATIENT
Start: 2024-11-26 | End: 2024-11-26

## 2024-11-26 RX ADMIN — TC 99M MEDRONATE 22 MILLICURIE: 20 INJECTION, POWDER, LYOPHILIZED, FOR SOLUTION INTRAVENOUS at 12:30

## 2024-11-26 RX ADMIN — GADOBENATE DIMEGLUMINE 15 ML: 529 INJECTION, SOLUTION INTRAVENOUS at 12:47

## 2024-12-03 ENCOUNTER — LAB (OUTPATIENT)
Dept: LAB | Facility: HOSPITAL | Age: 69
End: 2024-12-03
Payer: MEDICARE

## 2024-12-03 DIAGNOSIS — C73 PAPILLARY THYROID CARCINOMA: ICD-10-CM

## 2024-12-03 DIAGNOSIS — C34.11 MALIGNANT NEOPLASM OF UPPER LOBE OF RIGHT LUNG: ICD-10-CM

## 2024-12-03 DIAGNOSIS — C56.9 MALIGNANT NEOPLASM OF OVARY, UNSPECIFIED LATERALITY: ICD-10-CM

## 2024-12-03 LAB
ALBUMIN SERPL-MCNC: 4.4 G/DL (ref 3.5–5.2)
ALBUMIN/GLOB SERPL: 1.5 G/DL
ALP SERPL-CCNC: 57 U/L (ref 39–117)
ALT SERPL W P-5'-P-CCNC: 20 U/L (ref 1–33)
ANION GAP SERPL CALCULATED.3IONS-SCNC: 9.1 MMOL/L (ref 5–15)
AST SERPL-CCNC: 16 U/L (ref 1–32)
BASOPHILS # BLD AUTO: 0.03 10*3/MM3 (ref 0–0.2)
BASOPHILS NFR BLD AUTO: 0.4 % (ref 0–1.5)
BILIRUB SERPL-MCNC: 0.5 MG/DL (ref 0–1.2)
BUN SERPL-MCNC: 11 MG/DL (ref 8–23)
BUN/CREAT SERPL: 11.1 (ref 7–25)
CALCIUM SPEC-SCNC: 9.3 MG/DL (ref 8.6–10.5)
CANCER AG125 SERPL QL: 9.6 U/ML (ref 0–38.1)
CHLORIDE SERPL-SCNC: 104 MMOL/L (ref 98–107)
CO2 SERPL-SCNC: 27.9 MMOL/L (ref 22–29)
CREAT SERPL-MCNC: 0.99 MG/DL (ref 0.57–1)
DEPRECATED RDW RBC AUTO: 48.7 FL (ref 37–54)
EGFRCR SERPLBLD CKD-EPI 2021: 62.2 ML/MIN/1.73
EOSINOPHIL # BLD AUTO: 0.16 10*3/MM3 (ref 0–0.4)
EOSINOPHIL NFR BLD AUTO: 2.2 % (ref 0.3–6.2)
ERYTHROCYTE [DISTWIDTH] IN BLOOD BY AUTOMATED COUNT: 14.4 % (ref 12.3–15.4)
GLOBULIN UR ELPH-MCNC: 2.9 GM/DL
GLUCOSE SERPL-MCNC: 87 MG/DL (ref 65–99)
HCT VFR BLD AUTO: 43.5 % (ref 34–46.6)
HGB BLD-MCNC: 13.9 G/DL (ref 12–15.9)
IMM GRANULOCYTES # BLD AUTO: 0.01 10*3/MM3 (ref 0–0.05)
IMM GRANULOCYTES NFR BLD AUTO: 0.1 % (ref 0–0.5)
LYMPHOCYTES # BLD AUTO: 2.12 10*3/MM3 (ref 0.7–3.1)
LYMPHOCYTES NFR BLD AUTO: 29.3 % (ref 19.6–45.3)
MCH RBC QN AUTO: 28.9 PG (ref 26.6–33)
MCHC RBC AUTO-ENTMCNC: 32 G/DL (ref 31.5–35.7)
MCV RBC AUTO: 90.4 FL (ref 79–97)
MONOCYTES # BLD AUTO: 0.5 10*3/MM3 (ref 0.1–0.9)
MONOCYTES NFR BLD AUTO: 6.9 % (ref 5–12)
NEUTROPHILS NFR BLD AUTO: 4.42 10*3/MM3 (ref 1.7–7)
NEUTROPHILS NFR BLD AUTO: 61.1 % (ref 42.7–76)
PLATELET # BLD AUTO: 347 10*3/MM3 (ref 140–450)
PMV BLD AUTO: 10.5 FL (ref 6–12)
POTASSIUM SERPL-SCNC: 3.9 MMOL/L (ref 3.5–5.2)
PROT SERPL-MCNC: 7.3 G/DL (ref 6–8.5)
RBC # BLD AUTO: 4.81 10*6/MM3 (ref 3.77–5.28)
SODIUM SERPL-SCNC: 141 MMOL/L (ref 136–145)
T3FREE SERPL-MCNC: 2.23 PG/ML (ref 2–4.4)
T4 FREE SERPL-MCNC: 1.17 NG/DL (ref 0.92–1.68)
TSH SERPL DL<=0.05 MIU/L-ACNC: 5.63 UIU/ML (ref 0.27–4.2)
WBC NRBC COR # BLD AUTO: 7.24 10*3/MM3 (ref 3.4–10.8)

## 2024-12-03 PROCEDURE — 85025 COMPLETE CBC W/AUTO DIFF WBC: CPT

## 2024-12-03 PROCEDURE — 36415 COLL VENOUS BLD VENIPUNCTURE: CPT

## 2024-12-03 PROCEDURE — 86304 IMMUNOASSAY TUMOR CA 125: CPT

## 2024-12-03 PROCEDURE — 84439 ASSAY OF FREE THYROXINE: CPT

## 2024-12-03 PROCEDURE — 84443 ASSAY THYROID STIM HORMONE: CPT

## 2024-12-03 PROCEDURE — 84481 FREE ASSAY (FT-3): CPT

## 2024-12-03 PROCEDURE — 80053 COMPREHEN METABOLIC PANEL: CPT

## 2024-12-05 ENCOUNTER — OFFICE VISIT (OUTPATIENT)
Dept: ONCOLOGY | Facility: HOSPITAL | Age: 69
End: 2024-12-05
Payer: MEDICARE

## 2024-12-05 VITALS
OXYGEN SATURATION: 96 % | BODY MASS INDEX: 25.65 KG/M2 | HEIGHT: 62 IN | HEART RATE: 78 BPM | TEMPERATURE: 97.7 F | SYSTOLIC BLOOD PRESSURE: 158 MMHG | RESPIRATION RATE: 18 BRPM | DIASTOLIC BLOOD PRESSURE: 90 MMHG | WEIGHT: 139.4 LBS

## 2024-12-05 DIAGNOSIS — R93.7 ABNORMAL MRI SCAN, BONE: Primary | ICD-10-CM

## 2024-12-05 DIAGNOSIS — C34.11 MALIGNANT NEOPLASM OF UPPER LOBE OF RIGHT LUNG: ICD-10-CM

## 2024-12-05 DIAGNOSIS — R94.8 ABNORMAL POSITRON EMISSION TOMOGRAPHY (PET) SCAN: ICD-10-CM

## 2024-12-05 PROCEDURE — G0463 HOSPITAL OUTPT CLINIC VISIT: HCPCS | Performed by: INTERNAL MEDICINE

## 2024-12-05 NOTE — PROGRESS NOTES
Chief Complaint/Care Team   UROTHELIAL CA    Renetta Talbert MD Cothern, Amanda M, MD    History of Present Illness     Diagnosis: H/o Urothelial Carcinoma of Renal Pelvis    RUL Non-small cell Lung cancer    Papillary thyroid cancer, diagnosed 2023    Endometrioid adenocarcinoma, FIGO grade 1 [facility], pT1a pN0, FIGO stage IA    Left ovary granulosa cell tumor diagnosed 6/12/2024, pT1a pN0 FIGO stage IA      Current Treatment: Active Surveillance        Neda Núñez is a 69 y.o. female who presents to [facility]EMATOLOGY [facility] ONCOL[facility]Y for follow up regarding H/o Urothelial Carcinoma of Renal Pelvis, RUL Non-small cell Lung cancer and most recently Papillary thyroid cancer, diagnosed 2023.    Urothelial Carcinoma of the Renal Pelvis:  - s/p left nephroureterectomy in Sept 2020  - pathology showed papillary urothelial carcinoma, invasive, high grade, invading into the renal parenchyma, margins negative, no lymph nodes submitted, pT3Nx  -currently under surveillance     RUL Lung Cancer:  - 2014 at an OSH with NSCLC  - treated with surgery and chemotherapy  -currently under surveillance    Papillary thyroid cancer  -s/p total thyroidectomy in 2/2024 by Dr. Pino at [facility]  -[id number]    Endometrioid adenocarcinoma, FIGO grade 1 [facility], pT1a pN0, FIGO stage IA    Left ovary granulosa cell tumor diagnosed 6/12/2024, pT1a pN0 FIGO stage IA    Pt without report of any fever, chills, night sweats, nausea, vomiting, diarrhea, lymphadenopathy, or unintentional weight loss.  She does report pain in her lower back. Pt here to discuss results of MRI Femur and NM bone scan.     Review of Systems   Constitutional: Negative.  Negative for appetite change, diaphoresis, fatigue, fever, unexpected weight gain and unexpected weight loss.   HENT: Negative.  Negative for hearing loss, mouth sores, sore throat, swollen glands, trouble swallowing  "and voice change.    Eyes: Negative.  Negative for blurred vision.   Respiratory: Negative.  Negative for cough, shortness of breath and wheezing.    Cardiovascular: Negative.  Negative for chest pain and palpitations.   Gastrointestinal: Negative.  Negative for abdominal pain, blood in stool, constipation, diarrhea, nausea and vomiting.   Endocrine: Positive for heat intolerance (hot flashes). Negative for cold intolerance.   Genitourinary: Negative.  Negative for difficulty urinating, dysuria, frequency, hematuria and urinary incontinence.   Musculoskeletal:  Positive for back pain. Negative for arthralgias and myalgias.   Skin:  Negative for rash and wound.        hairloss   Allergic/Immunologic: Negative.    Neurological: Negative.  Negative for dizziness, seizures, weakness and numbness.   Hematological: Negative.  Does not bruise/bleed easily.   Psychiatric/Behavioral: Negative.  Negative for depressed mood. The patient is not nervous/anxious.    All other systems reviewed and are negative.       Oncology/Hematology History Overview Note   Urothelial Carcinoma of the Renal Pelvis:  - s/p left nephroureterectomy in Sept 2020  - pathology showed papillary urothelial carcinoma, invasive, high grade, invading into the renal parenchyma, margins negative, no lymph nodes submitted, pT3Nx    RUL Lung Cancer:  - 2014 at an OSH with NSCLC  - treated with surgery and chemotherapy     Lung cancer   10/25/2021 Initial Diagnosis    Lung cancer (HCC)         Objective     Vitals:    12/05/24 0937   BP: 158/90   Pulse: 78   Resp: 18   Temp: 97.7 °F (36.5 °C)   TempSrc: Temporal   SpO2: 96%   Weight: 63.2 kg (139 lb 6.4 oz)   Height: 157.5 cm (62\")   PainSc:   8   PainLoc: Back             ECOG score: 0         PHQ-9 Total Score:         Physical Exam  Vitals reviewed. Exam conducted with a chaperone present.   Constitutional:       General: She is not in acute distress.     Appearance: Normal appearance.   HENT:      Head: " Normocephalic and atraumatic.   Eyes:      Extraocular Movements: Extraocular movements intact.      Conjunctiva/sclera: Conjunctivae normal.   Cardiovascular:      Comments: No nipple changes or palpable masses in either breast and no bilateral axillary LAD palpated on exam  Pulmonary:      Effort: Pulmonary effort is normal.   Musculoskeletal:      Cervical back: Normal range of motion and neck supple.   Skin:     General: Skin is warm and dry.      Findings: No bruising.   Neurological:      Mental Status: She is oriented to person, place, and time.           Past Medical History     Past Medical History:   Diagnosis Date    Allergies     Disease of thyroid gland     Ear problem     Emphysema lung     Endometrial cancer     Kidney stone     Lung cancer     Mass of vulva     Reflux esophagitis     Thyroid cancer     Ureteral mass     Urothelial cancer 12/03/2020    (Left)    Vocal cord dysfunction     Wax in ear      Current Outpatient Medications on File Prior to Visit   Medication Sig Dispense Refill    levothyroxine (SYNTHROID, LEVOTHROID) 75 MCG tablet Take 1 tablet by mouth Daily. MONDAY - SATURDAY . TO HOLD ON SUNDAY      fluticasone (FLONASE) 50 MCG/ACT nasal spray Administer 1 spray into the nostril(s) as directed by provider Daily. (Patient not taking: Reported on 12/5/2024)      montelukast (SINGULAIR) 10 MG tablet Take 1 tablet by mouth Every Night. (Patient not taking: Reported on 12/5/2024)      sodium-potassium-magnesium sulfates (Suprep Bowel Prep Kit) 17.5-3.13-1.6 GM/177ML solution oral solution Take 2 bottles by mouth Take As Directed. (Patient not taking: Reported on 12/5/2024) 177 mL 0     No current facility-administered medications on file prior to visit.      Allergies   Allergen Reactions    Penicillins Unknown - High Severity, Shortness Of Breath and Swelling     Tongue swelling    Throat swelling    Sulfa Antibiotics Unknown - High Severity and Hives     Itching/Rash    Percocet  [Oxycodone-Acetaminophen] Irritability     Past Surgical History:   Procedure Laterality Date     SECTION      CYSTOSCOPY RETROGRADE PYELOGRAM Bilateral 2023    Procedure: BILATERAL CYSTOSCOPY RETROGRADE PYELOGRAM;  Surgeon: Kadie Vu MD;  Location: [facility];  Service: Urology;  Laterality: Bilateral;    CYSTOSCOPY URETERAL BIOPSY      CYSTOSCOPY W/ URETERAL STENT PLACEMENT      CYSTOSCOPY, URETEROSCOPY, RETROGRADE PYELOGRAM, STENT INSERTION      HYSTERECTOMY  2024    KIDNEY SURGERY      LEG SURGERY      ignacio and pins     LUNG SURGERY Right     upper lobe removed    NEPHRECTOMY Left     THYROIDECTOMY      TRANSURETHRAL RESECTION OF BLADDER TUMOR N/A 2021    Procedure: CYSTOSCOPY TRANSURETHRAL RESECTION OF BLADDER TUMOR; CYSTOSCOPY RETROGRADE PYELOGRAM right;  Surgeon: Kadie Vu MD;  Location: [facility];  Service: Urology;  Laterality: N/A;    TRANSURETHRAL RESECTION OF BLADDER TUMOR N/A 2023    Procedure: CYSTOSCOPY TRANSURETHRAL RESECTION OF BLADDER TUMOR;  Surgeon: Kadie Vu MD;  Location: [facility] OR;  Service: Urology;  Laterality: N/A;    URETERECTOMY      at the same time Nephrectomy    URETEROSCOPY LASER LITHOTRIPSY WITH STENT INSERTION Right 2024    Procedure: CYSTOSCOPY, RIGHT URETEROSCOPY, STENT EXCAHNGE;  Surgeon: Kadie Vu MD;  Location: [facility];  Service: Urology;  Laterality: Right;     Social History     Socioeconomic History    Marital status:     Number of children: 2   Tobacco Use    Smoking status: Former     Current packs/day: 2.00     Types: Cigarettes     Passive exposure: Past    Smokeless tobacco: Never    Tobacco comments:     Age: [age], quit 12 years ago   Vaping Use    Vaping status: Never Used   Substance and Sexual Activity    Alcohol use: Not Currently     Comment: Light - Rarely as of 2020    Drug use: Never    Sexual activity: Defer     Family History   Problem Relation Age of  Onset    Diabetes Brother         Unspecified Type    Diabetes Maternal Grandfather         Unspecified Type    Breast cancer Paternal Grandmother     Cancer Other         Unspecified    Migraines Other         Migraine Headaches    Lung cancer Other     Malig Hyperthermia Neg Hx        Results     Result Review   The following data was reviewed by: Catrachito Sharpe MD on 03/21/2024:  Lab Results   Component Value Date    HGB 13.9 12/03/2024    HCT 43.5 12/03/2024    MCV 90.4 12/03/2024     12/03/2024    WBC 7.24 12/03/2024    NEUTROABS 4.42 12/03/2024    LYMPHSABS 2.12 12/03/2024    MONOSABS 0.50 12/03/2024    EOSABS 0.16 12/03/2024    BASOSABS 0.03 12/03/2024     Lab Results   Component Value Date    GLUCOSE 87 12/03/2024    BUN 11 12/03/2024    CREATININE 0.99 12/03/2024     12/03/2024    K 3.9 12/03/2024     12/03/2024    CO2 27.9 12/03/2024    CALCIUM 9.3 12/03/2024    PROTEINTOT 7.3 12/03/2024    ALBUMIN 4.4 12/03/2024    BILITOT 0.5 12/03/2024    ALKPHOS 57 12/03/2024    AST 16 12/03/2024    ALT 20 12/03/2024     Lab Results   Component Value Date    MG 2.0 06/21/2024    FREET4 1.17 12/03/2024    TSH 5.630 (H) 12/03/2024           No radiology results for the last day  NM Bone Scan Whole Body    Result Date: 11/29/2024  Impression: Total body bone scan demonstrating no abnormal uptake in the proximal right femur. Uptake in the axial and appendicular skeleton is consistent with degenerative change. Electronically Signed: Rafiq Matta MD  11/29/2024 11:29 AM EST  Workstation ID: [id number]    MRI Femur Right With & Without Contrast    Result Date: 11/27/2024  Impression: Impression: 1.Findings most consistent with a marrow replacing lesion involving the proximal diaphysis of the right femur measuring 2.0 cm. This lesion corresponds to the focus of abnormal radiopharmaceutical accumulation on the prior PET/CT and is likely related to  a metastatic focus. Multiple myeloma or primary bone lymphoma  are felt less likely. 2.No additional lesions are identified. Electronically Signed: Sam Wang MD  11/27/2024 3:15 PM EST  Workstation ID: [id number]     Assessment & Plan     Diagnoses and all orders for this visit:    1. Abnormal MRI scan, bone (Primary)  -     CT Needle Biopsy Bone Superficial; Future  -     Tissue Pathology Exam; Standing    2. Abnormal positron emission tomography (PET) scan  -     Immunofixation (JAJA), Protein Electrophoresis (PE), and Quantitative Free Kappa and Lambda Light Chains (FLC), Serum; Future    3. Malignant neoplasm of upper lobe of right lung  -     CBC & Differential; Future  -     Comprehensive Metabolic Panel; Future                Neda Núñez is a 69 y.o. female who presents to [facility] HEMATOLOGY [facility] ONCOLOGY for follow up regarding H/o Urothelial Carcinoma of Renal Pelvis, RUL Non-small cell Lung cancer and most recently Papillary thyroid cancer, diagnosed 2023.    All are currently under active surveillance.  -Pt on levothyroxine which was prescribed by Dr. Pino at [facility]  -attempting to get clinic records for history from that office to confirm history    Urothelial Carcinoma of the Renal Pelvis:  - s/p left nephroureterectomy in Sept 2020  - pathology showed papillary urothelial carcinoma, invasive, high grade, invading into the renal parenchyma, margins negative, no lymph nodes submitted, pT3Nx  -currently under surveillance with Dr. Horace SNYDER Lung Cancer:  - 2014 at an OSH with NSCLC  - treated with surgery and chemotherapy  -currently under surveillance    Papillary thyroid cancer  -s/p total thyroidectomy in 2/2024 by Dr. Pino at [facility]  -[id number]    Endometrioid adenocarcinoma, FIGO grade 1 diagnosed 6/12/2024 via [facility], pT1a pN0, FIGO stage IA, Grade 1 is observation is preferred,       Left ovary granulosa cell tumor diagnosed 6/12/2024, pT1a pN0 FIGO stage IA  -reviewed NCCN guidelines that for stage I sex cord-stromal  tumors  --per NCCN guidelines, recommend scans with symptoms, elevated biomarkers, but typically every 6 months for years 0-2    Recommend pt follow up with gyn/onc physician Dr. Nieto    -Mammogram from 8/27/2024 was BI-RADS 1 negative recommend repeating in 1 year    -Given patient's back pain, ordered PET/CT scan to assess for malignancy, which occurred on 10/18/2024 which showed focal area of metabolic activity right proximal femur, mild metabolic activity in distal thoracic esophagus possible inflammatory, esophagram or direct visualization was recommended, decrease in size of fluid collection since her surgery, otherwise no other evidence of disease recurrence, pt reports recent pelvic exam by gyn/onc was unremarkable, recommend she continue with pelvic exams with gyn/onc  -Thus, referred patient to GI for consideration for EGD,  mild metabolic activity in distal thoracic esophagus   -ordered MRI of right femur in 11/2024 which revealed findings most consistent with a marrow replacing lesion involving the proximal diaphysis of the right femur measuring 2.0 cm. This lesion corresponds to the focus of abnormal radiopharmaceutical accumulation on the prior PET/CT and is likely related to   a metastatic focus. Referred pt to have CT guided biopsy of the lesion by IR, given pt history of multiple cancers, will have pt follow up after right femur biopsy    -Plan for pt follow up in 6 weeks with right femur biopsy and labs CBC, CMP, FLC, SPEP, Immunofixation.    Please note that portions of this note were completed with a voice recognition program.    Electronically signed by Catrachito Sharpe MD, 12/05/24, 4:38 PM EST.        Follow Up     I spent 30 minutes caring for Neda on this date of service. This time includes time spent by me in the following activities:preparing for the visit, reviewing tests, obtaining and/or reviewing a separately obtained history, performing a medically appropriate examination and/or  evaluation , counseling and educating the patient/family/caregiver, ordering medications, tests, or procedures, referring and communicating with other health care professionals , documenting information in the medical record, independently interpreting results and communicating that information with the patient/family/caregiver, and care coordination.    This is an acute or chronic illness that poses a threat to life or bodily function. The above treatment plan involves a high risk of complications and/or mortality of patient management.    The patient was seen and examined. Work by the provider also included review and/or ordering of lab tests, review and/or ordering of radiology tests, review and/or ordering of medicine tests, discussion with other physicians or providers, independent review of data, obtaining old records, review/summation of old records, and/or other review.    I have reviewed the family history, social history, and past medical history for this patient. Previous information and data has been reviewed and updated as needed. I have reviewed and verified the chief complaint, history, and other documentation. The patient was interviewed and examined in the clinic and the chart reviewed. The previous observations, recommendations, and conclusions were reviewed including those of other providers.     The plan was discussed with the patient and/or family. The patient was given time to ask questions and these questions were answered. At the conclusion of their visit they had no additional questions or concerns and all questions were answered to their satisfaction.    Patient was given instructions and counseling regarding her condition or for health maintenance advice. Please see specific information pulled into the AVS if appropriate.

## 2024-12-10 ENCOUNTER — ANESTHESIA EVENT (OUTPATIENT)
Dept: GASTROENTEROLOGY | Facility: HOSPITAL | Age: 69
End: 2024-12-10
Payer: MEDICARE

## 2024-12-10 NOTE — ANESTHESIA PREPROCEDURE EVALUATION
Anesthesia Evaluation     Patient summary reviewed and Nursing notes reviewed   NPO Solid Status: > 8 hours  NPO Liquid Status: > 4 hours           Airway   Mallampati: I  TM distance: >3 FB  Neck ROM: limited  No difficulty expected  Comment: Limited ROM to left lateral d/t arthritis /DDD  Dental    (+) partials and lower dentures    Pulmonary     breath sounds clear to auscultation  (+) lung cancer (s/p RUL lobectomy), COPD,  Cardiovascular   Exercise tolerance: good (4-7 METS)    ECG reviewed  Rhythm: regular  Rate: normal    (+) hyperlipidemia      Neuro/Psych  GI/Hepatic/Renal/Endo    (+) GERD poorly controlled, renal disease- stones, thyroid problem thyroid nodules    Musculoskeletal     Abdominal    Substance History      OB/GYN          Other      history of cancer (History of lung, endometrial, thyroid cancers) remission    ROS/Med Hx Other: GERD    History of vocal cord dysfunction- intubated 8/23 without issues,had LMA placed on 7/11/24 without issue    OTHERWISE NORMAL ECG -  Sinus rhythm  RSR' in V1 or V2, right VCD or RVH  When compared with ECG of 02-Jun-2023 14:00:41,  No significant change  Electronically Signed By: Jose Millre ([facility]) 23-Jan-2024 07:53:37  Date and Time of Study: 2024-01-17 15:01:47                  Anesthesia Plan    ASA 3     general   total IV anesthesia  (Patient understands anesthesia not responsible for dental damage. Risks explained including allergic reactions, BP, HR, O2 changes, aspiration, advanced airway placement. Pt verbalized understanding.      Discussed risks with pt including aspiration, allergic reactions, apnea, advanced airway placement. Pt verbalized understanding. All questions answered.   )  intravenous induction     Anesthetic plan, risks, benefits, and alternatives have been provided, discussed and informed consent has been obtained with: patient.  Pre-procedure education provided  Plan discussed with CRNA.      CODE STATUS:

## 2024-12-11 ENCOUNTER — ANESTHESIA (OUTPATIENT)
Dept: GASTROENTEROLOGY | Facility: HOSPITAL | Age: 69
End: 2024-12-11
Payer: MEDICARE

## 2024-12-11 ENCOUNTER — HOSPITAL ENCOUNTER (OUTPATIENT)
Facility: HOSPITAL | Age: 69
Setting detail: HOSPITAL OUTPATIENT SURGERY
Discharge: HOME | End: 2024-12-11
Attending: INTERNAL MEDICINE | Admitting: INTERNAL MEDICINE
Payer: MEDICARE

## 2024-12-11 VITALS
HEIGHT: 62 IN | TEMPERATURE: 97.7 F | WEIGHT: 134.92 LBS | HEART RATE: 66 BPM | SYSTOLIC BLOOD PRESSURE: 107 MMHG | RESPIRATION RATE: 15 BRPM | BODY MASS INDEX: 24.83 KG/M2 | OXYGEN SATURATION: 98 % | DIASTOLIC BLOOD PRESSURE: 61 MMHG

## 2024-12-11 DIAGNOSIS — Z85.43 HISTORY OF OVARIAN CANCER: ICD-10-CM

## 2024-12-11 DIAGNOSIS — R93.3 ABNORMAL FINDING ON GI TRACT IMAGING: ICD-10-CM

## 2024-12-11 DIAGNOSIS — Z85.118 HISTORY OF LUNG CANCER: ICD-10-CM

## 2024-12-11 DIAGNOSIS — K21.9 GASTROESOPHAGEAL REFLUX DISEASE, UNSPECIFIED WHETHER ESOPHAGITIS PRESENT: ICD-10-CM

## 2024-12-11 DIAGNOSIS — Z85.528 HISTORY OF KIDNEY CANCER: ICD-10-CM

## 2024-12-11 PROCEDURE — 25010000002 PROPOFOL 10 MG/ML EMULSION: Performed by: NURSE ANESTHETIST, CERTIFIED REGISTERED

## 2024-12-11 PROCEDURE — 88305 TISSUE EXAM BY PATHOLOGIST: CPT | Performed by: INTERNAL MEDICINE

## 2024-12-11 PROCEDURE — 25010000002 LIDOCAINE PF 2% 2 % SOLUTION: Performed by: NURSE ANESTHETIST, CERTIFIED REGISTERED

## 2024-12-11 PROCEDURE — 45380 COLONOSCOPY AND BIOPSY: CPT | Performed by: INTERNAL MEDICINE

## 2024-12-11 PROCEDURE — 43239 EGD BIOPSY SINGLE/MULTIPLE: CPT | Performed by: INTERNAL MEDICINE

## 2024-12-11 RX ORDER — PROPOFOL 10 MG/ML
VIAL (ML) INTRAVENOUS AS NEEDED
Status: DISCONTINUED | OUTPATIENT
Start: 2024-12-11 | End: 2024-12-11 | Stop reason: SURG

## 2024-12-11 RX ORDER — LIDOCAINE HYDROCHLORIDE 20 MG/ML
INJECTION, SOLUTION EPIDURAL; INFILTRATION; INTRACAUDAL; PERINEURAL AS NEEDED
Status: DISCONTINUED | OUTPATIENT
Start: 2024-12-11 | End: 2024-12-11 | Stop reason: SURG

## 2024-12-11 RX ORDER — SODIUM CHLORIDE, SODIUM LACTATE, POTASSIUM CHLORIDE, CALCIUM CHLORIDE 600; 310; 30; 20 MG/100ML; MG/100ML; MG/100ML; MG/100ML
30 INJECTION, SOLUTION INTRAVENOUS CONTINUOUS
Status: DISCONTINUED | OUTPATIENT
Start: 2024-12-11 | End: 2024-12-11 | Stop reason: HOSPADM

## 2024-12-11 RX ADMIN — PROPOFOL 225 MCG/KG/MIN: 10 INJECTION, EMULSION INTRAVENOUS at 12:00

## 2024-12-11 RX ADMIN — PROPOFOL 100 MG: 10 INJECTION, EMULSION INTRAVENOUS at 12:00

## 2024-12-11 RX ADMIN — LIDOCAINE HYDROCHLORIDE 100 MG: 20 INJECTION, SOLUTION EPIDURAL; INFILTRATION; INTRACAUDAL; PERINEURAL at 12:00

## 2024-12-11 NOTE — H&P
Pre Procedure History & Physical    Chief Complaint:   GERD, abnormal CT, lower abd cramping, bloating    Subjective     HPI:   68 yo F here for eval of GERD, abnormal CT, lower abd cramping, bloating.  Pt reports occasional lower abd cramping, bloating.    Past Medical History:   Past Medical History:   Diagnosis Date    Allergies     Disease of thyroid gland     Ear problem     Emphysema lung     Endometrial cancer     Kidney stone     Lung cancer     Mass of vulva     Reflux esophagitis     Thyroid cancer     Ureteral mass     Urothelial cancer 2020    (Left)    Vocal cord dysfunction     Wax in ear        Past Surgical History:  Past Surgical History:   Procedure Laterality Date     SECTION      CYSTOSCOPY RETROGRADE PYELOGRAM Bilateral 2023    Procedure: BILATERAL CYSTOSCOPY RETROGRADE PYELOGRAM;  Surgeon: Kadie Vu MD;  Location: [facility] OR;  Service: Urology;  Laterality: Bilateral;    CYSTOSCOPY URETERAL BIOPSY      CYSTOSCOPY W/ URETERAL STENT PLACEMENT      CYSTOSCOPY, URETEROSCOPY, RETROGRADE PYELOGRAM, STENT INSERTION      HYSTERECTOMY  2024    KIDNEY SURGERY      LEG SURGERY      ignacio and pins     LUNG SURGERY Right     upper lobe removed    NEPHRECTOMY Left     THYROIDECTOMY      TRANSURETHRAL RESECTION OF BLADDER TUMOR N/A 2021    Procedure: CYSTOSCOPY TRANSURETHRAL RESECTION OF BLADDER TUMOR; CYSTOSCOPY RETROGRADE PYELOGRAM right;  Surgeon: Kadie Vu MD;  Location: [facility] OR;  Service: Urology;  Laterality: N/A;    TRANSURETHRAL RESECTION OF BLADDER TUMOR N/A 2023    Procedure: CYSTOSCOPY TRANSURETHRAL RESECTION OF BLADDER TUMOR;  Surgeon: Kadie Vu MD;  Location: [facility];  Service: Urology;  Laterality: N/A;    URETERECTOMY      at the same time Nephrectomy    URETEROSCOPY LASER LITHOTRIPSY WITH STENT INSERTION Right 2024    Procedure: CYSTOSCOPY, RIGHT URETEROSCOPY, STENT EXCAHNGE;  Surgeon: Kadie Vu MD;   "Location: [facility] OR;  Service: Urology;  Laterality: Right;       Family History:  Family History   Problem Relation Age of Onset    Diabetes Brother         Unspecified Type    Diabetes Maternal Grandfather         Unspecified Type    Breast cancer Paternal Grandmother     Cancer Other         Unspecified    Migraines Other         Migraine Headaches    Lung cancer Other     Malig Hyperthermia Neg Hx        Social History:   reports that she has quit smoking. Her smoking use included cigarettes. She has been exposed to tobacco smoke. She has never used smokeless tobacco. She reports that she does not currently use alcohol. She reports that she does not use drugs.    Medications:   Medications Prior to Admission   Medication Sig Dispense Refill Last Dose/Taking    levothyroxine (SYNTHROID, LEVOTHROID) 75 MCG tablet Take 1 tablet by mouth Daily. MONDAY - SATURDAY . TO HOLD ON SUNDAY 12/11/2024    fluticasone (FLONASE) 50 MCG/ACT nasal spray Administer 1 spray into the nostril(s) as directed by provider Daily. (Patient not taking: Reported on 12/5/2024)       montelukast (SINGULAIR) 10 MG tablet Take 1 tablet by mouth Every Night. (Patient not taking: Reported on 12/5/2024)          Allergies:  Penicillins, Sulfa antibiotics, and Percocet [oxycodone-acetaminophen]    ROS:    Pertinent items are noted in HPI     Objective     Blood pressure 154/76, pulse 70, temperature 97.6 °F (36.4 °C), temperature source Temporal, resp. rate 20, height 157.5 cm (62\"), weight 61.2 kg (134 lb 14.7 oz), SpO2 100%.    Physical Exam   Constitutional: Pt is oriented to person, place, and time and well-developed, well-nourished, and in no distress.   Mouth/Throat: Oropharynx is clear and moist.   Neck: Normal range of motion.   Cardiovascular: Normal rate, regular rhythm and normal heart sounds.    Pulmonary/Chest: Effort normal and breath sounds normal.   Abdominal: Soft. Nontender  Skin: Skin is warm and dry.   Psychiatric: Mood, " memory, affect and judgment normal.     Assessment & Plan     Diagnosis:  GERD, abnormal CT, lower abd cramping, bloating    Anticipated Surgical Procedure:  EGD/colonoscopy    The risks, benefits, and alternatives of this procedure have been discussed with the patient or the responsible party- the patient understands and agrees to proceed.

## 2024-12-11 NOTE — ANESTHESIA POSTPROCEDURE EVALUATION
Patient: Neda Núñez    Procedure Summary       Date: 12/11/24 Room / Location: [facility] ENDOSCOPY 4 / [facility] ENDOSCOPY    Anesthesia Start: 1158 Anesthesia Stop: 1236    Procedures:       ESOPHAGOGASTRODUODENOSCOPY WITH BIOPSIES      COLONOSCOPY WITH POLYPECTOMY Diagnosis:       Abnormal finding on GI tract imaging      Gastroesophageal reflux disease, unspecified whether esophagitis present      History of lung cancer      History of kidney cancer      History of ovarian cancer      (Abnormal finding on GI tract imaging [R93.3])      (Gastroesophageal reflux disease, unspecified whether esophagitis present [K21.9])      (History of lung cancer [Z85.118])      (History of kidney cancer [Z85.528])      (History of ovarian cancer [Z85.43])    Surgeons: Ya Rodriguez MD Provider: Devante Sher CRNA    Anesthesia Type: general ASA Status: 3            Anesthesia Type: general    Vitals  Vitals Value Taken Time   /61 12/11/24 1253   Temp 36.5 °C (97.7 °F) 12/11/24 1253   Pulse 63 12/11/24 1255   Resp 15 12/11/24 1253   SpO2 98 % 12/11/24 1255   Vitals shown include unfiled device data.        Post Anesthesia Care and Evaluation    Post-procedure mental status: acceptable.  Pain management: satisfactory to patient    Airway patency: patent  Anesthetic complications: No anesthetic complications    Cardiovascular status: acceptable  Respiratory status: acceptable    Comments: Per chart review

## 2024-12-13 ENCOUNTER — HOSPITAL ENCOUNTER (OUTPATIENT)
Dept: CT IMAGING | Facility: HOSPITAL | Age: 69
Discharge: HOME | End: 2024-12-13
Payer: MEDICARE

## 2024-12-13 VITALS
SYSTOLIC BLOOD PRESSURE: 175 MMHG | OXYGEN SATURATION: 99 % | RESPIRATION RATE: 14 BRPM | HEART RATE: 64 BPM | DIASTOLIC BLOOD PRESSURE: 89 MMHG

## 2024-12-13 DIAGNOSIS — R93.7 ABNORMAL MRI SCAN, BONE: ICD-10-CM

## 2024-12-13 DIAGNOSIS — R93.7 ABNORMAL MRI SCAN, BONE: Primary | ICD-10-CM

## 2024-12-13 LAB
APTT PPP: 28.9 SECONDS (ref 24.2–34.2)
CYTO UR: NORMAL
INR PPP: 0.94 (ref 0.86–1.15)
LAB AP CASE REPORT: NORMAL
LAB AP CLINICAL INFORMATION: NORMAL
PATH REPORT.FINAL DX SPEC: NORMAL
PATH REPORT.GROSS SPEC: NORMAL
PROTHROMBIN TIME: 12.8 SECONDS (ref 11.8–14.9)

## 2024-12-13 PROCEDURE — 85610 PROTHROMBIN TIME: CPT | Performed by: RADIOLOGY

## 2024-12-13 PROCEDURE — 85730 THROMBOPLASTIN TIME PARTIAL: CPT | Performed by: RADIOLOGY

## 2024-12-13 RX ORDER — LIDOCAINE HYDROCHLORIDE 20 MG/ML
INJECTION, SOLUTION INFILTRATION; PERINEURAL
Status: DISPENSED
Start: 2024-12-13 | End: 2024-12-13

## 2024-12-13 NOTE — H&P
[facility]   Interventional Radiology H&P    Patient Name: Neda Núñez  : 1955  MRN: [id number]  Primary Care Physician:  Renetta Talbert MD  Referring Physician: Catrachito Sharpe MD  Date of admission: 2024    Subjective   Subjective     HPI:  Neda Núñez is a 69 y.o. female with proximal femur lesion    Review of Systems:   Constitutional no fever,  no weight loss       Otolaryngeal no difficulty swallowing   Cardiovascular no chest pain   Pulmonary no cough, no sputum production   Gastrointestinal no constipation, no diarrhea                         Personal History       Past Medical/Surgical History:   Past Medical History:   Diagnosis Date    Allergies     Disease of thyroid gland     Ear problem     Emphysema lung     Endometrial cancer     Kidney stone     Lung cancer     Mass of vulva     Reflux esophagitis     Thyroid cancer     Ureteral mass     Urothelial cancer 2020    (Left)    Vocal cord dysfunction     Wax in ear      Past Surgical History:   Procedure Laterality Date     SECTION      COLONOSCOPY N/A 2024    Procedure: COLONOSCOPY WITH POLYPECTOMY;  Surgeon: Ya Rodriguez MD;  Location: [facility] ENDOSCOPY;  Service: Gastroenterology;  Laterality: N/A;  COLON POLYP    CYSTOSCOPY RETROGRADE PYELOGRAM Bilateral 2023    Procedure: BILATERAL CYSTOSCOPY RETROGRADE PYELOGRAM;  Surgeon: Kadie Vu MD;  Location: [facility] MAIN OR;  Service: Urology;  Laterality: Bilateral;    CYSTOSCOPY URETERAL BIOPSY      CYSTOSCOPY W/ URETERAL STENT PLACEMENT      CYSTOSCOPY, URETEROSCOPY, RETROGRADE PYELOGRAM, STENT INSERTION      ENDOSCOPY N/A 2024    Procedure: ESOPHAGOGASTRODUODENOSCOPY WITH BIOPSIES;  Surgeon: Ya Rodriguez MD;  Location: [facility] ENDOSCOPY;  Service: Gastroenterology;  Laterality: N/A;  ESOPHAGITIS, HIATAL HERNIA, DUODENAL NODULE    HYSTERECTOMY  2024    KIDNEY SURGERY      LEG SURGERY      ignacio and pins     LUNG  SURGERY Right     upper lobe removed    NEPHRECTOMY Left     THYROIDECTOMY      TRANSURETHRAL RESECTION OF BLADDER TUMOR N/A 11/02/2021    Procedure: CYSTOSCOPY TRANSURETHRAL RESECTION OF BLADDER TUMOR; CYSTOSCOPY RETROGRADE PYELOGRAM right;  Surgeon: Kadie Vu MD;  Location: [facility];  Service: Urology;  Laterality: N/A;    TRANSURETHRAL RESECTION OF BLADDER TUMOR N/A 08/01/2023    Procedure: CYSTOSCOPY TRANSURETHRAL RESECTION OF BLADDER TUMOR;  Surgeon: Kadie Vu MD;  Location: [facility] OR;  Service: Urology;  Laterality: N/A;    URETERECTOMY      at the same time Nephrectomy    URETEROSCOPY LASER LITHOTRIPSY WITH STENT INSERTION Right 7/11/2024    Procedure: CYSTOSCOPY, RIGHT URETEROSCOPY, STENT EXCAHNGE;  Surgeon: Kadie Vu MD;  Location: [facility];  Service: Urology;  Laterality: Right;       Social History:  reports that she has quit smoking. Her smoking use included cigarettes. She has been exposed to tobacco smoke. She has never used smokeless tobacco. She reports that she does not currently use alcohol. She reports that she does not use drugs.    Medications:  (Not in a hospital admission)    Current medications:    Current IV drips:  No current facility-administered medications for this encounter.      Allergies:  Allergies   Allergen Reactions    Penicillins Unknown - High Severity, Shortness Of Breath and Swelling     Tongue swelling    Throat swelling    Sulfa Antibiotics Unknown - High Severity and Hives     Itching/Rash    Percocet [Oxycodone-Acetaminophen] Irritability       Objective    Objective     Vitals:   Heart Rate:  [64] 64  Resp:  [14] 14  BP: (175)/(89) 175/89      Physical Exam:   Constitutional: Awake, alert, No acute distress    Respiratory: Clear to auscultation bilaterally, nonlabored respirations    Cardiovascular: RRR, no murmurs, rubs, or gallops, palpable pedal pulses bilaterally   Gastrointestinal: Positive bowel sounds, soft, nontender,  "nondistended        ASA SCALE ASSESSMENT:  3-Severe systemic disease that results in functional limitation    MALLAMPATI CLASSIFICATION:  3-Only the soft palate and base of uvula are visible       Result Review        Result Review:     No results found for: \"NA\"    No results found for: \"K\"    No results found for: \"CL\"    No results found for: \"PLASMABICARB\"    No results found for: \"BUN\"    No results found for: \"CREATININE\"    No results found for: \"CALCIUM\"        No components found for: \"GLUCOSE.*\"       Results from last 7 days   Lab Units 12/13/24  0835   INR  0.94           Assessment / Plan     Assesment:   Right proximal femur lesion      Plan:   Ct guided biopsy    The risks and benefits of the procedure were discussed with the patient.    Electronically signed by Rustam Orona MD, 12/13/24, 9:39 AM EST.   "

## 2024-12-16 ENCOUNTER — TELEPHONE (OUTPATIENT)
Dept: GASTROENTEROLOGY | Facility: CLINIC | Age: 69
End: 2024-12-16
Payer: MEDICARE

## 2024-12-16 RX ORDER — PANTOPRAZOLE SODIUM 20 MG/1
20 TABLET, DELAYED RELEASE ORAL DAILY
Qty: 30 TABLET | Refills: 3 | Status: SHIPPED | OUTPATIENT
Start: 2024-12-16

## 2024-12-16 NOTE — TELEPHONE ENCOUNTER
----- Message from Valerie Luque sent at 12/16/2024  3:42 PM EST -----  Colon biopsy benign.  Repeat colonoscopy in 5 years.  Please send letter to patient and PCP.  EGD did show peptic duodenitis and gastritis.  Reflux esophagitis.  Will start Protonix 20 mg daily.  Avoid NSAIDs.  Patient to follow-up with me in 6 to 8 weeks.

## 2024-12-19 ENCOUNTER — HOSPITAL ENCOUNTER (OUTPATIENT)
Dept: CT IMAGING | Facility: HOSPITAL | Age: 69
Discharge: HOME | End: 2024-12-19
Payer: MEDICARE

## 2024-12-19 ENCOUNTER — TELEPHONE (OUTPATIENT)
Dept: ONCOLOGY | Facility: HOSPITAL | Age: 69
End: 2024-12-19
Payer: MEDICARE

## 2024-12-19 VITALS
SYSTOLIC BLOOD PRESSURE: 152 MMHG | HEART RATE: 68 BPM | OXYGEN SATURATION: 98 % | RESPIRATION RATE: 12 BRPM | DIASTOLIC BLOOD PRESSURE: 98 MMHG

## 2024-12-19 DIAGNOSIS — R52 PAIN: Primary | ICD-10-CM

## 2024-12-19 DIAGNOSIS — R93.7 ABNORMAL MRI SCAN, BONE: ICD-10-CM

## 2024-12-19 PROCEDURE — 25010000002 MIDAZOLAM PER 1MG: Performed by: RADIOLOGY

## 2024-12-19 PROCEDURE — 25010000002 FENTANYL CITRATE (PF) 50 MCG/ML SOLUTION: Performed by: RADIOLOGY

## 2024-12-19 PROCEDURE — 77012 CT SCAN FOR NEEDLE BIOPSY: CPT

## 2024-12-19 RX ORDER — FENTANYL CITRATE 50 UG/ML
50 INJECTION, SOLUTION INTRAMUSCULAR; INTRAVENOUS ONCE
Status: COMPLETED | OUTPATIENT
Start: 2024-12-19 | End: 2024-12-19

## 2024-12-19 RX ORDER — MIDAZOLAM HYDROCHLORIDE 2 MG/2ML
INJECTION, SOLUTION INTRAMUSCULAR; INTRAVENOUS AS NEEDED
Status: COMPLETED | OUTPATIENT
Start: 2024-12-19 | End: 2024-12-19

## 2024-12-19 RX ORDER — FENTANYL CITRATE 50 UG/ML
INJECTION, SOLUTION INTRAMUSCULAR; INTRAVENOUS AS NEEDED
Status: COMPLETED | OUTPATIENT
Start: 2024-12-19 | End: 2024-12-19

## 2024-12-19 RX ORDER — LIDOCAINE HYDROCHLORIDE 20 MG/ML
INJECTION, SOLUTION INFILTRATION; PERINEURAL
Status: DISPENSED
Start: 2024-12-19 | End: 2024-12-19

## 2024-12-19 RX ORDER — HYDROCODONE BITARTRATE AND ACETAMINOPHEN 5; 325 MG/1; MG/1
1 TABLET ORAL EVERY 4 HOURS PRN
Qty: 42 TABLET | Refills: 0 | Status: SHIPPED | OUTPATIENT
Start: 2024-12-19 | End: 2024-12-26

## 2024-12-19 RX ORDER — FENTANYL CITRATE 50 UG/ML
25 INJECTION, SOLUTION INTRAMUSCULAR; INTRAVENOUS ONCE
Status: DISCONTINUED | OUTPATIENT
Start: 2024-12-19 | End: 2024-12-19

## 2024-12-19 RX ADMIN — FENTANYL CITRATE 25 MCG: 50 INJECTION, SOLUTION INTRAMUSCULAR; INTRAVENOUS at 09:59

## 2024-12-19 RX ADMIN — FENTANYL CITRATE 50 MCG: 50 INJECTION, SOLUTION INTRAMUSCULAR; INTRAVENOUS at 11:50

## 2024-12-19 RX ADMIN — MIDAZOLAM HYDROCHLORIDE 0.5 MG: 1 INJECTION, SOLUTION INTRAMUSCULAR; INTRAVENOUS at 10:05

## 2024-12-19 RX ADMIN — FENTANYL CITRATE 50 MCG: 50 INJECTION, SOLUTION INTRAMUSCULAR; INTRAVENOUS at 09:35

## 2024-12-19 RX ADMIN — MIDAZOLAM HYDROCHLORIDE 1 MG: 1 INJECTION, SOLUTION INTRAMUSCULAR; INTRAVENOUS at 09:32

## 2024-12-19 RX ADMIN — MIDAZOLAM HYDROCHLORIDE 0.5 MG: 1 INJECTION, SOLUTION INTRAMUSCULAR; INTRAVENOUS at 10:09

## 2024-12-19 NOTE — TELEPHONE ENCOUNTER
Caller: KEYA    Relationship: RADIOLOGY     Best call back number: [phone] BROOKS     What is the best time to reach you: ANY    Who are you requesting to speak with (clinical staff, provider,  specific staff member): CLINICAL     What was the call regarding: BROOKS HAD HER BONE BIOPSY AND SHE IS IN THE HOLDING AREA    PATIENT COMPLAINING OF PAIN AT THE INJECTION SITE     CAROLINE VAZQUEZ IS WANTING TO SEE IF DR DENNIS CAN CALL IN SOMETHING FOR PAIN    BROOKS IS ALLERGIC TO PERCOCET, AND TYLENOL DOES NOT HELP AND SHE CAN NOT HAVE ANY NSAIDS BECAUSE SHE ONLY HAS ONE KIDNEY    PLEASE ADVISE      DINORA

## 2024-12-19 NOTE — H&P
[facility]   Interventional Radiology H&P    Patient Name: Neda Núñez  : 1955  MRN: [id number]  Primary Care Physician:  Renetta Talbert MD  Referring Physician: Catrachito Sharpe MD  Date of admission: 2024    Subjective   Subjective     HPI:  Neda Núñez is a 69 y.o. female Proximal right femur lesion on PET CT    Review of Systems:   Constitutional no fever,  no weight loss       Otolaryngeal no difficulty swallowing   Cardiovascular no chest pain   Pulmonary no cough, no sputum production   Gastrointestinal no constipation, no diarrhea                         Personal History       Past Medical/Surgical History:   Past Medical History:   Diagnosis Date    Allergies     Disease of thyroid gland     Ear problem     Emphysema lung     Endometrial cancer     Kidney stone     Lung cancer     Mass of vulva     Reflux esophagitis     Thyroid cancer     Ureteral mass     Urothelial cancer 2020    (Left)    Vocal cord dysfunction     Wax in ear      Past Surgical History:   Procedure Laterality Date     SECTION      COLONOSCOPY N/A 2024    Procedure: COLONOSCOPY WITH POLYPECTOMY;  Surgeon: Ya Rodriguez MD;  Location: [facility] ENDOSCOPY;  Service: Gastroenterology;  Laterality: N/A;  COLON POLYP    CYSTOSCOPY RETROGRADE PYELOGRAM Bilateral 2023    Procedure: BILATERAL CYSTOSCOPY RETROGRADE PYELOGRAM;  Surgeon: Kadie Vu MD;  Location: [facility] MAIN OR;  Service: Urology;  Laterality: Bilateral;    CYSTOSCOPY URETERAL BIOPSY      CYSTOSCOPY W/ URETERAL STENT PLACEMENT      CYSTOSCOPY, URETEROSCOPY, RETROGRADE PYELOGRAM, STENT INSERTION      ENDOSCOPY N/A 2024    Procedure: ESOPHAGOGASTRODUODENOSCOPY WITH BIOPSIES;  Surgeon: Ya Rodriguez MD;  Location: [facility] ENDOSCOPY;  Service: Gastroenterology;  Laterality: N/A;  ESOPHAGITIS, HIATAL HERNIA, DUODENAL NODULE    HYSTERECTOMY  2024    KIDNEY SURGERY      LEG SURGERY      ignacio and pins      LUNG SURGERY Right     upper lobe removed    NEPHRECTOMY Left     THYROIDECTOMY      TRANSURETHRAL RESECTION OF BLADDER TUMOR N/A 11/02/2021    Procedure: CYSTOSCOPY TRANSURETHRAL RESECTION OF BLADDER TUMOR; CYSTOSCOPY RETROGRADE PYELOGRAM right;  Surgeon: Kadie Vu MD;  Location: [facility];  Service: Urology;  Laterality: N/A;    TRANSURETHRAL RESECTION OF BLADDER TUMOR N/A 08/01/2023    Procedure: CYSTOSCOPY TRANSURETHRAL RESECTION OF BLADDER TUMOR;  Surgeon: Kadie Vu MD;  Location: [facility] OR;  Service: Urology;  Laterality: N/A;    URETERECTOMY      at the same time Nephrectomy    URETEROSCOPY LASER LITHOTRIPSY WITH STENT INSERTION Right 7/11/2024    Procedure: CYSTOSCOPY, RIGHT URETEROSCOPY, STENT EXCAHNGE;  Surgeon: Kadie Vu MD;  Location: [facility];  Service: Urology;  Laterality: Right;       Social History:  reports that she has quit smoking. Her smoking use included cigarettes. She has been exposed to tobacco smoke. She has never used smokeless tobacco. She reports that she does not currently use alcohol. She reports that she does not use drugs.    Medications:  (Not in a hospital admission)    Current medications:    Current IV drips:  No current facility-administered medications for this encounter.      Allergies:  Allergies   Allergen Reactions    Penicillins Unknown - High Severity, Shortness Of Breath and Swelling     Tongue swelling    Throat swelling    Sulfa Antibiotics Unknown - High Severity and Hives     Itching/Rash    Percocet [Oxycodone-Acetaminophen] Irritability       Objective    Objective     Vitals:   Heart Rate:  [70] 70  Resp:  [16] 16  BP: (180)/(89) 180/89      Physical Exam:   Constitutional: Awake, alert, No acute distress    Respiratory: Clear to auscultation bilaterally, nonlabored respirations    Cardiovascular: RRR, no murmurs, rubs, or gallops, palpable pedal pulses bilaterally   Gastrointestinal: Positive bowel sounds, soft,  "nontender, nondistended        ASA SCALE ASSESSMENT:  2-Mild to moderate systemic disease, medically well controlled, with no functional limitation    MALLAMPATI CLASSIFICATION:  1-Able to visualize the soft palate, fauces, uvula, anterior & posterior tonsilar pillars.       Result Review        Result Review:     No results found for: \"NA\"    No results found for: \"K\"    No results found for: \"CL\"    No results found for: \"PLASMABICARB\"    No results found for: \"BUN\"    No results found for: \"CREATININE\"    No results found for: \"CALCIUM\"        No components found for: \"GLUCOSE.*\"       Results from last 7 days   Lab Units 12/13/24  0835   INR  0.94           Assessment / Plan     Assesment:   Right femur lesion      Plan:   CT guided biopsy of right femur lesion    The risks and benefits of the procedure were discussed with the patient.    Electronically signed by Niranjan Andino MD, 12/19/24, 9:12 AM EST.    "

## 2024-12-19 NOTE — TELEPHONE ENCOUNTER
Called Neda back to see if she has tolerated hydrocodone in the past; pt reports she has; Dr. Sharpe is going to send in norco for short term pain management related to biopsy pain; pt verbalizes understanding

## 2024-12-21 LAB — Lab: NORMAL

## 2024-12-30 LAB
CYTO UR: NORMAL
LAB AP CASE REPORT: NORMAL
LAB AP CLINICAL INFORMATION: NORMAL
LAB AP DIAGNOSIS COMMENT: NORMAL
LAB AP SPECIAL STAINS: NORMAL
PATH REPORT.FINAL DX SPEC: NORMAL
PATH REPORT.GROSS SPEC: NORMAL

## 2025-01-13 ENCOUNTER — TELEPHONE (OUTPATIENT)
Dept: UROLOGY | Age: 70
End: 2025-01-13
Payer: MEDICARE

## 2025-01-13 NOTE — TELEPHONE ENCOUNTER
Spoke to patient and scheduled procedure for 2/27/25. I went over preop instructions and informed that I would be mailing the information. Patient voiced understanding.

## 2025-01-15 ENCOUNTER — LAB (OUTPATIENT)
Dept: LAB | Facility: HOSPITAL | Age: 70
End: 2025-01-15
Payer: MEDICARE

## 2025-01-15 DIAGNOSIS — C34.11 MALIGNANT NEOPLASM OF UPPER LOBE OF RIGHT LUNG: ICD-10-CM

## 2025-01-15 DIAGNOSIS — C56.9 MALIGNANT NEOPLASM OF OVARY, UNSPECIFIED LATERALITY: ICD-10-CM

## 2025-01-15 DIAGNOSIS — R94.8 ABNORMAL POSITRON EMISSION TOMOGRAPHY (PET) SCAN: ICD-10-CM

## 2025-01-15 DIAGNOSIS — Z12.31 ENCOUNTER FOR SCREENING MAMMOGRAM FOR MALIGNANT NEOPLASM OF BREAST: ICD-10-CM

## 2025-01-15 DIAGNOSIS — C68.9 UROTHELIAL CANCER: ICD-10-CM

## 2025-01-15 DIAGNOSIS — C73 PAPILLARY THYROID CARCINOMA: ICD-10-CM

## 2025-01-15 LAB
ALBUMIN SERPL-MCNC: 4.1 G/DL (ref 3.5–5.2)
ALBUMIN/GLOB SERPL: 1.5 G/DL
ALP SERPL-CCNC: 56 U/L (ref 39–117)
ALT SERPL W P-5'-P-CCNC: 12 U/L (ref 1–33)
ANION GAP SERPL CALCULATED.3IONS-SCNC: 10.6 MMOL/L (ref 5–15)
AST SERPL-CCNC: 13 U/L (ref 1–32)
BASOPHILS # BLD AUTO: 0.04 10*3/MM3 (ref 0–0.2)
BASOPHILS NFR BLD AUTO: 0.6 % (ref 0–1.5)
BILIRUB SERPL-MCNC: 0.4 MG/DL (ref 0–1.2)
BUN SERPL-MCNC: 13 MG/DL (ref 8–23)
BUN/CREAT SERPL: 14.9 (ref 7–25)
CALCIUM SPEC-SCNC: 8.9 MG/DL (ref 8.6–10.5)
CANCER AG125 SERPL QL: 9.5 U/ML (ref 0–38.1)
CHLORIDE SERPL-SCNC: 105 MMOL/L (ref 98–107)
CO2 SERPL-SCNC: 25.4 MMOL/L (ref 22–29)
CREAT SERPL-MCNC: 0.87 MG/DL (ref 0.57–1)
DEPRECATED RDW RBC AUTO: 47.4 FL (ref 37–54)
EGFRCR SERPLBLD CKD-EPI 2021: 72.7 ML/MIN/1.73
EOSINOPHIL # BLD AUTO: 0.16 10*3/MM3 (ref 0–0.4)
EOSINOPHIL NFR BLD AUTO: 2.6 % (ref 0.3–6.2)
ERYTHROCYTE [DISTWIDTH] IN BLOOD BY AUTOMATED COUNT: 13.7 % (ref 12.3–15.4)
GLOBULIN UR ELPH-MCNC: 2.8 GM/DL
GLUCOSE SERPL-MCNC: 93 MG/DL (ref 65–99)
HCT VFR BLD AUTO: 42.4 % (ref 34–46.6)
HGB BLD-MCNC: 13.2 G/DL (ref 12–15.9)
IMM GRANULOCYTES # BLD AUTO: 0.01 10*3/MM3 (ref 0–0.05)
IMM GRANULOCYTES NFR BLD AUTO: 0.2 % (ref 0–0.5)
LYMPHOCYTES # BLD AUTO: 2.25 10*3/MM3 (ref 0.7–3.1)
LYMPHOCYTES NFR BLD AUTO: 35.9 % (ref 19.6–45.3)
MCH RBC QN AUTO: 29 PG (ref 26.6–33)
MCHC RBC AUTO-ENTMCNC: 31.1 G/DL (ref 31.5–35.7)
MCV RBC AUTO: 93.2 FL (ref 79–97)
MONOCYTES # BLD AUTO: 0.47 10*3/MM3 (ref 0.1–0.9)
MONOCYTES NFR BLD AUTO: 7.5 % (ref 5–12)
NEUTROPHILS NFR BLD AUTO: 3.33 10*3/MM3 (ref 1.7–7)
NEUTROPHILS NFR BLD AUTO: 53.2 % (ref 42.7–76)
NRBC BLD AUTO-RTO: 0 /100 WBC (ref 0–0.2)
PLATELET # BLD AUTO: 346 10*3/MM3 (ref 140–450)
PMV BLD AUTO: 11.2 FL (ref 6–12)
POTASSIUM SERPL-SCNC: 4.4 MMOL/L (ref 3.5–5.2)
PROT SERPL-MCNC: 6.9 G/DL (ref 6–8.5)
RBC # BLD AUTO: 4.55 10*6/MM3 (ref 3.77–5.28)
SODIUM SERPL-SCNC: 141 MMOL/L (ref 136–145)
T4 FREE SERPL-MCNC: 1.26 NG/DL (ref 0.92–1.68)
TSH SERPL DL<=0.05 MIU/L-ACNC: 3.45 UIU/ML (ref 0.27–4.2)
WBC NRBC COR # BLD AUTO: 6.26 10*3/MM3 (ref 3.4–10.8)

## 2025-01-15 PROCEDURE — 84439 ASSAY OF FREE THYROXINE: CPT

## 2025-01-15 PROCEDURE — 83521 IG LIGHT CHAINS FREE EACH: CPT

## 2025-01-15 PROCEDURE — 36415 COLL VENOUS BLD VENIPUNCTURE: CPT

## 2025-01-15 PROCEDURE — 82784 ASSAY IGA/IGD/IGG/IGM EACH: CPT

## 2025-01-15 PROCEDURE — 84165 PROTEIN E-PHORESIS SERUM: CPT

## 2025-01-15 PROCEDURE — 85025 COMPLETE CBC W/AUTO DIFF WBC: CPT

## 2025-01-15 PROCEDURE — 86304 IMMUNOASSAY TUMOR CA 125: CPT

## 2025-01-15 PROCEDURE — 84443 ASSAY THYROID STIM HORMONE: CPT

## 2025-01-15 PROCEDURE — 86334 IMMUNOFIX E-PHORESIS SERUM: CPT

## 2025-01-15 PROCEDURE — 80053 COMPREHEN METABOLIC PANEL: CPT

## 2025-01-16 ENCOUNTER — OFFICE VISIT (OUTPATIENT)
Dept: ONCOLOGY | Facility: HOSPITAL | Age: 70
End: 2025-01-16
Payer: MEDICARE

## 2025-01-16 VITALS
HEART RATE: 77 BPM | WEIGHT: 140.4 LBS | DIASTOLIC BLOOD PRESSURE: 98 MMHG | HEIGHT: 62 IN | SYSTOLIC BLOOD PRESSURE: 189 MMHG | BODY MASS INDEX: 25.83 KG/M2 | TEMPERATURE: 97.3 F | OXYGEN SATURATION: 97 % | RESPIRATION RATE: 18 BRPM

## 2025-01-16 DIAGNOSIS — C34.11 MALIGNANT NEOPLASM OF UPPER LOBE OF RIGHT LUNG: Primary | ICD-10-CM

## 2025-01-16 DIAGNOSIS — C68.9 UROTHELIAL CANCER: ICD-10-CM

## 2025-01-16 DIAGNOSIS — C73 PAPILLARY THYROID CARCINOMA: ICD-10-CM

## 2025-01-16 DIAGNOSIS — C56.9 MALIGNANT NEOPLASM OF OVARY, UNSPECIFIED LATERALITY: ICD-10-CM

## 2025-01-16 PROCEDURE — 99214 OFFICE O/P EST MOD 30 MIN: CPT | Performed by: INTERNAL MEDICINE

## 2025-01-16 PROCEDURE — 1126F AMNT PAIN NOTED NONE PRSNT: CPT | Performed by: INTERNAL MEDICINE

## 2025-01-16 PROCEDURE — G0463 HOSPITAL OUTPT CLINIC VISIT: HCPCS | Performed by: INTERNAL MEDICINE

## 2025-01-16 NOTE — PROGRESS NOTES
Chief Complaint/Care Team   UROTHELIAL CA    Renetta Talbert MD Cothern, Amanda M, MD    History of Present Illness     Diagnosis: H/o Urothelial Carcinoma of Renal Pelvis    H/o RUL Non-small cell Lung cancer    Papillary thyroid cancer, diagnosed 2023    Endometrioid adenocarcinoma, FIGO grade 1 diagnosed 6/12/2024 via [facility], pT1a pN0, FIGO stage IA    Left ovary granulosa cell tumor diagnosed 6/12/2024, pT1a pN0 FIGO stage IA      Current Treatment: Active Surveillance        Neda Núñez is a 69 y.o. female who presents to [facility] HEMATOLOGY [facility] ONCOLOGY for follow up regarding H/o Urothelial Carcinoma of Renal Pelvis, RUL Non-small cell Lung cancer and most recently Papillary thyroid cancer, diagnosed 2023.    Urothelial Carcinoma of the Renal Pelvis:  - s/p left nephroureterectomy in Sept 2020  - pathology showed papillary urothelial carcinoma, invasive, high grade, invading into the renal parenchyma, margins negative, no lymph nodes submitted, pT3Nx  -currently under surveillance     RUL Lung Cancer:  - 2014 at an OSH with NSCLC  - treated with surgery and chemotherapy  -currently under surveillance    Papillary thyroid cancer  -s/p total thyroidectomy in 2/2024 by Dr. Pino at [facility]  -[id number]    Endometrioid adenocarcinoma, FIGO grade 1 diagnosed 6/12/2024 via [facility], pT1a pN0, FIGO stage IA    Left ovary granulosa cell tumor diagnosed 6/12/2024, pT1a pN0 FIGO stage IA    Pt again without report of any fever, chills, night sweats, nausea, vomiting, diarrhea, lymphadenopathy, or unintentional weight loss.  She does report pain in her lower back. Pt here to discuss results of femur biopsy. Pt pending follow up with ortho/onc at [facility] Dr. Huggins.     Review of Systems   Constitutional: Negative.  Negative for appetite change, diaphoresis, fatigue, fever, unexpected weight gain and unexpected weight loss.   HENT: Negative.  Negative for hearing loss, mouth  "sores, sore throat, swollen glands, trouble swallowing and voice change.    Eyes: Negative.  Negative for blurred vision.   Respiratory: Negative.  Negative for cough, shortness of breath and wheezing.    Cardiovascular: Negative.  Negative for chest pain and palpitations.   Gastrointestinal: Negative.  Negative for abdominal pain, blood in stool, constipation, diarrhea, nausea and vomiting.   Endocrine: Positive for heat intolerance (hot flashes). Negative for cold intolerance.   Genitourinary: Negative.  Negative for difficulty urinating, dysuria, frequency, hematuria and urinary incontinence.   Musculoskeletal:  Positive for back pain. Negative for arthralgias and myalgias.   Skin:  Negative for rash and wound.        hairloss   Allergic/Immunologic: Negative.    Neurological: Negative.  Positive for light-headedness. Negative for dizziness, seizures, weakness and numbness.   Hematological: Negative.  Does not bruise/bleed easily.   Psychiatric/Behavioral: Negative.  Negative for depressed mood. The patient is not nervous/anxious.    All other systems reviewed and are negative.       Oncology/Hematology History Overview Note   Urothelial Carcinoma of the Renal Pelvis:  - s/p left nephroureterectomy in Sept 2020  - pathology showed papillary urothelial carcinoma, invasive, high grade, invading into the renal parenchyma, margins negative, no lymph nodes submitted, pT3Nx    RUL Lung Cancer:  - 2014 at an OSH with NSCLC  - treated with surgery and chemotherapy     Lung cancer   10/25/2021 Initial Diagnosis    Lung cancer (HCC)         Objective     Vitals:    01/16/25 1112   BP: (!) 189/98   Pulse: 77   Resp: 18   Temp: 97.3 °F (36.3 °C)   TempSrc: Temporal   SpO2: 97%   Weight: 63.7 kg (140 lb 6.4 oz)   Height: 157.5 cm (62\")   PainSc: 0-No pain               ECOG score: 0         PHQ-9 Total Score:         Physical Exam  Vitals reviewed. Exam conducted with a chaperone present.   Constitutional:       General: She " is not in acute distress.     Appearance: Normal appearance.   HENT:      Head: Normocephalic and atraumatic.   Eyes:      Extraocular Movements: Extraocular movements intact.      Conjunctiva/sclera: Conjunctivae normal.   Cardiovascular:      Comments: No nipple changes or palpable masses in either breast and no bilateral axillary LAD palpated on exam  Pulmonary:      Effort: Pulmonary effort is normal.   Musculoskeletal:      Cervical back: Normal range of motion and neck supple.   Skin:     General: Skin is warm and dry.      Findings: No bruising.   Neurological:      Mental Status: She is oriented to person, place, and time.           Past Medical History     Past Medical History:   Diagnosis Date    Allergies     Disease of thyroid gland     Ear problem     Emphysema lung     Endometrial cancer     Kidney stone     Lung cancer     Mass of vulva     Reflux esophagitis     Thyroid cancer     Ureteral mass     Urothelial cancer 2020    (Left)    Vocal cord dysfunction     Wax in ear      Current Outpatient Medications on File Prior to Visit   Medication Sig Dispense Refill    fluticasone (FLONASE) 50 MCG/ACT nasal spray Administer 1 spray into the nostril(s) as directed by provider Daily.      levothyroxine (SYNTHROID, LEVOTHROID) 75 MCG tablet Take 1 tablet by mouth Daily. MONDAY - SATURDAY . TO HOLD ON       montelukast (SINGULAIR) 10 MG tablet Take 1 tablet by mouth Every Night.      pantoprazole (PROTONIX) 20 MG EC tablet Take 1 tablet by mouth Daily. 30 tablet 3     No current facility-administered medications on file prior to visit.      Allergies   Allergen Reactions    Penicillins Unknown - High Severity, Shortness Of Breath and Swelling     Tongue swelling    Throat swelling    Sulfa Antibiotics Unknown - High Severity and Hives     Itching/Rash    Percocet [Oxycodone-Acetaminophen] Irritability     Past Surgical History:   Procedure Laterality Date     SECTION      COLONOSCOPY N/A  12/11/2024    Procedure: COLONOSCOPY WITH POLYPECTOMY;  Surgeon: Ya Rodriguez MD;  Location: [facility] ENDOSCOPY;  Service: Gastroenterology;  Laterality: N/A;  COLON POLYP    CYSTOSCOPY RETROGRADE PYELOGRAM Bilateral 01/12/2023    Procedure: BILATERAL CYSTOSCOPY RETROGRADE PYELOGRAM;  Surgeon: Kadie Vu MD;  Location: [facility] MAIN OR;  Service: Urology;  Laterality: Bilateral;    CYSTOSCOPY URETERAL BIOPSY      CYSTOSCOPY W/ URETERAL STENT PLACEMENT      CYSTOSCOPY, URETEROSCOPY, RETROGRADE PYELOGRAM, STENT INSERTION      ENDOSCOPY N/A 12/11/2024    Procedure: ESOPHAGOGASTRODUODENOSCOPY WITH BIOPSIES;  Surgeon: Ya Rodriguez MD;  Location: [facility] ENDOSCOPY;  Service: Gastroenterology;  Laterality: N/A;  ESOPHAGITIS, HIATAL HERNIA, DUODENAL NODULE    HYSTERECTOMY  06/12/2024    KIDNEY SURGERY      LEG SURGERY      ignacio and pins     LUNG SURGERY Right     upper lobe removed    NEPHRECTOMY Left     THYROIDECTOMY      TRANSURETHRAL RESECTION OF BLADDER TUMOR N/A 11/02/2021    Procedure: CYSTOSCOPY TRANSURETHRAL RESECTION OF BLADDER TUMOR; CYSTOSCOPY RETROGRADE PYELOGRAM right;  Surgeon: Kadie Vu MD;  Location: [facility] MAIN OR;  Service: Urology;  Laterality: N/A;    TRANSURETHRAL RESECTION OF BLADDER TUMOR N/A 08/01/2023    Procedure: CYSTOSCOPY TRANSURETHRAL RESECTION OF BLADDER TUMOR;  Surgeon: Kadie Vu MD;  Location: [facility] MAIN OR;  Service: Urology;  Laterality: N/A;    URETERECTOMY      at the same time Nephrectomy    URETEROSCOPY LASER LITHOTRIPSY WITH STENT INSERTION Right 7/11/2024    Procedure: CYSTOSCOPY, RIGHT URETEROSCOPY, STENT EXCAHNGE;  Surgeon: Kadie Vu MD;  Location: [facility] MAIN OR;  Service: Urology;  Laterality: Right;     Social History     Socioeconomic History    Marital status:     Number of children: 2   Tobacco Use    Smoking status: Former     Current packs/day: 2.00     Types: Cigarettes     Passive exposure: Past    Smokeless  tobacco: Never    Tobacco comments:     Age: [age], quit 12 years ago   Vaping Use    Vaping status: Never Used   Substance and Sexual Activity    Alcohol use: Not Currently     Comment: Light - Rarely as of 7/30/2020    Drug use: Never    Sexual activity: Defer     Family History   Problem Relation Age of Onset    Diabetes Brother         Unspecified Type    Diabetes Maternal Grandfather         Unspecified Type    Breast cancer Paternal Grandmother     Cancer Other         Unspecified    Migraines Other         Migraine Headaches    Lung cancer Other     Malig Hyperthermia Neg Hx        Results     Result Review   The following data was reviewed by: Catrachito Sharpe MD on 03/21/2024:  Lab Results   Component Value Date    HGB 13.2 01/15/2025    HCT 42.4 01/15/2025    MCV 93.2 01/15/2025     01/15/2025    WBC 6.26 01/15/2025    NEUTROABS 3.33 01/15/2025    LYMPHSABS 2.25 01/15/2025    MONOSABS 0.47 01/15/2025    EOSABS 0.16 01/15/2025    BASOSABS 0.04 01/15/2025     Lab Results   Component Value Date    GLUCOSE 93 01/15/2025    BUN 13 01/15/2025    CREATININE 0.87 01/15/2025     01/15/2025    K 4.4 01/15/2025     01/15/2025    CO2 25.4 01/15/2025    CALCIUM 8.9 01/15/2025    PROTEINTOT 6.9 01/15/2025    ALBUMIN 4.1 01/15/2025    BILITOT 0.4 01/15/2025    ALKPHOS 56 01/15/2025    AST 13 01/15/2025    ALT 12 01/15/2025     Lab Results   Component Value Date    MG 2.0 06/21/2024    FREET4 1.26 01/15/2025    TSH 3.450 01/15/2025           No radiology results for the last day  CT Needle Biopsy Bone Superficial    Result Date: 12/19/2024  Impression: Impression: Technically successful percutaneous core biopsy of a proximal right femoral shaft lesion without evidence of complication. Electronically Signed: Niranjan Andino MD  12/19/2024 1:24 PM EST  Workstation ID: [id number]     Assessment & Plan     Diagnoses and all orders for this visit:    1. Malignant neoplasm of upper lobe of right lung (Primary)  -      CBC & Differential; Future  -     Comprehensive Metabolic Panel; Future  -     Immunofixation (JAJA), Protein Electrophoresis (PE), and Quantitative Free Kappa and Lambda Light Chains (FLC), Serum; Future  -     NM PET/CT Skull Base to Mid Thigh; Future    2. Papillary thyroid carcinoma  -     CBC & Differential; Future  -     Comprehensive Metabolic Panel; Future  -     Immunofixation (JAJA), Protein Electrophoresis (PE), and Quantitative Free Kappa and Lambda Light Chains (FLC), Serum; Future  -     TSH+Free T4; Future  -     NM PET/CT Skull Base to Mid Thigh; Future    3. Urothelial cancer  -     CBC & Differential; Future  -     Comprehensive Metabolic Panel; Future  -     Immunofixation (JAJA), Protein Electrophoresis (PE), and Quantitative Free Kappa and Lambda Light Chains (FLC), Serum; Future  -     NM PET/CT Skull Base to Mid Thigh; Future    4. Malignant neoplasm of ovary, unspecified laterality  -     CBC & Differential; Future  -     Comprehensive Metabolic Panel; Future  -     Immunofixation (JAJA), Protein Electrophoresis (PE), and Quantitative Free Kappa and Lambda Light Chains (FLC), Serum; Future  -     ; Future  -     NM PET/CT Skull Base to Mid Thigh; Future                  Neda Núñez is a 69 y.o. female who presents to [facility]ATOLOGY [facility] ONCOL[facility]Y for follow up regarding H/o Urothelial Carcinoma of Renal Pelvis, RUL Non-small cell Lung cancer and Papillary thyroid cancer, diagnosed 2023.    All are currently under active surveillance.  -Pt on levothyroxine which was prescribed by Dr. Pino at [email or web address]  -attempting to get clinic records for history from that office to confirm history    Urothelial Carcinoma of the Renal Pelvis:  - s/p left nephroureterectomy in Sept 2020  - pathology showed papillary urothelial carcinoma, invasive, high grade, invading into the renal parenchyma, margins negative, no lymph nodes submitted, pT3Nx  -currently under surveillance with   [facility]    RUL Lung Cancer:  - 2014 at an OSH with NSCLC  - treated with surgery and chemotherapy  -currently under surveillance    Papillary thyroid cancer  -s/p total thyroidectomy in 2/2024 by Dr. Pino at [facility]  -[id number]    Endometrioid adenocarcinoma, FIGO grade 1 diagnosed 6/12/2024 via [facility], pT1a pN0, FIGO stage IA, Grade 1 is observation is preferred,       Left ovary granulosa cell tumor diagnosed 6/12/2024, pT1a pN0 FIGO stage IA  -reviewed NCCN guidelines that for stage I sex cord-stromal tumors  --per NCCN guidelines, recommend scans with symptoms, elevated biomarkers, but typically every 6 months for years 0-2    Recommend pt follow up with gyn/onc physician Dr. Nieto    -Mammogram from 8/27/2024 was BI-RADS 1 negative recommend repeating in 1 year, due in 8/2025    -Given patient's back pain, ordered PET/CT scan to assess for malignancy, which occurred on 10/18/2024 which showed focal area of metabolic activity right proximal femur, mild metabolic activity in distal thoracic esophagus possible inflammatory, esophagram or direct visualization was recommended, decrease in size of fluid collection since her surgery, otherwise no other evidence of disease recurrence, pt reports recent pelvic exam by gyn/onc was unremarkable, recommend she continue with pelvic exams with gyn/onc  -Thus, referred patient to GI for consideration for EGD,  mild metabolic activity in distal thoracic esophagus   -ordered MRI of right femur in 11/2024 which revealed findings most consistent with a marrow replacing lesion involving the proximal diaphysis of the right femur measuring 2.0 cm. This lesion corresponds to the focus of abnormal radiopharmaceutical accumulation on the prior PET/CT and is likely related to   a metastatic focus. Referred pt to have CT guided biopsy of the lesion by IR, given pt history of multiple cancers, fortunately femur biopsy was negative for cancer on 12/19/2024 discussed results in  detail with pt and dtr, pt still has several questions about this lesion and would like to follow up with Dr. Huggins at [facility] to discuss further. Since lesion was negative for cancer recommended continued observation.     -Plan for pt follow up in 6 months with PET CT scan and labs CBC, CMP, FLC, SPEP, Immunofixation.    Please note that portions of this note were completed with a voice recognition program.    Electronically signed by Catrachito Sharpe MD, 01/16/25, 12:43 PM EST.      Follow Up     I spent 30 minutes caring for Neda on this date of service. This time includes time spent by me in the following activities:preparing for the visit, reviewing tests, obtaining and/or reviewing a separately obtained history, performing a medically appropriate examination and/or evaluation , counseling and educating the patient/family/caregiver, ordering medications, tests, or procedures, referring and communicating with other health care professionals , documenting information in the medical record, independently interpreting results and communicating that information with the patient/family/caregiver, and care coordination.    This is an acute or chronic illness that poses a threat to life or bodily function. The above treatment plan involves a high risk of complications and/or mortality of patient management.    The patient was seen and examined. Work by the provider also included review and/or ordering of lab tests, review and/or ordering of radiology tests, review and/or ordering of medicine tests, discussion with other physicians or providers, independent review of data, obtaining old records, review/summation of old records, and/or other review.    I have reviewed the family history, social history, and past medical history for this patient. Previous information and data has been reviewed and updated as needed. I have reviewed and verified the chief complaint, history, and other documentation. The patient was  interviewed and examined in the clinic and the chart reviewed. The previous observations, recommendations, and conclusions were reviewed including those of other providers.     The plan was discussed with the patient and/or family. The patient was given time to ask questions and these questions were answered. At the conclusion of their visit they had no additional questions or concerns and all questions were answered to their satisfaction.    Patient was given instructions and counseling regarding her condition or for health maintenance advice. Please see specific information pulled into the AVS if appropriate.

## 2025-01-17 LAB
ALBUMIN SERPL ELPH-MCNC: 3.5 G/DL (ref 2.9–4.4)
ALBUMIN/GLOB SERPL: 1.2 {RATIO} (ref 0.7–1.7)
ALPHA1 GLOB SERPL ELPH-MCNC: 0.2 G/DL (ref 0–0.4)
ALPHA2 GLOB SERPL ELPH-MCNC: 0.8 G/DL (ref 0.4–1)
B-GLOBULIN SERPL ELPH-MCNC: 1 G/DL (ref 0.7–1.3)
GAMMA GLOB SERPL ELPH-MCNC: 1 G/DL (ref 0.4–1.8)
GLOBULIN SER-MCNC: 3.1 G/DL (ref 2.2–3.9)
IGA SERPL-MCNC: 95 MG/DL (ref 87–352)
IGG SERPL-MCNC: 913 MG/DL (ref 586–1602)
IGM SERPL-MCNC: 100 MG/DL (ref 26–217)
INTERPRETATION SERPL IEP-IMP: ABNORMAL
KAPPA LC FREE SER-MCNC: 25.2 MG/L (ref 3.3–19.4)
KAPPA LC FREE/LAMBDA FREE SER: 1.53 {RATIO} (ref 0.26–1.65)
LABORATORY COMMENT REPORT: ABNORMAL
LAMBDA LC FREE SERPL-MCNC: 16.5 MG/L (ref 5.7–26.3)
M PROTEIN SERPL ELPH-MCNC: ABNORMAL G/DL
PROT SERPL-MCNC: 6.6 G/DL (ref 6–8.5)

## 2025-02-19 ENCOUNTER — TELEPHONE (OUTPATIENT)
Dept: GASTROENTEROLOGY | Facility: CLINIC | Age: 70
End: 2025-02-19

## 2025-02-19 NOTE — TELEPHONE ENCOUNTER
Contacted patient about the message that was left unable to make it to the appointment today. Rescheduled patient for 02/24/2025 at 10:00 am.

## 2025-02-24 ENCOUNTER — OFFICE VISIT (OUTPATIENT)
Dept: GASTROENTEROLOGY | Facility: CLINIC | Age: 70
End: 2025-02-24
Payer: MEDICARE

## 2025-02-24 VITALS
HEIGHT: 62 IN | HEART RATE: 72 BPM | WEIGHT: 148.6 LBS | DIASTOLIC BLOOD PRESSURE: 83 MMHG | BODY MASS INDEX: 27.34 KG/M2 | SYSTOLIC BLOOD PRESSURE: 171 MMHG

## 2025-02-24 DIAGNOSIS — K21.00 GASTROESOPHAGEAL REFLUX DISEASE WITH ESOPHAGITIS WITHOUT HEMORRHAGE: Primary | ICD-10-CM

## 2025-02-24 DIAGNOSIS — K44.9 HIATAL HERNIA: ICD-10-CM

## 2025-02-24 DIAGNOSIS — K29.80 DUODENITIS: ICD-10-CM

## 2025-02-24 DIAGNOSIS — K57.90 DIVERTICULOSIS: ICD-10-CM

## 2025-02-24 PROCEDURE — 1160F RVW MEDS BY RX/DR IN RCRD: CPT | Performed by: NURSE PRACTITIONER

## 2025-02-24 PROCEDURE — 1159F MED LIST DOCD IN RCRD: CPT | Performed by: NURSE PRACTITIONER

## 2025-02-24 PROCEDURE — 99214 OFFICE O/P EST MOD 30 MIN: CPT | Performed by: NURSE PRACTITIONER

## 2025-02-24 RX ORDER — FAMOTIDINE 20 MG/1
20 TABLET, FILM COATED ORAL NIGHTLY
Qty: 30 TABLET | Refills: 3 | Status: SHIPPED | OUTPATIENT
Start: 2025-02-24

## 2025-02-24 NOTE — PATIENT INSTRUCTIONS
Stop protonix     Start pepcid 20 mg at bedtime or as needed up to 2 times a day for reflux symptoms     Call the office or MYCHART me with any issues

## 2025-02-24 NOTE — PROGRESS NOTES
Chief Complaint   Itching    History of Present Illness       Neda Núñez is a 69 y.o. female who presents to [facility] for follow-up for GERD. She was last seen in the office by me on 10/29/24.     She has hx 5 different cancers. (Endometrial, thyroid, kidney, ovarian and lung) . She had lung cancer (non-small cell lung cancer) initially 13 years ago. She had right lobe lumpectomy at that time. She also has her esophagus scraped at that time per patient report. 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  Also history of total hysterectomy.        She gets regular scans for surveillance.  She had a PET scan done on 10/18/2024 that showed mild metabolic activity in the distal thoracic esophagus could be inflammatory.  Consider esophagram or direct visualization.          GI FH---denies any     She underwent EGD and colonoscopy with Dr. Rodriguez on 12/11/2024.  EGD showed mild esophagitis.  Small hiatal hernia.  Normal stomach.  Normal second portion of the duodenum.  Submucosal nodule found in the duodenum.  Biopsied.  Findings suggestive of a lipoma.  Colonoscopy showed mild diverticulosis.  A 3 mm polyp in the transverse colon was removed.  Anal papillae were hypertrophied.  Repeat colonoscopy in 5 years.  Colon biopsies positive for tubular adenoma.  Peptic duodenitis noted.  ReFlux esophagitis.    Patient started on Protonix 20 mg daily. Has done well on it. Has been worried about having one kidney and taking new meds. Would like to come off of it if possible. Had a rash but isn't sure what caused it. Bowels moving well.          Results       Result Review :       CMP          11/26/2024    12:15 12/3/2024    14:31 1/15/2025    13:35   CMP   Glucose  87  93    BUN  11  13    Creatinine 1.00  0.99  0.87    EGFR 61.5  62.2  72.7    Sodium  141  141    Potassium  3.9  4.4    Chloride  104  105    Calcium  9.3  8.9    Total Protein  7.3  6.6     6.9    Albumin  " 4.4  3.5     4.1    Globulin  2.9  3.1     2.8    Total Bilirubin  0.5  0.4    Alkaline Phosphatase  57  56    AST (SGOT)  16  13    ALT (SGPT)  20  12    Albumin/Globulin Ratio  1.5  1.2     1.5    BUN/Creatinine Ratio  11.1  14.9    Anion Gap  9.1  10.6      CBC          10/18/2024    11:00 12/3/2024    14:31 1/15/2025    13:35   CBC   WBC 8.59  7.24  6.26    RBC 5.01  4.81  4.55    Hemoglobin 14.3  13.9  13.2    Hematocrit 45.0  43.5  42.4    MCV 89.8  90.4  93.2    MCH 28.5  28.9  29.0    MCHC 31.8  32.0  31.1    RDW 13.5  14.4  13.7    Platelets 337  347  346      CBC w/diff          10/18/2024    11:00 12/3/2024    14:31 1/15/2025    13:35   CBC w/Diff   WBC 8.59  7.24  6.26    RBC 5.01  4.81  4.55    Hemoglobin 14.3  13.9  13.2    Hematocrit 45.0  43.5  42.4    MCV 89.8  90.4  93.2    MCH 28.5  28.9  29.0    MCHC 31.8  32.0  31.1    RDW 13.5  14.4  13.7    Platelets 337  347  346    Neutrophil Rel % 63.0  61.1  53.2    Immature Granulocyte Rel % 0.1  0.1  0.2    Lymphocyte Rel % 25.3  29.3  35.9    Monocyte Rel % 7.5  6.9  7.5    Eosinophil Rel % 3.5  2.2  2.6    Basophil Rel % 0.6  0.4  0.6        TSH          12/3/2024    14:31 1/15/2025    13:35   TSH   TSH 5.630  3.450        Lipase No results found for: \"LIPASE\"  Amylase No results found for: \"AMYLASE\"  Iron Profile No results found for: \"IRON\", \"TIBC\", \"LABIRON\", \"TRANSFERRIN\"  Ferritin No results found for: \"FERRITIN\"  ESR (Sed Rate)   Sed Rate   Date Value Ref Range Status   2020 6 0 - 30 mm/h Final               Past Medical History       Past Medical History:   Diagnosis Date    Allergies     Disease of thyroid gland     Ear problem     Emphysema lung     Endometrial cancer     Kidney stone     Lung cancer     Mass of vulva     Reflux esophagitis     Thyroid cancer     Ureteral mass     Urothelial cancer 2020    (Left)    Vocal cord dysfunction     Wax in ear        Past Surgical History:   Procedure Laterality Date     SECTION  "     COLONOSCOPY N/A 12/11/2024    Procedure: COLONOSCOPY WITH POLYPECTOMY;  Surgeon: Ya Rodriguez MD;  Location: [facility] ENDOSCOPY;  Service: Gastroenterology;  Laterality: N/A;  COLON POLYP    CYSTOSCOPY RETROGRADE PYELOGRAM Bilateral 01/12/2023    Procedure: BILATERAL CYSTOSCOPY RETROGRADE PYELOGRAM;  Surgeon: Kadie Vu MD;  Location: [facility] MAIN OR;  Service: Urology;  Laterality: Bilateral;    CYSTOSCOPY URETERAL BIOPSY      CYSTOSCOPY W/ URETERAL STENT PLACEMENT      CYSTOSCOPY, URETEROSCOPY, RETROGRADE PYELOGRAM, STENT INSERTION      ENDOSCOPY N/A 12/11/2024    Procedure: ESOPHAGOGASTRODUODENOSCOPY WITH BIOPSIES;  Surgeon: Ya Rodriguez MD;  Location: [facility] ENDOSCOPY;  Service: Gastroenterology;  Laterality: N/A;  ESOPHAGITIS, HIATAL HERNIA, DUODENAL NODULE    HYSTERECTOMY  06/12/2024    KIDNEY SURGERY      LEG SURGERY      ignacio and pins     LUNG SURGERY Right     upper lobe removed    NEPHRECTOMY Left     THYROIDECTOMY      TRANSURETHRAL RESECTION OF BLADDER TUMOR N/A 11/02/2021    Procedure: CYSTOSCOPY TRANSURETHRAL RESECTION OF BLADDER TUMOR; CYSTOSCOPY RETROGRADE PYELOGRAM right;  Surgeon: Kadie Vu MD;  Location: [facility] MAIN OR;  Service: Urology;  Laterality: N/A;    TRANSURETHRAL RESECTION OF BLADDER TUMOR N/A 08/01/2023    Procedure: CYSTOSCOPY TRANSURETHRAL RESECTION OF BLADDER TUMOR;  Surgeon: Kadie Vu MD;  Location: [facility] MAIN OR;  Service: Urology;  Laterality: N/A;    URETERECTOMY      at the same time Nephrectomy    URETEROSCOPY LASER LITHOTRIPSY WITH STENT INSERTION Right 7/11/2024    Procedure: CYSTOSCOPY, RIGHT URETEROSCOPY, STENT EXCAHNGE;  Surgeon: Kadie Vu MD;  Location: [facility] MAIN OR;  Service: Urology;  Laterality: Right;         Current Outpatient Medications:     fluticasone (FLONASE) 50 MCG/ACT nasal spray, Administer 1 spray into the nostril(s) as directed by provider Daily., Disp: , Rfl:     levothyroxine (SYNTHROID,  "LEVOTHROID) 75 MCG tablet, Take 1 tablet by mouth Daily. MONDAY - SATURDAY . TO HOLD ON SUNDAY, Disp: , Rfl:     montelukast (SINGULAIR) 10 MG tablet, Take 1 tablet by mouth Every Night., Disp: , Rfl:     pantoprazole (PROTONIX) 20 MG EC tablet, Take 1 tablet by mouth Daily., Disp: 30 tablet, Rfl: 3    famotidine (PEPCID) 20 MG tablet, Take 1 tablet by mouth Every Night., Disp: 30 tablet, Rfl: 3     Allergies   Allergen Reactions    Penicillins Unknown - High Severity, Shortness Of Breath and Swelling     Tongue swelling    Throat swelling    Sulfa Antibiotics Unknown - High Severity and Hives     Itching/Rash    Percocet [Oxycodone-Acetaminophen] Irritability       Family History   Problem Relation Age of Onset    Diabetes Brother         Unspecified Type    Diabetes Maternal Grandfather         Unspecified Type    Breast cancer Paternal Grandmother     Cancer Other         Unspecified    Migraines Other         Migraine Headaches    Lung cancer Other     Malig Hyperthermia Neg Hx         Social History     Social History Narrative    Not on file       Objective       Review of Systems   Constitutional:  Negative for appetite change, fatigue, fever, unexpected weight gain and unexpected weight loss.   HENT:  Negative for trouble swallowing.    Respiratory:  Negative for cough, choking, chest tightness, shortness of breath, wheezing and stridor.    Cardiovascular:  Negative for chest pain, palpitations and leg swelling.   Gastrointestinal:  Negative for abdominal distention, abdominal pain, anal bleeding, blood in stool, constipation, diarrhea, nausea, rectal pain, vomiting, GERD and indigestion.        Vital Signs:   /91 (BP Location: Left arm, Patient Position: Sitting, Cuff Size: Adult)   Pulse 80   Ht 157.5 cm (62\")   Wt 67.4 kg (148 lb 9.6 oz)   BMI 27.18 kg/m²       Physical Exam  Constitutional:       General: She is not in acute distress.     Appearance: She is well-developed. She is not " ill-appearing.   HENT:      Head: Normocephalic.   Eyes:      Pupils: Pupils are equal, round, and reactive to light.   Cardiovascular:      Rate and Rhythm: Normal rate and regular rhythm.      Heart sounds: Normal heart sounds.   Pulmonary:      Effort: Pulmonary effort is normal.      Breath sounds: Normal breath sounds.   Abdominal:      General: Bowel sounds are normal. There is no distension.      Palpations: Abdomen is soft. There is no mass.      Tenderness: There is no abdominal tenderness. There is no guarding or rebound.      Hernia: No hernia is present.   Musculoskeletal:         General: Normal range of motion.   Skin:     General: Skin is warm and dry.   Neurological:      Mental Status: She is alert and oriented to person, place, and time.   Psychiatric:         Speech: Speech normal.         Behavior: Behavior normal.         Judgment: Judgment normal.           Assessment & Plan          Assessment and Plan    Diagnoses and all orders for this visit:    1. Gastroesophageal reflux disease with esophagitis without hemorrhage (Primary)  -     famotidine (PEPCID) 20 MG tablet; Take 1 tablet by mouth Every Night.  Dispense: 30 tablet; Refill: 3    2. Hiatal hernia    3. Duodenitis    4. Diverticulosis    I discussed her case with her daughter on the phone today during the visit.  Reviewed EGD and colonoscopy results with her today.  GERD seems much better controlled now after finishing a round of Protonix.  Okay to stop the PPI.  Especially since she just has 1 kidney.  At this time we will start Pepcid 20 mg at bedtime.  Okay to take it up to twice a day as needed for reflux.  Continue GERD precautions.  Recommend high-fiber diet given diagnosis of diverticulosis.  Patient to avoid popcorn, peanuts and corn kernels.  Patient to call the office with any issues.  Patient to follow-up with me in 1 year.  Patient is agreeable to the plan.            Follow Up       Follow Up   Return in about 1 year  (around 2/24/2026) for GERD, diverticulosis.  Patient was given instructions and counseling regarding her condition or for health maintenance advice. Please see specific information pulled into the AVS if appropriate.

## 2025-02-25 RX ORDER — LEVOTHYROXINE SODIUM 75 UG/1
0.5 TABLET ORAL
COMMUNITY

## 2025-02-25 NOTE — PRE-PROCEDURE INSTRUCTIONS
IMPORTANT INSTRUCTIONS - PRE-ADMISSION TESTING  DO NOT EAT OR CHEW anything after midnight the night before your procedure.    You may have CLEAR liquids up to __2____ hours prior to ARRIVAL time.   Take the following medications the morning of your procedure with JUST A SIP OF WATER:  _________LEVOTHYROXINE, FAMOTIDINE, FLONASE ______________________________________________________________________________________________________________________________________________________________________________    DO NOT BRING your medications to the hospital with you, UNLESS something has changed since your PRE-Admission Testing appointment.  Hold all vitamins, supplements, and NSAIDS (Non- steroidal anti-inflammatory meds) for one week prior to surgery (you MAY take Tylenol or Acetaminophen).  If you are diabetic, check your blood sugar the morning of your procedure. If it is less than 70 or if you are feeling symptomatic, call the following number for further instructions: [phone]  Use your inhalers/nebulizers as usual, the morning of your procedure. BRING YOUR INHALERS with you.   Bring your CPAP or BIPAP to hospital, ONLY IF YOU WILL BE SPENDING THE NIGHT.   Make sure you have a ride home and have someone who will stay with you the day of your procedure after you go home.  If you have any questions, please call your Pre-Admission Testing Nurse, __PERRY___ at [phone]   Per anesthesia request, do not smoke for 24 hours before your procedure or as instructed by your surgeon.

## 2025-02-27 ENCOUNTER — HOSPITAL ENCOUNTER (OUTPATIENT)
Facility: HOSPITAL | Age: 70
Setting detail: HOSPITAL OUTPATIENT SURGERY
Discharge: HOME | End: 2025-02-27
Attending: UROLOGY | Admitting: UROLOGY
Payer: MEDICARE

## 2025-02-27 ENCOUNTER — ANESTHESIA (OUTPATIENT)
Dept: PERIOP | Facility: HOSPITAL | Age: 70
End: 2025-02-27
Payer: MEDICARE

## 2025-02-27 ENCOUNTER — ANESTHESIA EVENT (OUTPATIENT)
Dept: PERIOP | Facility: HOSPITAL | Age: 70
End: 2025-02-27
Payer: MEDICARE

## 2025-02-27 ENCOUNTER — APPOINTMENT (OUTPATIENT)
Dept: GENERAL RADIOLOGY | Facility: HOSPITAL | Age: 70
End: 2025-02-27
Payer: MEDICARE

## 2025-02-27 VITALS
SYSTOLIC BLOOD PRESSURE: 124 MMHG | RESPIRATION RATE: 16 BRPM | HEART RATE: 63 BPM | OXYGEN SATURATION: 95 % | DIASTOLIC BLOOD PRESSURE: 67 MMHG | HEIGHT: 62 IN | WEIGHT: 145.28 LBS | BODY MASS INDEX: 26.74 KG/M2 | TEMPERATURE: 98 F

## 2025-02-27 PROCEDURE — 25010000002 LIDOCAINE PF 2% 2 % SOLUTION: Performed by: NURSE ANESTHETIST, CERTIFIED REGISTERED

## 2025-02-27 PROCEDURE — 76000 FLUOROSCOPY <1 HR PHYS/QHP: CPT

## 2025-02-27 PROCEDURE — 52005 CYSTO W/URTRL CATHJ: CPT | Performed by: UROLOGY

## 2025-02-27 PROCEDURE — 25510000001 IOPAMIDOL PER 1 ML: Performed by: UROLOGY

## 2025-02-27 PROCEDURE — S0260 H&P FOR SURGERY: HCPCS | Performed by: UROLOGY

## 2025-02-27 PROCEDURE — 25010000002 PROPOFOL 10 MG/ML EMULSION: Performed by: NURSE ANESTHETIST, CERTIFIED REGISTERED

## 2025-02-27 PROCEDURE — 25810000003 LACTATED RINGERS PER 1000 ML: Performed by: ANESTHESIOLOGY

## 2025-02-27 PROCEDURE — C1758 CATHETER, URETERAL: HCPCS | Performed by: UROLOGY

## 2025-02-27 PROCEDURE — 25010000002 MIDAZOLAM PER 1MG: Performed by: ANESTHESIOLOGY

## 2025-02-27 RX ORDER — IBUPROFEN 600 MG/1
600 TABLET, FILM COATED ORAL EVERY 6 HOURS PRN
Status: DISCONTINUED | OUTPATIENT
Start: 2025-02-27 | End: 2025-02-27 | Stop reason: HOSPADM

## 2025-02-27 RX ORDER — IOPAMIDOL 510 MG/ML
INJECTION, SOLUTION INTRAVASCULAR AS NEEDED
Status: DISCONTINUED | OUTPATIENT
Start: 2025-02-27 | End: 2025-02-27 | Stop reason: HOSPADM

## 2025-02-27 RX ORDER — LIDOCAINE HYDROCHLORIDE 20 MG/ML
INJECTION, SOLUTION EPIDURAL; INFILTRATION; INTRACAUDAL; PERINEURAL AS NEEDED
Status: DISCONTINUED | OUTPATIENT
Start: 2025-02-27 | End: 2025-02-27 | Stop reason: SURG

## 2025-02-27 RX ORDER — DEXMEDETOMIDINE HYDROCHLORIDE 100 UG/ML
INJECTION, SOLUTION INTRAVENOUS AS NEEDED
Status: DISCONTINUED | OUTPATIENT
Start: 2025-02-27 | End: 2025-02-27 | Stop reason: SURG

## 2025-02-27 RX ORDER — PHENYLEPHRINE HCL IN 0.9% NACL 0.5 MG/5ML
SYRINGE (ML) INTRAVENOUS AS NEEDED
Status: DISCONTINUED | OUTPATIENT
Start: 2025-02-27 | End: 2025-02-27 | Stop reason: SURG

## 2025-02-27 RX ORDER — SODIUM CHLORIDE, SODIUM LACTATE, POTASSIUM CHLORIDE, CALCIUM CHLORIDE 600; 310; 30; 20 MG/100ML; MG/100ML; MG/100ML; MG/100ML
9 INJECTION, SOLUTION INTRAVENOUS CONTINUOUS PRN
Status: DISCONTINUED | OUTPATIENT
Start: 2025-02-27 | End: 2025-02-27 | Stop reason: HOSPADM

## 2025-02-27 RX ORDER — PROMETHAZINE HYDROCHLORIDE 12.5 MG/1
12.5 TABLET ORAL ONCE AS NEEDED
Status: DISCONTINUED | OUTPATIENT
Start: 2025-02-27 | End: 2025-02-27 | Stop reason: HOSPADM

## 2025-02-27 RX ORDER — PROPOFOL 10 MG/ML
VIAL (ML) INTRAVENOUS AS NEEDED
Status: DISCONTINUED | OUTPATIENT
Start: 2025-02-27 | End: 2025-02-27 | Stop reason: SURG

## 2025-02-27 RX ORDER — OXYCODONE HYDROCHLORIDE 5 MG/1
5 TABLET ORAL
Status: DISCONTINUED | OUTPATIENT
Start: 2025-02-27 | End: 2025-02-27 | Stop reason: HOSPADM

## 2025-02-27 RX ORDER — PROMETHAZINE HYDROCHLORIDE 25 MG/1
25 SUPPOSITORY RECTAL ONCE AS NEEDED
Status: DISCONTINUED | OUTPATIENT
Start: 2025-02-27 | End: 2025-02-27 | Stop reason: HOSPADM

## 2025-02-27 RX ORDER — MEPERIDINE HYDROCHLORIDE 25 MG/ML
12.5 INJECTION INTRAMUSCULAR; INTRAVENOUS; SUBCUTANEOUS
Status: DISCONTINUED | OUTPATIENT
Start: 2025-02-27 | End: 2025-02-27 | Stop reason: HOSPADM

## 2025-02-27 RX ORDER — MAGNESIUM HYDROXIDE 1200 MG/15ML
LIQUID ORAL AS NEEDED
Status: DISCONTINUED | OUTPATIENT
Start: 2025-02-27 | End: 2025-02-27 | Stop reason: HOSPADM

## 2025-02-27 RX ORDER — MIDAZOLAM HYDROCHLORIDE 2 MG/2ML
2 INJECTION, SOLUTION INTRAMUSCULAR; INTRAVENOUS ONCE
Status: COMPLETED | OUTPATIENT
Start: 2025-02-27 | End: 2025-02-27

## 2025-02-27 RX ORDER — DEXMEDETOMIDINE HYDROCHLORIDE 100 UG/ML
INJECTION, SOLUTION INTRAVENOUS AS NEEDED
Status: DISCONTINUED | OUTPATIENT
Start: 2025-02-27 | End: 2025-02-27

## 2025-02-27 RX ORDER — ONDANSETRON 2 MG/ML
4 INJECTION INTRAMUSCULAR; INTRAVENOUS ONCE AS NEEDED
Status: DISCONTINUED | OUTPATIENT
Start: 2025-02-27 | End: 2025-02-27 | Stop reason: HOSPADM

## 2025-02-27 RX ORDER — PROMETHAZINE HYDROCHLORIDE 25 MG/1
25 TABLET ORAL ONCE AS NEEDED
Status: DISCONTINUED | OUTPATIENT
Start: 2025-02-27 | End: 2025-02-27 | Stop reason: HOSPADM

## 2025-02-27 RX ADMIN — DEXMEDETOMIDINE 5 MCG: 100 INJECTION, SOLUTION, CONCENTRATE INTRAVENOUS at 10:20

## 2025-02-27 RX ADMIN — Medication 200 MCG: at 10:37

## 2025-02-27 RX ADMIN — PROPOFOL 250 MCG/KG/MIN: 10 INJECTION, EMULSION INTRAVENOUS at 10:14

## 2025-02-27 RX ADMIN — PROPOFOL 50 MG: 10 INJECTION, EMULSION INTRAVENOUS at 10:14

## 2025-02-27 RX ADMIN — SODIUM CHLORIDE, SODIUM LACTATE, POTASSIUM CHLORIDE, CALCIUM CHLORIDE 9 ML/HR: 20; 30; 600; 310 INJECTION, SOLUTION INTRAVENOUS at 08:45

## 2025-02-27 RX ADMIN — DEXMEDETOMIDINE 5 MCG: 100 INJECTION, SOLUTION, CONCENTRATE INTRAVENOUS at 10:25

## 2025-02-27 RX ADMIN — DEXMEDETOMIDINE 5 MCG: 100 INJECTION, SOLUTION, CONCENTRATE INTRAVENOUS at 10:33

## 2025-02-27 RX ADMIN — DEXMEDETOMIDINE 5 MCG: 100 INJECTION, SOLUTION, CONCENTRATE INTRAVENOUS at 10:14

## 2025-02-27 RX ADMIN — DEXMEDETOMIDINE 5 MCG: 100 INJECTION, SOLUTION, CONCENTRATE INTRAVENOUS at 10:29

## 2025-02-27 RX ADMIN — MIDAZOLAM HYDROCHLORIDE 2 MG: 1 INJECTION, SOLUTION INTRAMUSCULAR; INTRAVENOUS at 10:06

## 2025-02-27 RX ADMIN — Medication 100 MCG: at 10:31

## 2025-02-27 RX ADMIN — SODIUM CHLORIDE, SODIUM LACTATE, POTASSIUM CHLORIDE, CALCIUM CHLORIDE: 20; 30; 600; 310 INJECTION, SOLUTION INTRAVENOUS at 10:40

## 2025-02-27 RX ADMIN — LIDOCAINE HYDROCHLORIDE 100 MG: 20 INJECTION, SOLUTION INTRAVENOUS at 10:14

## 2025-02-27 NOTE — ANESTHESIA POSTPROCEDURE EVALUATION
Patient: Neda Núñez    Procedure Summary       Date: 02/27/25 Room / Location: [facility] OR 08 / [facility] MAIN OR    Anesthesia Start: 1011 Anesthesia Stop: 1047    Procedure: CYSTOSCOPY RETROGRADE Pyelogram right (Bilateral) Diagnosis:       Urothelial cancer      (Urothelial cancer [C68.9])    Surgeons: Kadie Vu MD Provider: Ryne Diallo MD    Anesthesia Type: general, MAC ASA Status: 3            Anesthesia Type: general, MAC    Vitals  Vitals Value Taken Time   /74 02/27/25 1145   Temp 36.6 °C (97.8 °F) 02/27/25 1135   Pulse 65 02/27/25 1145   Resp 12 02/27/25 1145   SpO2 97 % 02/27/25 1145           Post Anesthesia Care and Evaluation    Patient location during evaluation: bedside  Patient participation: complete - patient participated  Level of consciousness: awake    Airway patency: patent  PONV Status: none  Cardiovascular status: acceptable  Respiratory status: acceptable  Hydration status: acceptable

## 2025-02-27 NOTE — H&P
[facility]   Urology HISTORY AND PHYSICAL    Patient Name: Neda Núñez  : 1955  MRN: [id number]  Primary Care Physician:  Renetta Talbert MD  Date of admission: 2025    Subjective   Subjective       History of Present Illness  Patient has a history of renal pelvis cancer and presents for cystoscopy and retrograde pyelogram      Personal History     Past Medical History:   Diagnosis Date    Allergies     Disease of thyroid gland     Ear problem     Emphysema lung     Endometrial cancer     GERD (gastroesophageal reflux disease)     Kidney stone     Lung cancer     Mass of vulva     Ovarian ca     Reflux esophagitis     Thyroid cancer     Ureteral mass     Urothelial cancer 2020    (Left)    Vocal cord dysfunction     Wax in ear        Past Surgical History:   Procedure Laterality Date     SECTION      COLONOSCOPY N/A 2024    Procedure: COLONOSCOPY WITH POLYPECTOMY;  Surgeon: Ya Rodriguez MD;  Location: [facility] ENDOSCOPY;  Service: Gastroenterology;  Laterality: N/A;  COLON POLYP    CYSTOSCOPY RETROGRADE PYELOGRAM Bilateral 2023    Procedure: BILATERAL CYSTOSCOPY RETROGRADE PYELOGRAM;  Surgeon: Kadie Vu MD;  Location: [facility] MAIN OR;  Service: Urology;  Laterality: Bilateral;    CYSTOSCOPY URETERAL BIOPSY      CYSTOSCOPY W/ URETERAL STENT PLACEMENT      CYSTOSCOPY, URETEROSCOPY, RETROGRADE PYELOGRAM, STENT INSERTION      ENDOSCOPY N/A 2024    Procedure: ESOPHAGOGASTRODUODENOSCOPY WITH BIOPSIES;  Surgeon: Ya Rodriguez MD;  Location: [facility] ENDOSCOPY;  Service: Gastroenterology;  Laterality: N/A;  ESOPHAGITIS, HIATAL HERNIA, DUODENAL NODULE    HYSTERECTOMY  2024    KIDNEY SURGERY      LEG SURGERY      ignacio and pins     LUNG SURGERY Right     upper lobe removed    NEPHRECTOMY Left     THYROIDECTOMY      TRANSURETHRAL RESECTION OF BLADDER TUMOR N/A 2021    Procedure: CYSTOSCOPY TRANSURETHRAL RESECTION OF BLADDER TUMOR;  CYSTOSCOPY RETROGRADE PYELOGRAM right;  Surgeon: Kadie Vu MD;  Location: [facility] OR;  Service: Urology;  Laterality: N/A;    TRANSURETHRAL RESECTION OF BLADDER TUMOR N/A 08/01/2023    Procedure: CYSTOSCOPY TRANSURETHRAL RESECTION OF BLADDER TUMOR;  Surgeon: Kadie Vu MD;  [facility] MAIN OR;  Service: Urology;  Laterality: N/A;    URETERECTOMY      at the same time Nephrectomy    URETEROSCOPY LASER LITHOTRIPSY WITH STENT INSERTION Right 7/11/2024    Procedure: CYSTOSCOPY, RIGHT URETEROSCOPY, STENT EXCAHNGE;  Surgeon: Kadie Vu MD;  Location: [facility] OR;  Service: Urology;  Laterality: Right;       Family History: family history includes Breast cancer in her paternal grandmother; Cancer in an other family member; Diabetes in her brother and maternal grandfather; Lung cancer in an other family member; Migraines in an other family member. Otherwise pertinent FHx was reviewed and not pertinent to current issue.    Social History:  reports that she has quit smoking. Her smoking use included cigarettes. She has been exposed to tobacco smoke. She has never used smokeless tobacco. She reports that she does not currently use alcohol. She reports that she does not use drugs.    Home Medications:  famotidine, fluticasone, levothyroxine, and montelukast    Allergies:  Allergies   Allergen Reactions    Penicillins Unknown - High Severity, Shortness Of Breath and Swelling     Tongue swelling    Throat swelling    Sulfa Antibiotics Unknown - High Severity and Hives     Itching/Rash    Percocet [Oxycodone-Acetaminophen] Irritability    Protonix [Pantoprazole] Rash       Objective    Objective     Vitals:        Physical Exam  Constitutional:       Appearance: Normal appearance.   Cardiovascular:      Rate and Rhythm: Normal rate and regular rhythm.   Pulmonary:      Effort: Pulmonary effort is normal.      Breath sounds: Normal breath sounds.   Neurological:      Mental Status: She is  alert. Mental status is at baseline.   Psychiatric:         Mood and Affect: Mood and affect normal.         Speech: Speech normal.         Judgment: Judgment normal.         Result Review    Result Review:  I have personally reviewed the results from the time of this admission to 2/27/2025 08:21 EST and agree with these findings:  [x]  Laboratory  []  Microbiology  [x]  Radiology  []  EKG/Telemetry   []  Cardiology/Vascular   []  Pathology  [x]  Old records  []  Other:      Assessment & Plan   Assessment / Plan       Active Hospital Problems:  Active Hospital Problems    Diagnosis     **Urothelial cancer        Plan: cystoscopy and retrograde pyelogram  Risks and benefits discussed with patient and they are agreeable to proceed.    VTE Prophylaxis:  No VTE prophylaxis order currently exists.        CODE STATUS:

## 2025-02-27 NOTE — OP NOTE
CYSTOSCOPY RETROGRADE PYELOGRAM  Procedure Report    Patient Name:  Neda Núñez  YOB: 1955    Date of Surgery:  2/27/2025     Pre-op Diagnosis:   Urothelial cancer [C68.9]       Post-Op Diagnosis Codes:     * Urothelial cancer [C68.9]      Procedure/CPT® Codes:    Procedure(s):  CYSTOSCOPY RETROGRADE PYELOGRAM RIGHT       Staff:  Surgeon(s):  Kadie Vu MD         Anesthesia: General    Estimated Blood Loss: none    Implants:    Nothing was implanted during the procedure    Specimen:          None        Complications: None    Description of Procedure:     After proper consent was obtained, patient was taken to operating room and MAC anesthesia was performed.  The patient was placed in the dorsal lithotomy position and prepped and draped in the normal sterile fashion for cystoscopy.      A 22 Armenian rigid cystoscope was inserted into the bladder.  The bladder was inspected in a systemic meridian fashion using a 30  degree lens. There were no tumors, lesions, stones or other abnormalities seen.  Both ureteral orifices were normal in appearance.      A right retrograde pyelogram was then performed.  There were no filling defects, tumors, stones or other abnormalities seen.  The patient does not have a left kidney or ureter.  The bladder was emptied and the cystoscope removed.      The patient tolerated the procedure well and was transferred to the PACU in stable condition.            Kadie Vu MD     Date: 2/27/2025  Time: 10:37 EST

## 2025-02-27 NOTE — DISCHARGE INSTRUCTIONS
DISCHARGE INSTRUCTIONS CYSTOSCOPY      For your surgery you had:  Monitored anesthesia care  You may experience dizziness, drowsiness, or lightheadedness for several hours following surgery.  Do not stay alone today or tonight.  Limit your activity for 24 hours.  You should not drive, operate machinery, drink alcohol, or sign legally binding documents for 24 hours or while you are taking pain medication.  Resume your diet slowly.  Follow any special dietary instructions you may have been given by your doctor.    NOTIFY YOUR DOCTOR IF YOU EXPERIENCE ANY OF THE FOLLOWING:  Temperature greater than 101 degrees Fahrenheit  Shaking Chills  Redness or excessive drainage from incision  Nausea, vomiting and/or pain that is not controlled by prescribed medications  Increase in bleeding or bleeding that is excessive  Unable to urinate in 6 hours after surgery  If unable to reach your doctor, please go to the closest Emergency Room   Following your cystoscopy exam, you may experience burning upon urination.  You may also pass some bloody urine.  If the burning sensation and/or bloody urine should persist beyond 48 hours, call your doctor.  To encourage kidney and bladder function, you should drink as much fluid as possible.  If you have difficulty urinating, try sitting in a bath tub of warm water.  If you become uncomfortable because you cannot urinate, call your doctor or come to the Emergency Room at the hospital.      SPECIAL INSTRUCTIONS:  Follow any verbal instructions given by Dr. Vu.      Last dose of pain medication given at:   May take over the counter medication for pain at anytime if able and needed.

## 2025-02-27 NOTE — ANESTHESIA PREPROCEDURE EVALUATION
Anesthesia Evaluation     Patient summary reviewed and Nursing notes reviewed   NPO Solid Status: > 8 hours  NPO Liquid Status: > 4 hours           Airway   Mallampati: I  TM distance: >3 FB  Neck ROM: limited  No difficulty expected  Comment: Limited ROM to left lateral d/t arthritis /DDD  Dental    (+) partials, lower dentures and upper dentures    Pulmonary - normal exam    breath sounds clear to auscultation  (+) lung cancer (s/p RUL lobectomy), COPD,  Cardiovascular - normal exam    ECG reviewed  Rhythm: regular  Rate: normal    (+) hyperlipidemia      Neuro/Psych- negative ROS  GI/Hepatic/Renal/Endo    (+) GERD poorly controlled, renal disease- stones, thyroid problem thyroid nodules    Musculoskeletal (-) negative ROS    Abdominal    Substance History - negative use     OB/GYN negative ob/gyn ROS         Other      history of cancer (History of lung, endometrial, thyroid cancers) remission    ROS/Med Hx Other: GERD    History of vocal cord dysfunction- intubated 8/23 without issues,had LMA placed on 7/11/24 without issue    OTHERWISE NORMAL ECG -  Sinus rhythm  RSR' in V1 or V2, right VCD or RVH  When compared with ECG of 02-Jun-2023 14:00:41,  No significant change  Electronically Signed By: Jose Miller ([facility]) 23-Jan-2024 07:53:37  Date and Time of Study: 2024-01-17 15:01:47                      Anesthesia Plan    ASA 3     general and MAC     (Patient understands anesthesia not responsible for dental damage.     )  intravenous induction     Anesthetic plan, risks, benefits, and alternatives have been provided, discussed and informed consent has been obtained with: patient.  Pre-procedure education provided  Plan discussed with CRNA.        CODE STATUS:

## 2025-03-05 ENCOUNTER — OFFICE VISIT (OUTPATIENT)
Dept: UROLOGY | Age: 70
End: 2025-03-05
Payer: MEDICARE

## 2025-03-05 VITALS — HEIGHT: 62 IN | BODY MASS INDEX: 26.68 KG/M2 | WEIGHT: 145 LBS

## 2025-03-05 DIAGNOSIS — N32.81 OAB (OVERACTIVE BLADDER): ICD-10-CM

## 2025-03-05 DIAGNOSIS — C68.9 UROTHELIAL CANCER: Primary | ICD-10-CM

## 2025-03-05 LAB
BILIRUB BLD-MCNC: NEGATIVE MG/DL
CLARITY, POC: CLEAR
COLOR UR: YELLOW
EXPIRATION DATE: NORMAL
GLUCOSE UR STRIP-MCNC: NEGATIVE MG/DL
KETONES UR QL: NEGATIVE
LEUKOCYTE EST, POC: NEGATIVE
Lab: NORMAL
NITRITE UR-MCNC: NEGATIVE MG/ML
PH UR: 6 [PH] (ref 5–8)
PROT UR STRIP-MCNC: NEGATIVE MG/DL
RBC # UR STRIP: NEGATIVE /UL
SP GR UR: 1.01 (ref 1–1.03)
UROBILINOGEN UR QL: NORMAL

## 2025-03-05 RX ORDER — OXYBUTYNIN CHLORIDE 5 MG/1
5 TABLET, EXTENDED RELEASE ORAL DAILY
Qty: 30 TABLET | Refills: 11 | Status: SHIPPED | OUTPATIENT
Start: 2025-03-05

## 2025-03-05 NOTE — PROGRESS NOTES
"Chief Complaint  Urothelial cancer    Subjective            Neda Núñez presents to [facility] UROLOGY    History of Present Illness  The patient presents for evaluation of chronic kidney disease, rash, and urinary frequency.    She has a history of chronic kidney disease, which she attributes to her single kidney. She was previously prescribed medication by her gastroenterologist to address an erosion issue, but this treatment was discontinued after 2 months due to its adverse effects on her renal function. She is currently on Pepcid. She expresses concern about the potential need for dialysis in the future.    She reports experiencing persistent rashes, a symptom that began during her previous medication regimen and has continued despite the switch to Pepcid. The rashes are characterized by itchiness and disappear upon scratching. Her gastroenterologist suggested that an allergic reaction would have manifested immediately upon starting the initial medication. She questions whether these rashes could be a result of her renal condition.    She also reports frequent urination, a symptom that has been present throughout her life. She has not previously sought treatment for this issue.    Supplemental Information  She has whitecoat syndrome, and her blood pressure goes up when she thinks of awful things.    MEDICATIONS  Current: Pepcid       Objective   Vital Signs:   Ht 157.5 cm (62\")   Wt 65.8 kg (145 lb)   BMI 26.52 kg/m²       Physical Exam  Vitals and nursing note reviewed.   Constitutional:       Appearance: Normal appearance. She is well-developed.   Pulmonary:      Effort: Pulmonary effort is normal.      Breath sounds: Normal air entry.   Neurological:      Mental Status: She is alert and oriented to person, place, and time.      Motor: Motor function is intact.   Psychiatric:         Mood and Affect: Mood normal.         Behavior: Behavior normal.          Result Review :   The following " data was reviewed by: Kadie Vu MD on 03/05/2025:    Results for orders placed or performed in visit on 03/05/25   POC Urinalysis Dipstick, Automated    Collection Time: 03/05/25 10:19 AM    Specimen: Urine   Result Value Ref Range    Color Yellow Yellow, Straw, Dark Yellow, Kathrine    Clarity, UA Clear Clear    Specific Gravity  1.015 1.005 - 1.030    pH, Urine 6.0 5.0 - 8.0    Leukocytes Negative Negative    Nitrite, UA Negative Negative    Protein, POC Negative Negative mg/dL    Glucose, UA Negative Negative mg/dL    Ketones, UA Negative Negative    Urobilinogen, UA 0.2 E.U./dL Normal, 0.2 E.U./dL    Bilirubin Negative Negative    Blood, UA Negative Negative    Lot Number [id number]     Expiration Date 122,025          Results  Laboratory Studies  Creatinine is normal. GFR is 70 to 72.            Assessment and Plan    Diagnoses and all orders for this visit:    1. Urothelial cancer (Primary)  Overview:  S/p leftnephroureterectomy in Sept 2020    Orders:  -     POC Urinalysis Dipstick, Automated    2. OAB (overactive bladder)  -     oxybutynin XL (DITROPAN-XL) 5 MG 24 hr tablet; Take 1 tablet by mouth Daily.  Dispense: 30 tablet; Refill: 11      Assessment & Plan  1. Chronic kidney disease.  Her chronic kidney disease is attributed to her having only one kidney. Despite this, her creatinine and GFR levels are within normal limits, indicating satisfactory kidney function. She was reassured that her rashes are not related to her kidney condition. She was informed that the likelihood of requiring dialysis in the future is low. The importance of maintaining controlled blood sugar and blood pressure levels was emphasized.    2. Rash.  She reports experiencing hot rashes that are itchy and disappear upon scratching. These symptoms began while taking a previous medication for gastrointestinal issues. She was advised that the rashes are not related to her kidney condition.    3. Urinary frequency.  A prescription for  oxybutynin 5 mg was provided to manage her urinary frequency. She was informed that this medication will not adversely affect her kidneys. She is scheduled for a scope procedure in 6 months. If the medication proves ineffective, she is to inform us so that the dosage can be adjusted accordingly.    Follow-up  The patient will follow up in 6 months.          Follow Up       No follow-ups on file.  Patient was given instructions and counseling regarding her condition or for health maintenance advice. Please see specific information pulled into the AVS if appropriate.     Transcribed from ambient dictation for Kadie Vu MD by Kadie Vu MD.  03/05/25   10:17 EST    Patient or patient representative verbalized consent for the use of Ambient Listening during the visit with  Kadie Vu MD for chart documentation. 3/5/2025  10:18 EST

## 2025-07-16 ENCOUNTER — HOSPITAL ENCOUNTER (OUTPATIENT)
Dept: PET IMAGING | Facility: HOSPITAL | Age: 70
Discharge: HOME | End: 2025-07-16
Payer: MEDICARE

## 2025-07-16 DIAGNOSIS — C34.11 MALIGNANT NEOPLASM OF UPPER LOBE OF RIGHT LUNG: ICD-10-CM

## 2025-07-16 DIAGNOSIS — C56.9 MALIGNANT NEOPLASM OF OVARY, UNSPECIFIED LATERALITY: ICD-10-CM

## 2025-07-16 DIAGNOSIS — C68.9 UROTHELIAL CANCER: ICD-10-CM

## 2025-07-16 DIAGNOSIS — C73 PAPILLARY THYROID CARCINOMA: ICD-10-CM

## 2025-07-16 PROCEDURE — 34310000005 FLUDEOXYGLUCOSE F18 SOLUTION: Performed by: INTERNAL MEDICINE

## 2025-07-16 PROCEDURE — A9552 F18 FDG: HCPCS | Performed by: INTERNAL MEDICINE

## 2025-07-16 PROCEDURE — 78815 PET IMAGE W/CT SKULL-THIGH: CPT

## 2025-07-16 RX ADMIN — FLUDEOXYGLUCOSE F 18 1 DOSE: 200 INJECTION, SOLUTION INTRAVENOUS at 10:15

## 2025-07-21 ENCOUNTER — TELEPHONE (OUTPATIENT)
Dept: ONCOLOGY | Facility: HOSPITAL | Age: 70
End: 2025-07-21
Payer: MEDICARE

## 2025-07-21 DIAGNOSIS — K21.00 GASTROESOPHAGEAL REFLUX DISEASE WITH ESOPHAGITIS WITHOUT HEMORRHAGE: ICD-10-CM

## 2025-07-21 RX ORDER — FAMOTIDINE 20 MG/1
20 TABLET, FILM COATED ORAL NIGHTLY
Qty: 30 TABLET | Refills: 3 | Status: SHIPPED | OUTPATIENT
Start: 2025-07-21

## 2025-07-21 NOTE — TELEPHONE ENCOUNTER
I called the patient and left a voice message. The patient has an appointment with  on 7/23/25 but also has some lab work that will need to be completed before their appointment. Orders are in the chart.     Please inform the patient at call back.

## 2025-07-21 NOTE — TELEPHONE ENCOUNTER
Medication Requested famotidine    Last Refill 2/24/25    Last OV 2/24/25    Next OV 2/24/26    Medication pended for approval and correct pharmacy verified yes

## 2025-07-22 ENCOUNTER — LAB (OUTPATIENT)
Dept: LAB | Facility: HOSPITAL | Age: 70
End: 2025-07-22
Payer: MEDICARE

## 2025-07-22 DIAGNOSIS — C68.9 UROTHELIAL CANCER: ICD-10-CM

## 2025-07-22 DIAGNOSIS — C34.11 MALIGNANT NEOPLASM OF UPPER LOBE OF RIGHT LUNG: ICD-10-CM

## 2025-07-22 DIAGNOSIS — C73 PAPILLARY THYROID CARCINOMA: ICD-10-CM

## 2025-07-22 DIAGNOSIS — C56.9 MALIGNANT NEOPLASM OF OVARY, UNSPECIFIED LATERALITY: ICD-10-CM

## 2025-07-22 LAB
ALBUMIN SERPL-MCNC: 4.4 G/DL (ref 3.5–5.2)
ALBUMIN/GLOB SERPL: 1.5 G/DL
ALP SERPL-CCNC: 62 U/L (ref 39–117)
ALT SERPL W P-5'-P-CCNC: 15 U/L (ref 1–33)
ANION GAP SERPL CALCULATED.3IONS-SCNC: 12.7 MMOL/L (ref 5–15)
AST SERPL-CCNC: 14 U/L (ref 1–32)
BASOPHILS # BLD AUTO: 0.04 10*3/MM3 (ref 0–0.2)
BASOPHILS NFR BLD AUTO: 0.6 % (ref 0–1.5)
BILIRUB SERPL-MCNC: 0.3 MG/DL (ref 0–1.2)
BUN SERPL-MCNC: 12.3 MG/DL (ref 8–23)
BUN/CREAT SERPL: 12.4 (ref 7–25)
CALCIUM SPEC-SCNC: 9.6 MG/DL (ref 8.6–10.5)
CHLORIDE SERPL-SCNC: 102 MMOL/L (ref 98–107)
CO2 SERPL-SCNC: 25.3 MMOL/L (ref 22–29)
CREAT SERPL-MCNC: 0.99 MG/DL (ref 0.57–1)
DEPRECATED RDW RBC AUTO: 44.6 FL (ref 37–54)
EGFRCR SERPLBLD CKD-EPI 2021: 61.9 ML/MIN/1.73
EOSINOPHIL # BLD AUTO: 0.23 10*3/MM3 (ref 0–0.4)
EOSINOPHIL NFR BLD AUTO: 3.7 % (ref 0.3–6.2)
ERYTHROCYTE [DISTWIDTH] IN BLOOD BY AUTOMATED COUNT: 13 % (ref 12.3–15.4)
GLOBULIN UR ELPH-MCNC: 3 GM/DL
GLUCOSE SERPL-MCNC: 110 MG/DL (ref 65–99)
HCT VFR BLD AUTO: 45 % (ref 34–46.6)
HGB BLD-MCNC: 14.2 G/DL (ref 12–15.9)
IMM GRANULOCYTES # BLD AUTO: 0.02 10*3/MM3 (ref 0–0.05)
IMM GRANULOCYTES NFR BLD AUTO: 0.3 % (ref 0–0.5)
LYMPHOCYTES # BLD AUTO: 1.99 10*3/MM3 (ref 0.7–3.1)
LYMPHOCYTES NFR BLD AUTO: 31.6 % (ref 19.6–45.3)
MCH RBC QN AUTO: 29.7 PG (ref 26.6–33)
MCHC RBC AUTO-ENTMCNC: 31.6 G/DL (ref 31.5–35.7)
MCV RBC AUTO: 94.1 FL (ref 79–97)
MONOCYTES # BLD AUTO: 0.48 10*3/MM3 (ref 0.1–0.9)
MONOCYTES NFR BLD AUTO: 7.6 % (ref 5–12)
NEUTROPHILS NFR BLD AUTO: 3.54 10*3/MM3 (ref 1.7–7)
NEUTROPHILS NFR BLD AUTO: 56.2 % (ref 42.7–76)
NRBC BLD AUTO-RTO: 0 /100 WBC (ref 0–0.2)
PLATELET # BLD AUTO: 332 10*3/MM3 (ref 140–450)
PMV BLD AUTO: 11.1 FL (ref 6–12)
POTASSIUM SERPL-SCNC: 4.6 MMOL/L (ref 3.5–5.2)
PROT SERPL-MCNC: 7.4 G/DL (ref 6–8.5)
RBC # BLD AUTO: 4.78 10*6/MM3 (ref 3.77–5.28)
SODIUM SERPL-SCNC: 140 MMOL/L (ref 136–145)
T4 FREE SERPL-MCNC: 1.33 NG/DL (ref 0.92–1.68)
TSH SERPL DL<=0.05 MIU/L-ACNC: 5.8 UIU/ML (ref 0.27–4.2)
WBC NRBC COR # BLD AUTO: 6.3 10*3/MM3 (ref 3.4–10.8)

## 2025-07-22 PROCEDURE — 83521 IG LIGHT CHAINS FREE EACH: CPT

## 2025-07-22 PROCEDURE — 84439 ASSAY OF FREE THYROXINE: CPT

## 2025-07-22 PROCEDURE — 84443 ASSAY THYROID STIM HORMONE: CPT

## 2025-07-22 PROCEDURE — 36415 COLL VENOUS BLD VENIPUNCTURE: CPT

## 2025-07-22 PROCEDURE — 86334 IMMUNOFIX E-PHORESIS SERUM: CPT

## 2025-07-22 PROCEDURE — 82784 ASSAY IGA/IGD/IGG/IGM EACH: CPT

## 2025-07-22 PROCEDURE — 80053 COMPREHEN METABOLIC PANEL: CPT

## 2025-07-22 PROCEDURE — 86304 IMMUNOASSAY TUMOR CA 125: CPT

## 2025-07-22 PROCEDURE — 84165 PROTEIN E-PHORESIS SERUM: CPT

## 2025-07-22 PROCEDURE — 85025 COMPLETE CBC W/AUTO DIFF WBC: CPT

## 2025-07-23 ENCOUNTER — OFFICE VISIT (OUTPATIENT)
Dept: ONCOLOGY | Facility: HOSPITAL | Age: 70
End: 2025-07-23
Payer: MEDICARE

## 2025-07-23 VITALS
WEIGHT: 135.6 LBS | HEART RATE: 75 BPM | SYSTOLIC BLOOD PRESSURE: 188 MMHG | OXYGEN SATURATION: 99 % | BODY MASS INDEX: 24.95 KG/M2 | DIASTOLIC BLOOD PRESSURE: 95 MMHG | TEMPERATURE: 98.1 F | HEIGHT: 62 IN | RESPIRATION RATE: 18 BRPM

## 2025-07-23 DIAGNOSIS — C34.11 MALIGNANT NEOPLASM OF UPPER LOBE OF RIGHT LUNG: Primary | ICD-10-CM

## 2025-07-23 DIAGNOSIS — C68.9 UROTHELIAL CANCER: ICD-10-CM

## 2025-07-23 DIAGNOSIS — C56.9 MALIGNANT NEOPLASM OF OVARY, UNSPECIFIED LATERALITY: ICD-10-CM

## 2025-07-23 DIAGNOSIS — C73 PAPILLARY THYROID CARCINOMA: ICD-10-CM

## 2025-07-23 DIAGNOSIS — N20.0 KIDNEY STONE: ICD-10-CM

## 2025-07-23 LAB — CANCER AG125 SERPL QL: 11.6 U/ML (ref 0–38.1)

## 2025-07-23 PROCEDURE — G0463 HOSPITAL OUTPT CLINIC VISIT: HCPCS | Performed by: INTERNAL MEDICINE

## 2025-07-23 RX ORDER — HYDROCODONE BITARTRATE AND ACETAMINOPHEN 5; 325 MG/1; MG/1
1 TABLET ORAL EVERY 6 HOURS PRN
Qty: 120 TABLET | Refills: 0 | Status: SHIPPED | OUTPATIENT
Start: 2025-07-23

## 2025-07-23 NOTE — PROGRESS NOTES
Chief Complaint/Care Team   Pt here to follow up regarding bladder cancer, lung cancer and thyroid cancer    Renetta Talbert MD Cothern, Amanda M, MD    History of Present Illness     Diagnosis: H/o Urothelial Carcinoma of Renal Pelvis    H/o RUL Non-small cell Lung cancer    Papillary thyroid cancer, diagnosed 2023    Endometrioid adenocarcinoma, FIGO grade 1 diagnosed 6/12/2024 via [facility], pT1a pN0, FIGO stage IA    Left ovary granulosa cell tumor diagnosed 6/12/2024, pT1a pN0 FIGO stage IA      Current Treatment: Active Surveillance      Hem/Onc History:  Neda Núñez is a 70 y.o. female who presents to [facility] HEMATOLOGY & ONCOLOGY for follow up regarding H/o Urothelial Carcinoma of Renal Pelvis, RUL Non-small cell Lung cancer and most recently Papillary thyroid cancer, diagnosed 2023.    Urothelial Carcinoma of the Renal Pelvis:  - s/p left nephroureterectomy in Sept 2020  - pathology showed papillary urothelial carcinoma, invasive, high grade, invading into the renal parenchyma, margins negative, no lymph nodes submitted, pT3Nx  -currently under surveillance     RUL Lung Cancer:  - 2014 at an OSH with NSCLC  - treated with surgery and chemotherapy  -currently under surveillance    Papillary thyroid cancer  -s/p total thyroidectomy in 2/2024 by Dr. Pino at [facility]  -[id number]    Endometrioid adenocarcinoma, FIGO grade 1 diagnosed 6/12/2024 via [facility], pT1a pN0, FIGO stage IA    Left ovary granulosa cell tumor diagnosed 6/12/2024, pT1a pN0 FIGO stage IA    No report of any fever, chills, night sweats, nausea, vomiting, diarrhea, lymphadenopathy, or unintentional weight loss.  Pt underwent restaging imaging and she is here to discuss these results. Pt with pain from kidney stone and is pending follow up with urology.     Review of Systems   Constitutional:  Positive for fatigue. Negative for appetite change, diaphoresis, fever, unexpected weight gain and  "unexpected weight loss.   HENT: Negative.  Negative for hearing loss, mouth sores, sore throat, swollen glands, trouble swallowing and voice change.    Eyes: Negative.  Negative for blurred vision.   Respiratory: Negative.  Negative for cough, shortness of breath and wheezing.    Cardiovascular: Negative.  Negative for chest pain and palpitations.   Gastrointestinal: Negative.  Negative for abdominal pain, blood in stool, constipation, diarrhea, nausea and vomiting.   Endocrine: Negative for cold intolerance and heat intolerance.   Genitourinary: Negative.  Negative for difficulty urinating, dysuria, frequency, hematuria and urinary incontinence.   Musculoskeletal:  Negative for arthralgias and myalgias.   Skin:  Negative for rash and wound.   Allergic/Immunologic: Negative.    Neurological:  Negative for dizziness, seizures, weakness and numbness.   Hematological: Negative.  Does not bruise/bleed easily.   Psychiatric/Behavioral: Negative.  Negative for depressed mood. The patient is not nervous/anxious.    All other systems reviewed and are negative.       Oncology/Hematology History Overview Note   Urothelial Carcinoma of the Renal Pelvis:  - s/p left nephroureterectomy in Sept 2020  - pathology showed papillary urothelial carcinoma, invasive, high grade, invading into the renal parenchyma, margins negative, no lymph nodes submitted, pT3Nx    RUL Lung Cancer:  - 2014 at an OSH with NSCLC  - treated with surgery and chemotherapy     Lung cancer   10/25/2021 Initial Diagnosis    Lung cancer (HCC)         Objective     Vitals:    07/23/25 1139   BP: (!) 188/95   Pulse: 75   Resp: 18   Temp: 98.1 °F (36.7 °C)   TempSrc: Oral   SpO2: 99%   Weight: 61.5 kg (135 lb 9.6 oz)   Height: 157.5 cm (62\")   PainSc: 6    PainLoc: Back                 ECOG score: 0         PHQ-9 Total Score:         Physical Exam  Vitals reviewed. Exam conducted with a chaperone present.   Constitutional:       General: She is not in acute " distress.     Appearance: Normal appearance.   HENT:      Head: Normocephalic and atraumatic.   Eyes:      Extraocular Movements: Extraocular movements intact.      Conjunctiva/sclera: Conjunctivae normal.   Pulmonary:      Effort: Pulmonary effort is normal.   Musculoskeletal:      Cervical back: Normal range of motion and neck supple.   Skin:     General: Skin is warm and dry.      Findings: No bruising.   Neurological:      Mental Status: She is oriented to person, place, and time.           Past Medical History     Past Medical History:   Diagnosis Date    Allergies     Disease of thyroid gland     Ear problem     Emphysema lung     Endometrial cancer     GERD (gastroesophageal reflux disease)     Kidney stone     Lung cancer     Mass of vulva     Ovarian ca     Reflux esophagitis     Thyroid cancer     Ureteral mass     Urothelial cancer 12/03/2020    (Left)    Vocal cord dysfunction     Wax in ear      Current Outpatient Medications on File Prior to Visit   Medication Sig Dispense Refill    famotidine (PEPCID) 20 MG tablet TAKE 1 TABLET BY MOUTH EVERY NIGHT 30 tablet 3    fluticasone (FLONASE) 50 MCG/ACT nasal spray Administer 1 spray into the nostril(s) as directed by provider Daily.      levothyroxine (SYNTHROID, LEVOTHROID) 75 MCG tablet Take 1 tablet by mouth Daily. MONDAY - SATURDAY /. 0.5 TAB ON SUNDAY ONLY      montelukast (SINGULAIR) 10 MG tablet Take 1 tablet by mouth Every Night.      levothyroxine (SYNTHROID, LEVOTHROID) 75 MCG tablet Take 0.5 tablets by mouth Every 7 (Seven) Days. TAKES ONLY ON SUNDAY (Patient not taking: Reported on 7/23/2025)      oxybutynin XL (DITROPAN-XL) 5 MG 24 hr tablet Take 1 tablet by mouth Daily. (Patient not taking: Reported on 7/23/2025) 30 tablet 11     No current facility-administered medications on file prior to visit.      Allergies   Allergen Reactions    Penicillins Unknown - High Severity, Shortness Of Breath and Swelling     Tongue swelling    Throat swelling     Sulfa Antibiotics Unknown - High Severity and Hives     Itching/Rash    Percocet [Oxycodone-Acetaminophen] Irritability    Protonix [Pantoprazole] Rash     Past Surgical History:   Procedure Laterality Date     SECTION      COLONOSCOPY N/A 2024    Procedure: COLONOSCOPY WITH POLYPECTOMY;  Surgeon: Ya Rodriguez MD;  Location: [facility] ENDOSCOPY;  Service: Gastroenterology;  Laterality: N/A;  COLON POLYP    CYSTOSCOPY RETROGRADE PYELOGRAM Bilateral 2023    Procedure: BILATERAL CYSTOSCOPY RETROGRADE PYELOGRAM;  Surgeon: Kadie Vu MD;  Location: [facility] MAIN OR;  Service: Urology;  Laterality: Bilateral;    CYSTOSCOPY RETROGRADE PYELOGRAM Bilateral 2025    Procedure: CYSTOSCOPY RETROGRADE Pyelogram right;  Surgeon: Kadie Vu MD;  Location: [facility] MAIN OR;  Service: Urology;  Laterality: Bilateral;    CYSTOSCOPY URETERAL BIOPSY      CYSTOSCOPY W/ URETERAL STENT PLACEMENT      CYSTOSCOPY, URETEROSCOPY, RETROGRADE PYELOGRAM, STENT INSERTION      ENDOSCOPY N/A 2024    Procedure: ESOPHAGOGASTRODUODENOSCOPY WITH BIOPSIES;  Surgeon: Ya Rodriguez MD;  Location: [facility] ENDOSCOPY;  Service: Gastroenterology;  Laterality: N/A;  ESOPHAGITIS, HIATAL HERNIA, DUODENAL NODULE    HYSTERECTOMY  2024    KIDNEY SURGERY      LEG SURGERY      ignacio and pins     LUNG SURGERY Right     upper lobe removed    NEPHRECTOMY Left     THYROIDECTOMY      TRANSURETHRAL RESECTION OF BLADDER TUMOR N/A 2021    Procedure: CYSTOSCOPY TRANSURETHRAL RESECTION OF BLADDER TUMOR; CYSTOSCOPY RETROGRADE PYELOGRAM right;  Surgeon: Kadie Vu MD;  Location: [facility] MAIN OR;  Service: Urology;  Laterality: N/A;    TRANSURETHRAL RESECTION OF BLADDER TUMOR N/A 2023    Procedure: CYSTOSCOPY TRANSURETHRAL RESECTION OF BLADDER TUMOR;  Surgeon: Kadie Vu MD;  Location: [facility] MAIN OR;  Service: Urology;  Laterality: N/A;    URETERECTOMY      at the same time Nephrectomy     URETEROSCOPY LASER LITHOTRIPSY WITH STENT INSERTION Right 7/11/2024    Procedure: CYSTOSCOPY, RIGHT URETEROSCOPY, STENT EXCAHNGE;  Surgeon: Kadie Vu MD;  Location: [facility];  Service: Urology;  Laterality: Right;     Social History     Socioeconomic History    Marital status:     Number of children: 2   Tobacco Use    Smoking status: Former     Current packs/day: 2.00     Types: Cigarettes     Passive exposure: Past    Smokeless tobacco: Never    Tobacco comments:     Age: [age], quit 12 years ago   Vaping Use    Vaping status: Never Used   Substance and Sexual Activity    Alcohol use: Not Currently     Comment: Light - Rarely as of 7/30/2020    Drug use: Never    Sexual activity: Defer     Family History   Problem Relation Age of Onset    Diabetes Brother         Unspecified Type    Diabetes Maternal Grandfather         Unspecified Type    Breast cancer Paternal Grandmother     Cancer Other         Unspecified    Migraines Other         Migraine Headaches    Lung cancer Other     Malig Hyperthermia Neg Hx        Results     Result Review   The following data was reviewed by: Catrachito Sharpe MD   Lab Results   Component Value Date    HGB 14.2 07/22/2025    HCT 45.0 07/22/2025    MCV 94.1 07/22/2025     07/22/2025    WBC 6.30 07/22/2025    NEUTROABS 3.54 07/22/2025    LYMPHSABS 1.99 07/22/2025    MONOSABS 0.48 07/22/2025    EOSABS 0.23 07/22/2025    BASOSABS 0.04 07/22/2025     Lab Results   Component Value Date    GLUCOSE 110 (H) 07/22/2025    BUN 12.3 07/22/2025    CREATININE 0.99 07/22/2025     07/22/2025    K 4.6 07/22/2025     07/22/2025    CO2 25.3 07/22/2025    CALCIUM 9.6 07/22/2025    PROTEINTOT 7.4 07/22/2025    PROTEINTOT 6.6 07/22/2025    ALBUMIN 4.4 07/22/2025    ALBUMIN 3.5 07/22/2025    BILITOT 0.3 07/22/2025    ALKPHOS 62 07/22/2025    AST 14 07/22/2025    ALT 15 07/22/2025     Lab Results   Component Value Date    MG 2.0 06/21/2024    FREET4 1.33 07/22/2025     TSH 5.800 (H) 07/22/2025           No radiology results for the last day  NM PET/CT Skull Base to Mid Thigh  Result Date: 7/21/2025  Impression: Impression: 1.Left axillary lymph node appears slightly larger than on the prior PET with mild FDG uptake slightly below background. This is indeterminate but could be reactive. Correlate clinically and recommend follow-up. 2.No definite scintigraphic findings of new metastatic disease at this time. 3.FDG avid focus in the right proximal femur has slightly decreased activity compared to the prior PET. This was previously biopsied and reported negative. 4.Fluid attenuating collection along the left pelvic sidewall appears unchanged and does not appear to have significant FDG uptake. The collection in the lower pelvis seen on the prior PET is significantly decreased in the interval with only residual linear density suggesting scar.  Stable focus of FDG uptake in the left inferior vagina/perineum, favored to be benign. 5.Decreased activity in the esophagus with interval EGD reportedly demonstrating esophagitis. Electronically Signed: Eren Burton  7/21/2025 10:33 AM EDT  Workstation ID: [id number]      Assessment & Plan     Diagnoses and all orders for this visit:    1. Malignant neoplasm of upper lobe of right lung (Primary)  -     CBC & Differential; Future  -     Comprehensive Metabolic Panel; Future  -     TSH; Future  -     T4, Free; Future  -     ; Future  -     NM PET/CT Skull Base to Mid Thigh; Future    2. Papillary thyroid carcinoma  -     CBC & Differential; Future  -     Comprehensive Metabolic Panel; Future  -     TSH; Future  -     T4, Free; Future  -     ; Future  -     NM PET/CT Skull Base to Mid Thigh; Future    3. Malignant neoplasm of ovary, unspecified laterality  -     CBC & Differential; Future  -     Comprehensive Metabolic Panel; Future  -     TSH; Future  -     T4, Free; Future  -     ; Future  -     NM PET/CT Skull Base to Mid  Thigh; Future    4. Urothelial cancer  -     CBC & Differential; Future  -     Comprehensive Metabolic Panel; Future  -     TSH; Future  -     T4, Free; Future  -     ; Future  -     NM PET/CT Skull Base to Mid Thigh; Future    5. Kidney stone  -     HYDROcodone-acetaminophen (NORCO) 5-325 MG per tablet; Take 1 tablet by mouth Every 6 (Six) Hours As Needed for Moderate Pain.  Dispense: 120 tablet; Refill: 0                    Neda Núñez is a 70 y.o. female who presents to [facility] HEMATOLOGY & ONCOLOGY for follow up regarding H/o Urothelial Carcinoma of Renal Pelvis, RUL Non-small cell Lung cancer and Papillary thyroid cancer, diagnosed 2023.    All are currently under active surveillance.  -Pt on levothyroxine which was prescribed by Dr. Pino at [facility]  -attempting to get clinic records for history from that office to confirm history    Urothelial Carcinoma of the Renal Pelvis:  - s/p left nephroureterectomy in Sept 2020  - pathology showed papillary urothelial carcinoma, invasive, high grade, invading into the renal parenchyma, margins negative, no lymph nodes submitted, pT3Nx  -currently under surveillance with Dr. Horace SNYDER Lung Cancer:  - 2014 at an OSH with NSCLC  - treated with surgery and chemotherapy  -currently under surveillance    Papillary thyroid cancer  -s/p total thyroidectomy in 2/2024 by Dr. Pino at [facility]  -[id number]    Endometrioid adenocarcinoma, FIGO grade 1 diagnosed 6/12/2024 via [facility], pT1a pN0, FIGO stage IA, Grade 1 is observation is preferred,       Left ovary granulosa cell tumor diagnosed 6/12/2024, pT1a pN0 FIGO stage IA  -reviewed NCCN guidelines that for stage I sex cord-stromal tumors  --per NCCN guidelines, recommend scans with symptoms, elevated biomarkers, but typically every 6 months for years 0-2    Recommend pt follow up with gyn/onc physician Dr. Nieto    -Mammogram from 8/27/2024 was BI-RADS 1 negative recommend repeating in 1  year, due in 8/2025    -Given patient's back pain, ordered PET/CT scan to assess for malignancy, which occurred on 10/18/2024 which showed focal area of metabolic activity right proximal femur, mild metabolic activity in distal thoracic esophagus possible inflammatory, esophagram or direct visualization was recommended, decrease in size of fluid collection since her surgery, otherwise no other evidence of disease recurrence, pt reports recent pelvic exam by gyn/onc was unremarkable, recommend she continue with pelvic exams with gyn/onc  -Thus, referred patient to GI for consideration for EGD,  mild metabolic activity in distal thoracic esophagus   -ordered MRI of right femur in 11/2024 which revealed findings most consistent with a marrow replacing lesion involving the proximal diaphysis of the right femur measuring 2.0 cm. This lesion corresponds to the focus of abnormal radiopharmaceutical accumulation on the prior PET/CT and is likely related to   a metastatic focus. Referred pt to have CT guided biopsy of the lesion by IR, given pt history of multiple cancers, fortunately femur biopsy was negative for cancer on 12/19/2024 discussed results in detail with pt and dtr, pt still has several questions about this lesion and would like to follow up with Dr. Huggins at [facility] to discuss further. Since lesion was negative for cancer recommended continued observation.     7/23/2025: Discussed results of PET/CT scan from 7/21/2025 with no evidence of new metastatic disease, plan to rescan patient in 6 months.  Patient with pain from kidney stone thus prescribed short course of pain medication until she can follow-up with urology.  Discussed results most recent free light chain, SPEP, there was no M spike observed, only mild elevation in free light chain But otherwise CBC, CMP unremarkable, without crab criteria    -Plan for pt follow up in 6 months with PET CT scan and labs CBC, CMP, FLC, SPEP, Immunofixation.    Please note  that portions of this note were completed with a voice recognition program.    Electronically signed by Catrachito Sharpe MD, 07/25/25, 5:27 PM EDT.      Follow Up     I spent 30 minutes caring for Neda on this date of service. This time includes time spent by me in the following activities:preparing for the visit, reviewing tests, obtaining and/or reviewing a separately obtained history, performing a medically appropriate examination and/or evaluation , counseling and educating the patient/family/caregiver, ordering medications, tests, or procedures, referring and communicating with other health care professionals , documenting information in the medical record, independently interpreting results and communicating that information with the patient/family/caregiver, and care coordination.        The plan was discussed with the patient and/or family. The patient was given time to ask questions and these questions were answered. At the conclusion of their visit they had no additional questions or concerns and all questions were answered to their satisfaction.    Patient was given instructions and counseling regarding her condition or for health maintenance advice. Please see specific information pulled into the AVS if appropriate.

## 2025-07-25 LAB
ALBUMIN SERPL ELPH-MCNC: 3.5 G/DL (ref 2.9–4.4)
ALBUMIN/GLOB SERPL: 1.2 {RATIO} (ref 0.7–1.7)
ALPHA1 GLOB SERPL ELPH-MCNC: 0.2 G/DL (ref 0–0.4)
ALPHA2 GLOB SERPL ELPH-MCNC: 0.9 G/DL (ref 0.4–1)
B-GLOBULIN SERPL ELPH-MCNC: 1 G/DL (ref 0.7–1.3)
GAMMA GLOB SERPL ELPH-MCNC: 1 G/DL (ref 0.4–1.8)
GLOBULIN SER-MCNC: 3.1 G/DL (ref 2.2–3.9)
IGA SERPL-MCNC: 110 MG/DL (ref 87–352)
IGG SERPL-MCNC: 987 MG/DL (ref 586–1602)
IGM SERPL-MCNC: 107 MG/DL (ref 26–217)
INTERPRETATION SERPL IEP-IMP: ABNORMAL
KAPPA LC FREE SER-MCNC: 24.2 MG/L (ref 3.3–19.4)
KAPPA LC FREE/LAMBDA FREE SER: 1.52 {RATIO} (ref 0.26–1.65)
LABORATORY COMMENT REPORT: ABNORMAL
LAMBDA LC FREE SERPL-MCNC: 15.9 MG/L (ref 5.7–26.3)
M PROTEIN SERPL ELPH-MCNC: ABNORMAL G/DL
PROT SERPL-MCNC: 6.6 G/DL (ref 6–8.5)

## 2025-08-01 DIAGNOSIS — N20.0 KIDNEY STONE: Primary | ICD-10-CM

## (undated) DEVICE — Device

## (undated) DEVICE — CYSTO PACK: Brand: MEDLINE INDUSTRIES, INC.

## (undated) DEVICE — THE STERILE LIGHT HANDLE COVER IS USED WITH STERIS SURGICAL LIGHTING AND VISUALIZATION SYSTEMS.

## (undated) DEVICE — SOL IRR NACL 0.9PCT 3000ML

## (undated) DEVICE — MAT FLR ABS W/BLU/LINER 56X72IN WHT

## (undated) DEVICE — GOWN,REINFORCE,POLY,SIRUS,BREATH SLV,XLG: Brand: MEDLINE

## (undated) DEVICE — EVAC BLDR ELLIK 1P/U

## (undated) DEVICE — HF-RESECTION ELECTRODE PLASMALOOP LOOP, MEDIUM, 24 FR., 12°/16°, PK TURIS: Brand: OLYMPUS

## (undated) DEVICE — GLOVE,SURG,SENSICARE SLT,LF,PF,6.5: Brand: MEDLINE

## (undated) DEVICE — SKIN PREP TRAY W/CHG: Brand: MEDLINE INDUSTRIES, INC.

## (undated) DEVICE — TOWEL,OR,DSP,ST,BLUE,STD,4/PK,20PK/CS: Brand: MEDLINE

## (undated) DEVICE — MEDICINE CUP, GRADUATED, STER: Brand: MEDLINE

## (undated) DEVICE — TRAY,IRRIGATION,BULBSYRINGE,60ML,CSR,PVP: Brand: MEDLINE

## (undated) DEVICE — GW ZIPWIRE STD/SHFT STR TPR/3CM .035IN 150CM

## (undated) DEVICE — CYSTO/BLADDER IRRIGATION SET, REGULATING CLAMP

## (undated) DEVICE — CATH URETRL PA CONE TP 8F

## (undated) DEVICE — BASIC SINGLE BASIN-LF: Brand: MEDLINE INDUSTRIES, INC.

## (undated) DEVICE — SINGLE-USE BIOPSY FORCEPS: Brand: RADIAL JAW 4

## (undated) DEVICE — LINER SURG CANSTR SXN S/RIGD 1500CC

## (undated) DEVICE — GLV SURG SENSICARE SLT PF LF 6.5 STRL

## (undated) DEVICE — Y-TYPE TUR/BLADDER IRRIGATION SET, REGULATING CLAMP

## (undated) DEVICE — ENDOSCOPIC VALVE WITH ADAPTER.: Brand: SURSEAL® II

## (undated) DEVICE — CONN JET HYDRA H20 AUXILIARY DISP

## (undated) DEVICE — GW ZIPWIRE STD/SHFT STR TPR/3CM .038IN 150CM

## (undated) DEVICE — CATH 2L URETRL HC 6F 50CM

## (undated) DEVICE — TRY PREP SCRB VAG PVP

## (undated) DEVICE — Device: Brand: DEFENDO AIR/WATER/SUCTION AND BIOPSY VALVE

## (undated) DEVICE — CUP SPECI 4OZ LF STRL

## (undated) DEVICE — GW ZIPWIRE STD/SHFT ANG TPR/3CM .038X150CM

## (undated) DEVICE — TUBING, SUCTION, 1/4" X 10', STRAIGHT: Brand: MEDLINE

## (undated) DEVICE — SYS IRR PUMP SGL ACTN VAC SYR 10CC

## (undated) DEVICE — SOL IRRG H2O BG 3000ML STRL

## (undated) DEVICE — BLCK/BITE BLOX WO/DENTL/RIM W/STRAP 54F

## (undated) DEVICE — SOL IRRG H2O PL/BG 1000ML STRL

## (undated) DEVICE — SOLIDIFIER LIQLOC PLS 1500CC BT